# Patient Record
Sex: MALE | Race: WHITE | Employment: OTHER | URBAN - METROPOLITAN AREA
[De-identification: names, ages, dates, MRNs, and addresses within clinical notes are randomized per-mention and may not be internally consistent; named-entity substitution may affect disease eponyms.]

---

## 2017-02-10 ENCOUNTER — OFFICE VISIT (OUTPATIENT)
Dept: ORTHOPEDIC SURGERY | Age: 62
End: 2017-02-10

## 2017-02-10 VITALS
WEIGHT: 185 LBS | HEIGHT: 67 IN | HEART RATE: 95 BPM | DIASTOLIC BLOOD PRESSURE: 75 MMHG | BODY MASS INDEX: 29.03 KG/M2 | SYSTOLIC BLOOD PRESSURE: 136 MMHG

## 2017-02-10 DIAGNOSIS — M25.462 EFFUSION OF KNEE JOINT, LEFT: ICD-10-CM

## 2017-02-10 DIAGNOSIS — M21.162 ACQUIRED VARUS DEFORMITY KNEE, LEFT: Primary | ICD-10-CM

## 2017-02-10 DIAGNOSIS — M17.12 PRIMARY OSTEOARTHRITIS OF LEFT KNEE: ICD-10-CM

## 2017-02-10 PROCEDURE — 99213 OFFICE O/P EST LOW 20 MIN: CPT | Performed by: ORTHOPAEDIC SURGERY

## 2017-02-10 RX ORDER — DICLOFENAC SODIUM 75 MG/1
75 TABLET, DELAYED RELEASE ORAL 2 TIMES DAILY
Qty: 60 TABLET | Refills: 3 | Status: SHIPPED | OUTPATIENT
Start: 2017-02-10 | End: 2017-03-03 | Stop reason: HOSPADM

## 2017-02-16 ENCOUNTER — TELEPHONE (OUTPATIENT)
Dept: ORTHOPEDIC SURGERY | Age: 62
End: 2017-02-16

## 2017-02-16 ENCOUNTER — HOSPITAL ENCOUNTER (OUTPATIENT)
Dept: PHYSICAL THERAPY | Age: 62
Discharge: HOME | End: 2017-02-28
Admitting: ORTHOPAEDIC SURGERY

## 2017-02-20 ENCOUNTER — HOSPITAL ENCOUNTER (OUTPATIENT)
Dept: PHYSICAL THERAPY | Age: 62
Discharge: HOME | End: 2017-02-20
Admitting: ORTHOPAEDIC SURGERY

## 2017-02-27 ENCOUNTER — OFFICE VISIT (OUTPATIENT)
Dept: ORTHOPEDIC SURGERY | Age: 62
End: 2017-02-27

## 2017-02-27 VITALS
BODY MASS INDEX: 29.03 KG/M2 | SYSTOLIC BLOOD PRESSURE: 131 MMHG | DIASTOLIC BLOOD PRESSURE: 76 MMHG | WEIGHT: 185 LBS | HEIGHT: 67 IN | HEART RATE: 82 BPM

## 2017-02-27 DIAGNOSIS — M17.12 PRIMARY OSTEOARTHRITIS OF LEFT KNEE: ICD-10-CM

## 2017-02-27 DIAGNOSIS — M22.2X2 PATELLOFEMORAL STRESS SYNDROME OF LEFT KNEE: ICD-10-CM

## 2017-02-27 DIAGNOSIS — M21.162 ACQUIRED VARUS DEFORMITY KNEE, LEFT: ICD-10-CM

## 2017-02-27 DIAGNOSIS — M25.462 EFFUSION OF KNEE JOINT, LEFT: ICD-10-CM

## 2017-02-27 PROCEDURE — 99214 OFFICE O/P EST MOD 30 MIN: CPT | Performed by: ORTHOPAEDIC SURGERY

## 2017-03-01 ENCOUNTER — HOSPITAL ENCOUNTER (OUTPATIENT)
Dept: PREADMISSION TESTING | Age: 62
Discharge: HOME | End: 2017-03-01
Attending: ORTHOPAEDIC SURGERY | Admitting: ORTHOPAEDIC SURGERY

## 2017-03-01 VITALS — BODY MASS INDEX: 29.03 KG/M2 | WEIGHT: 185 LBS | HEIGHT: 67 IN

## 2017-03-03 ENCOUNTER — HOSPITAL ENCOUNTER (OUTPATIENT)
Dept: SURGERY | Age: 62
Discharge: HOME | End: 2017-03-03
Admitting: ORTHOPAEDIC SURGERY

## 2017-03-03 ENCOUNTER — TELEPHONE (OUTPATIENT)
Dept: ORTHOPEDIC SURGERY | Age: 62
End: 2017-03-03

## 2017-03-03 VITALS
TEMPERATURE: 97 F | SYSTOLIC BLOOD PRESSURE: 119 MMHG | RESPIRATION RATE: 16 BRPM | DIASTOLIC BLOOD PRESSURE: 74 MMHG | HEIGHT: 67 IN | BODY MASS INDEX: 29.03 KG/M2 | HEART RATE: 82 BPM | OXYGEN SATURATION: 96 % | WEIGHT: 185 LBS

## 2017-03-03 DIAGNOSIS — M17.12 PRIMARY OSTEOARTHRITIS OF LEFT KNEE: ICD-10-CM

## 2017-03-03 DIAGNOSIS — M21.162 ACQUIRED VARUS DEFORMITY KNEE, LEFT: ICD-10-CM

## 2017-03-03 DIAGNOSIS — M25.462 EFFUSION OF KNEE JOINT, LEFT: ICD-10-CM

## 2017-03-03 LAB
GLUCOSE BLD-MCNC: 96 MG/DL (ref 70–99)
PERFORMED ON: NORMAL

## 2017-03-03 PROCEDURE — 93010 ELECTROCARDIOGRAM REPORT: CPT | Performed by: INTERNAL MEDICINE

## 2017-03-03 RX ORDER — LABETALOL HYDROCHLORIDE 5 MG/ML
5 INJECTION, SOLUTION INTRAVENOUS EVERY 10 MIN PRN
Status: DISCONTINUED | OUTPATIENT
Start: 2017-03-03 | End: 2017-03-04 | Stop reason: HOSPADM

## 2017-03-03 RX ORDER — DIPHENHYDRAMINE HYDROCHLORIDE 50 MG/ML
12.5 INJECTION INTRAMUSCULAR; INTRAVENOUS
Status: ACTIVE | OUTPATIENT
Start: 2017-03-03 | End: 2017-03-03

## 2017-03-03 RX ORDER — MIDAZOLAM HYDROCHLORIDE 1 MG/ML
2 INJECTION INTRAMUSCULAR; INTRAVENOUS
Status: COMPLETED | OUTPATIENT
Start: 2017-03-03 | End: 2017-03-03

## 2017-03-03 RX ORDER — MEPERIDINE HYDROCHLORIDE 25 MG/ML
12.5 INJECTION INTRAMUSCULAR; INTRAVENOUS; SUBCUTANEOUS EVERY 5 MIN PRN
Status: DISCONTINUED | OUTPATIENT
Start: 2017-03-03 | End: 2017-03-04 | Stop reason: HOSPADM

## 2017-03-03 RX ORDER — SODIUM CHLORIDE, SODIUM LACTATE, POTASSIUM CHLORIDE, CALCIUM CHLORIDE 600; 310; 30; 20 MG/100ML; MG/100ML; MG/100ML; MG/100ML
INJECTION, SOLUTION INTRAVENOUS CONTINUOUS
Status: DISCONTINUED | OUTPATIENT
Start: 2017-03-03 | End: 2017-03-04 | Stop reason: HOSPADM

## 2017-03-03 RX ORDER — LORAZEPAM 2 MG/ML
0.5 INJECTION INTRAMUSCULAR EVERY 6 HOURS PRN
Status: DISCONTINUED | OUTPATIENT
Start: 2017-03-03 | End: 2017-03-04 | Stop reason: HOSPADM

## 2017-03-03 RX ORDER — HYDROCODONE BITARTRATE AND ACETAMINOPHEN 7.5; 325 MG/1; MG/1
2 TABLET ORAL EVERY 4 HOURS PRN
Status: DISCONTINUED | OUTPATIENT
Start: 2017-03-03 | End: 2017-03-04 | Stop reason: HOSPADM

## 2017-03-03 RX ORDER — HYDROCODONE BITARTRATE AND ACETAMINOPHEN 7.5; 325 MG/1; MG/1
1 TABLET ORAL EVERY 4 HOURS PRN
Status: DISCONTINUED | OUTPATIENT
Start: 2017-03-03 | End: 2017-03-04 | Stop reason: HOSPADM

## 2017-03-03 RX ORDER — HYDROCODONE BITARTRATE AND ACETAMINOPHEN 7.5; 325 MG/1; MG/1
1 TABLET ORAL EVERY 4 HOURS PRN
Status: DISCONTINUED | OUTPATIENT
Start: 2017-03-03 | End: 2017-03-03 | Stop reason: CLARIF

## 2017-03-03 RX ORDER — HYDRALAZINE HYDROCHLORIDE 20 MG/ML
5 INJECTION INTRAMUSCULAR; INTRAVENOUS EVERY 10 MIN PRN
Status: DISCONTINUED | OUTPATIENT
Start: 2017-03-03 | End: 2017-03-04 | Stop reason: HOSPADM

## 2017-03-03 RX ORDER — PROMETHAZINE HYDROCHLORIDE 25 MG/ML
6.25 INJECTION, SOLUTION INTRAMUSCULAR; INTRAVENOUS
Status: ACTIVE | OUTPATIENT
Start: 2017-03-03 | End: 2017-03-03

## 2017-03-03 RX ORDER — HYDROCODONE BITARTRATE AND ACETAMINOPHEN 7.5; 325 MG/1; MG/1
1 TABLET ORAL EVERY 4 HOURS PRN
Qty: 60 TABLET | Refills: 0 | Status: SHIPPED | OUTPATIENT
Start: 2017-03-03 | End: 2017-03-27

## 2017-03-03 RX ORDER — LORAZEPAM 0.5 MG/1
0.5 TABLET ORAL EVERY 6 HOURS PRN
Status: DISCONTINUED | OUTPATIENT
Start: 2017-03-03 | End: 2017-03-04 | Stop reason: HOSPADM

## 2017-03-03 RX ORDER — ONDANSETRON 2 MG/ML
4 INJECTION INTRAMUSCULAR; INTRAVENOUS
Status: ACTIVE | OUTPATIENT
Start: 2017-03-03 | End: 2017-03-03

## 2017-03-03 RX ORDER — FENTANYL CITRATE 50 UG/ML
25 INJECTION, SOLUTION INTRAMUSCULAR; INTRAVENOUS EVERY 5 MIN PRN
Status: DISCONTINUED | OUTPATIENT
Start: 2017-03-03 | End: 2017-03-04 | Stop reason: HOSPADM

## 2017-03-03 RX ADMIN — SODIUM CHLORIDE, SODIUM LACTATE, POTASSIUM CHLORIDE, CALCIUM CHLORIDE: 600; 310; 30; 20 INJECTION, SOLUTION INTRAVENOUS at 10:52

## 2017-03-03 RX ADMIN — MIDAZOLAM HYDROCHLORIDE 2 MG: 1 INJECTION INTRAMUSCULAR; INTRAVENOUS at 11:04

## 2017-03-03 RX ADMIN — FENTANYL CITRATE 25 MCG: 50 INJECTION, SOLUTION INTRAMUSCULAR; INTRAVENOUS at 14:10

## 2017-03-03 RX ADMIN — LORAZEPAM 0.5 MG: 2 INJECTION INTRAMUSCULAR at 13:57

## 2017-03-03 ASSESSMENT — PAIN SCALES - GENERAL
PAINLEVEL_OUTOF10: 4
PAINLEVEL_OUTOF10: 4
PAINLEVEL_OUTOF10: 2
PAINLEVEL_OUTOF10: 4

## 2017-03-03 ASSESSMENT — PAIN - FUNCTIONAL ASSESSMENT: PAIN_FUNCTIONAL_ASSESSMENT: 0-10

## 2017-03-03 ASSESSMENT — PAIN DESCRIPTION - PROGRESSION
CLINICAL_PROGRESSION: GRADUALLY WORSENING
CLINICAL_PROGRESSION_2: GRADUALLY IMPROVING

## 2017-03-03 ASSESSMENT — PAIN DESCRIPTION - DESCRIPTORS: DESCRIPTORS_2: PRESSURE

## 2017-03-03 ASSESSMENT — PAIN DESCRIPTION - INTENSITY: RATING_2: 4

## 2017-03-03 ASSESSMENT — PAIN DESCRIPTION - PAIN TYPE
TYPE: SURGICAL PAIN
TYPE_2: ACUTE PAIN

## 2017-03-03 ASSESSMENT — PAIN DESCRIPTION - LOCATION
LOCATION: KNEE
LOCATION_2: CHEST

## 2017-03-03 ASSESSMENT — PAIN DESCRIPTION - ORIENTATION: ORIENTATION: LEFT

## 2017-03-06 ENCOUNTER — OFFICE VISIT (OUTPATIENT)
Dept: ORTHOPEDIC SURGERY | Age: 62
End: 2017-03-06

## 2017-03-06 VITALS
HEART RATE: 97 BPM | WEIGHT: 185 LBS | HEIGHT: 67 IN | BODY MASS INDEX: 29.03 KG/M2 | SYSTOLIC BLOOD PRESSURE: 127 MMHG | DIASTOLIC BLOOD PRESSURE: 77 MMHG

## 2017-03-06 DIAGNOSIS — M17.12 PRIMARY OSTEOARTHRITIS OF LEFT KNEE: Primary | ICD-10-CM

## 2017-03-06 LAB
EKG ATRIAL RATE: 67 BPM
EKG DIAGNOSIS: NORMAL
EKG P AXIS: 44 DEGREES
EKG P-R INTERVAL: 150 MS
EKG Q-T INTERVAL: 388 MS
EKG QRS DURATION: 84 MS
EKG QTC CALCULATION (BAZETT): 409 MS
EKG R AXIS: 5 DEGREES
EKG T AXIS: 26 DEGREES
EKG VENTRICULAR RATE: 67 BPM

## 2017-03-06 PROCEDURE — 99024 POSTOP FOLLOW-UP VISIT: CPT | Performed by: ORTHOPAEDIC SURGERY

## 2017-03-08 ENCOUNTER — HOSPITAL ENCOUNTER (OUTPATIENT)
Dept: PHYSICAL THERAPY | Age: 62
Discharge: HOME | End: 2017-03-08
Admitting: ORTHOPAEDIC SURGERY

## 2017-03-10 ENCOUNTER — HOSPITAL ENCOUNTER (OUTPATIENT)
Dept: PHYSICAL THERAPY | Age: 62
Discharge: HOME | End: 2017-03-10
Admitting: ORTHOPAEDIC SURGERY

## 2017-03-14 ENCOUNTER — HOSPITAL ENCOUNTER (OUTPATIENT)
Dept: PHYSICAL THERAPY | Age: 62
Discharge: HOME | End: 2017-03-14
Admitting: ORTHOPAEDIC SURGERY

## 2017-03-17 ENCOUNTER — HOSPITAL ENCOUNTER (OUTPATIENT)
Dept: PHYSICAL THERAPY | Age: 62
Discharge: HOME | End: 2017-03-17
Admitting: ORTHOPAEDIC SURGERY

## 2017-03-27 ENCOUNTER — OFFICE VISIT (OUTPATIENT)
Dept: ORTHOPEDIC SURGERY | Age: 62
End: 2017-03-27

## 2017-03-27 VITALS
DIASTOLIC BLOOD PRESSURE: 75 MMHG | HEART RATE: 59 BPM | WEIGHT: 185 LBS | SYSTOLIC BLOOD PRESSURE: 134 MMHG | HEIGHT: 67 IN | BODY MASS INDEX: 29.03 KG/M2

## 2017-03-27 DIAGNOSIS — M25.462 EFFUSION OF KNEE JOINT, LEFT: ICD-10-CM

## 2017-03-27 DIAGNOSIS — M22.2X2 PATELLOFEMORAL STRESS SYNDROME OF LEFT KNEE: ICD-10-CM

## 2017-03-27 DIAGNOSIS — M17.12 PRIMARY OSTEOARTHRITIS OF LEFT KNEE: Primary | ICD-10-CM

## 2017-03-27 PROCEDURE — 73562 X-RAY EXAM OF KNEE 3: CPT | Performed by: ORTHOPAEDIC SURGERY

## 2017-03-27 PROCEDURE — 99024 POSTOP FOLLOW-UP VISIT: CPT | Performed by: ORTHOPAEDIC SURGERY

## 2017-03-27 RX ORDER — IBUPROFEN 800 MG/1
800 TABLET ORAL 3 TIMES DAILY PRN
Qty: 90 TABLET | Refills: 3 | Status: SHIPPED | OUTPATIENT
Start: 2017-03-27 | End: 2017-04-24

## 2017-04-24 ENCOUNTER — OFFICE VISIT (OUTPATIENT)
Dept: ORTHOPEDIC SURGERY | Age: 62
End: 2017-04-24

## 2017-04-24 VITALS
DIASTOLIC BLOOD PRESSURE: 78 MMHG | WEIGHT: 185 LBS | HEIGHT: 67 IN | SYSTOLIC BLOOD PRESSURE: 121 MMHG | BODY MASS INDEX: 29.03 KG/M2 | HEART RATE: 81 BPM

## 2017-04-24 DIAGNOSIS — M25.462 EFFUSION OF KNEE JOINT, LEFT: ICD-10-CM

## 2017-04-24 DIAGNOSIS — M17.12 PRIMARY OSTEOARTHRITIS OF LEFT KNEE: ICD-10-CM

## 2017-04-24 DIAGNOSIS — M22.2X2 PATELLOFEMORAL STRESS SYNDROME OF LEFT KNEE: Primary | ICD-10-CM

## 2017-04-24 PROCEDURE — 99024 POSTOP FOLLOW-UP VISIT: CPT | Performed by: ORTHOPAEDIC SURGERY

## 2017-04-24 RX ORDER — DICLOFENAC SODIUM 75 MG/1
75 TABLET, DELAYED RELEASE ORAL 2 TIMES DAILY
Qty: 60 TABLET | Refills: 3 | Status: SHIPPED | OUTPATIENT
Start: 2017-04-24 | End: 2018-01-26 | Stop reason: ALTCHOICE

## 2017-05-22 ENCOUNTER — OFFICE VISIT (OUTPATIENT)
Dept: ORTHOPEDIC SURGERY | Age: 62
End: 2017-05-22

## 2017-05-22 VITALS
BODY MASS INDEX: 29.03 KG/M2 | SYSTOLIC BLOOD PRESSURE: 126 MMHG | HEART RATE: 89 BPM | HEIGHT: 67 IN | DIASTOLIC BLOOD PRESSURE: 85 MMHG | WEIGHT: 185 LBS

## 2017-05-22 DIAGNOSIS — M21.162 ACQUIRED VARUS DEFORMITY KNEE, LEFT: ICD-10-CM

## 2017-05-22 DIAGNOSIS — M17.12 PRIMARY OSTEOARTHRITIS OF LEFT KNEE: Primary | ICD-10-CM

## 2017-05-22 DIAGNOSIS — M25.462 EFFUSION OF KNEE JOINT, LEFT: ICD-10-CM

## 2017-05-22 PROCEDURE — 99024 POSTOP FOLLOW-UP VISIT: CPT | Performed by: ORTHOPAEDIC SURGERY

## 2017-05-22 PROCEDURE — 73564 X-RAY EXAM KNEE 4 OR MORE: CPT | Performed by: ORTHOPAEDIC SURGERY

## 2017-05-26 ENCOUNTER — TELEPHONE (OUTPATIENT)
Dept: ORTHOPEDIC SURGERY | Age: 62
End: 2017-05-26

## 2017-05-31 RX ORDER — GABAPENTIN 300 MG/1
300 CAPSULE ORAL
Status: CANCELLED | OUTPATIENT
Start: 2017-05-31 | End: 2017-05-31

## 2017-05-31 RX ORDER — TRANEXAMIC ACID 650 1/1
1950 TABLET ORAL ONCE
Status: CANCELLED | OUTPATIENT
Start: 2017-05-31 | End: 2017-05-31

## 2017-06-01 ENCOUNTER — HOSPITAL ENCOUNTER (OUTPATIENT)
Dept: PREADMISSION TESTING | Age: 62
Discharge: HOME | End: 2017-06-01
Attending: ORTHOPAEDIC SURGERY | Admitting: ORTHOPAEDIC SURGERY

## 2017-06-01 VITALS
WEIGHT: 186 LBS | TEMPERATURE: 97.5 F | RESPIRATION RATE: 16 BRPM | DIASTOLIC BLOOD PRESSURE: 61 MMHG | HEART RATE: 59 BPM | BODY MASS INDEX: 29.19 KG/M2 | OXYGEN SATURATION: 96 % | SYSTOLIC BLOOD PRESSURE: 108 MMHG | HEIGHT: 67 IN

## 2017-06-01 LAB
ABO/RH: NORMAL
ANION GAP SERPL CALCULATED.3IONS-SCNC: 11 MMOL/L (ref 3–16)
ANTIBODY SCREEN: NORMAL
APTT: 29.4 SEC (ref 21–31.8)
BASOPHILS ABSOLUTE: 0.1 K/UL (ref 0–0.2)
BASOPHILS RELATIVE PERCENT: 0.8 %
BILIRUBIN URINE: NEGATIVE
BLOOD, URINE: ABNORMAL
BUN BLDV-MCNC: 14 MG/DL (ref 7–20)
CALCIUM SERPL-MCNC: 9 MG/DL (ref 8.3–10.6)
CHLORIDE BLD-SCNC: 105 MMOL/L (ref 99–110)
CLARITY: CLEAR
CO2: 27 MMOL/L (ref 21–32)
COLOR: YELLOW
CREAT SERPL-MCNC: 0.8 MG/DL (ref 0.8–1.3)
EOSINOPHILS ABSOLUTE: 0.2 K/UL (ref 0–0.6)
EOSINOPHILS RELATIVE PERCENT: 3.1 %
GFR AFRICAN AMERICAN: >60
GFR NON-AFRICAN AMERICAN: >60
GLUCOSE BLD-MCNC: 104 MG/DL (ref 70–99)
GLUCOSE URINE: NEGATIVE MG/DL
HCT VFR BLD CALC: 45.8 % (ref 40.5–52.5)
HEMOGLOBIN: 15.5 G/DL (ref 13.5–17.5)
INR BLD: 0.98 (ref 0.85–1.15)
KETONES, URINE: NEGATIVE MG/DL
LEUKOCYTE ESTERASE, URINE: NEGATIVE
LYMPHOCYTES ABSOLUTE: 2.9 K/UL (ref 1–5.1)
LYMPHOCYTES RELATIVE PERCENT: 37.5 %
MCH RBC QN AUTO: 32.3 PG (ref 26–34)
MCHC RBC AUTO-ENTMCNC: 33.8 G/DL (ref 31–36)
MCV RBC AUTO: 95.4 FL (ref 80–100)
MICROSCOPIC EXAMINATION: YES
MONOCYTES ABSOLUTE: 0.7 K/UL (ref 0–1.3)
MONOCYTES RELATIVE PERCENT: 9 %
NEUTROPHILS ABSOLUTE: 3.9 K/UL (ref 1.7–7.7)
NEUTROPHILS RELATIVE PERCENT: 49.6 %
NITRITE, URINE: NEGATIVE
PDW BLD-RTO: 13.1 % (ref 12.4–15.4)
PH UA: 6
PLATELET # BLD: 221 K/UL (ref 135–450)
PMV BLD AUTO: 8.9 FL (ref 5–10.5)
POTASSIUM SERPL-SCNC: 4.1 MMOL/L (ref 3.5–5.1)
PROTEIN UA: NEGATIVE MG/DL
PROTHROMBIN TIME: 11.1 SEC (ref 9.6–13)
RBC # BLD: 4.8 M/UL (ref 4.2–5.9)
RBC UA: NORMAL /HPF (ref 0–2)
SODIUM BLD-SCNC: 143 MMOL/L (ref 136–145)
SPECIFIC GRAVITY UA: 1.02
URINE TYPE: ABNORMAL
UROBILINOGEN, URINE: 0.2 E.U./DL
WBC # BLD: 7.8 K/UL (ref 4–11)
WBC UA: NORMAL /HPF (ref 0–5)

## 2017-06-02 LAB
MRSA SCREEN RT-PCR: NORMAL
URINE CULTURE, ROUTINE: NORMAL

## 2017-06-12 ENCOUNTER — OFFICE VISIT (OUTPATIENT)
Dept: ORTHOPEDIC SURGERY | Age: 62
End: 2017-06-12

## 2017-06-12 VITALS
HEIGHT: 67 IN | DIASTOLIC BLOOD PRESSURE: 70 MMHG | BODY MASS INDEX: 29.19 KG/M2 | WEIGHT: 186 LBS | SYSTOLIC BLOOD PRESSURE: 120 MMHG | HEART RATE: 103 BPM

## 2017-06-12 DIAGNOSIS — Z96.652 STATUS POST TOTAL LEFT KNEE REPLACEMENT: Primary | ICD-10-CM

## 2017-06-12 PROCEDURE — 99024 POSTOP FOLLOW-UP VISIT: CPT | Performed by: ORTHOPAEDIC SURGERY

## 2017-06-12 RX ORDER — OXYCODONE HYDROCHLORIDE AND ACETAMINOPHEN 5; 325 MG/1; MG/1
1-2 TABLET ORAL 4 TIMES DAILY PRN
Qty: 60 TABLET | Refills: 0 | Status: SHIPPED | OUTPATIENT
Start: 2017-06-12 | End: 2017-07-10

## 2017-06-16 ENCOUNTER — TELEPHONE (OUTPATIENT)
Dept: ORTHOPEDIC SURGERY | Age: 62
End: 2017-06-16

## 2017-06-16 RX ORDER — HYDROCODONE BITARTRATE AND ACETAMINOPHEN 7.5; 325 MG/1; MG/1
1 TABLET ORAL 4 TIMES DAILY PRN
Qty: 60 TABLET | Refills: 0 | Status: SHIPPED | OUTPATIENT
Start: 2017-06-16 | End: 2017-07-10

## 2017-06-19 ENCOUNTER — TELEPHONE (OUTPATIENT)
Dept: ORTHOPEDIC SURGERY | Age: 62
End: 2017-06-19

## 2017-07-03 ENCOUNTER — TELEPHONE (OUTPATIENT)
Dept: ORTHOPEDIC SURGERY | Age: 62
End: 2017-07-03

## 2017-07-03 RX ORDER — HYDROCODONE BITARTRATE AND ACETAMINOPHEN 7.5; 325 MG/1; MG/1
1 TABLET ORAL 2 TIMES DAILY PRN
Qty: 60 TABLET | Refills: 0 | Status: SHIPPED | OUTPATIENT
Start: 2017-07-03 | End: 2017-07-10

## 2017-07-10 ENCOUNTER — OFFICE VISIT (OUTPATIENT)
Dept: ORTHOPEDIC SURGERY | Age: 62
End: 2017-07-10

## 2017-07-10 VITALS
WEIGHT: 186 LBS | SYSTOLIC BLOOD PRESSURE: 121 MMHG | DIASTOLIC BLOOD PRESSURE: 77 MMHG | HEIGHT: 67 IN | BODY MASS INDEX: 29.19 KG/M2 | HEART RATE: 81 BPM

## 2017-07-10 DIAGNOSIS — Z96.652 STATUS POST TOTAL LEFT KNEE REPLACEMENT: Primary | ICD-10-CM

## 2017-07-10 PROCEDURE — 99024 POSTOP FOLLOW-UP VISIT: CPT | Performed by: ORTHOPAEDIC SURGERY

## 2017-07-10 RX ORDER — HYDROCODONE BITARTRATE AND ACETAMINOPHEN 7.5; 325 MG/1; MG/1
1 TABLET ORAL 3 TIMES DAILY PRN
Qty: 60 TABLET | Refills: 0 | Status: SHIPPED | OUTPATIENT
Start: 2017-07-10 | End: 2017-10-25

## 2017-07-10 RX ORDER — DICLOFENAC SODIUM 75 MG/1
75 TABLET, DELAYED RELEASE ORAL 2 TIMES DAILY
Qty: 60 TABLET | Refills: 3 | Status: SHIPPED | OUTPATIENT
Start: 2017-07-10 | End: 2017-10-25

## 2017-07-17 ENCOUNTER — TELEPHONE (OUTPATIENT)
Dept: ORTHOPEDIC SURGERY | Age: 62
End: 2017-07-17

## 2017-07-17 RX ORDER — AMOXICILLIN AND CLAVULANATE POTASSIUM 875; 125 MG/1; MG/1
1 TABLET, FILM COATED ORAL 2 TIMES DAILY
Qty: 20 TABLET | Refills: 0 | Status: SHIPPED | OUTPATIENT
Start: 2017-07-17 | End: 2017-07-27

## 2017-07-18 ENCOUNTER — HOSPITAL ENCOUNTER (OUTPATIENT)
Dept: PHYSICAL THERAPY | Age: 62
Discharge: HOME | End: 2017-07-31
Admitting: ORTHOPAEDIC SURGERY

## 2017-07-24 ENCOUNTER — HOSPITAL ENCOUNTER (OUTPATIENT)
Dept: PHYSICAL THERAPY | Age: 62
Discharge: HOME | End: 2017-07-24
Admitting: ORTHOPAEDIC SURGERY

## 2017-07-26 ENCOUNTER — TELEPHONE (OUTPATIENT)
Dept: ORTHOPEDIC SURGERY | Age: 62
End: 2017-07-26

## 2017-07-26 ENCOUNTER — HOSPITAL ENCOUNTER (OUTPATIENT)
Dept: PHYSICAL THERAPY | Age: 62
Discharge: HOME | End: 2017-07-26
Admitting: ORTHOPAEDIC SURGERY

## 2017-07-26 RX ORDER — METHOCARBAMOL 750 MG/1
750 TABLET, FILM COATED ORAL 3 TIMES DAILY PRN
Qty: 90 TABLET | Refills: 0 | Status: SHIPPED | OUTPATIENT
Start: 2017-07-26 | End: 2017-08-25

## 2017-08-09 ENCOUNTER — HOSPITAL ENCOUNTER (OUTPATIENT)
Dept: PHYSICAL THERAPY | Age: 62
Discharge: HOME | End: 2017-08-09
Admitting: ORTHOPAEDIC SURGERY

## 2017-08-11 ENCOUNTER — OFFICE VISIT (OUTPATIENT)
Dept: ORTHOPEDIC SURGERY | Age: 62
End: 2017-08-11

## 2017-08-11 ENCOUNTER — HOSPITAL ENCOUNTER (OUTPATIENT)
Dept: PHYSICAL THERAPY | Age: 62
Discharge: HOME | End: 2017-08-11
Admitting: ORTHOPAEDIC SURGERY

## 2017-08-11 VITALS
DIASTOLIC BLOOD PRESSURE: 85 MMHG | BODY MASS INDEX: 29.19 KG/M2 | HEIGHT: 67 IN | WEIGHT: 186 LBS | HEART RATE: 66 BPM | SYSTOLIC BLOOD PRESSURE: 142 MMHG

## 2017-08-11 DIAGNOSIS — M17.12 PRIMARY OSTEOARTHRITIS OF LEFT KNEE: Chronic | ICD-10-CM

## 2017-08-11 DIAGNOSIS — M25.462 EFFUSION OF KNEE JOINT, LEFT: Primary | ICD-10-CM

## 2017-08-11 DIAGNOSIS — M21.162 ACQUIRED VARUS DEFORMITY KNEE, LEFT: ICD-10-CM

## 2017-08-11 DIAGNOSIS — Z96.652 STATUS POST TOTAL LEFT KNEE REPLACEMENT: ICD-10-CM

## 2017-08-11 DIAGNOSIS — G90.523 REFLEX SYMPATHETIC DYSTROPHY OF BOTH LOWER EXTREMITIES: ICD-10-CM

## 2017-08-11 PROCEDURE — 99024 POSTOP FOLLOW-UP VISIT: CPT | Performed by: ORTHOPAEDIC SURGERY

## 2017-08-11 RX ORDER — GABAPENTIN 300 MG/1
300 CAPSULE ORAL 3 TIMES DAILY
Qty: 90 CAPSULE | Refills: 3 | Status: SHIPPED | OUTPATIENT
Start: 2017-08-11 | End: 2018-03-19 | Stop reason: CLARIF

## 2017-08-14 ENCOUNTER — HOSPITAL ENCOUNTER (OUTPATIENT)
Dept: PHYSICAL THERAPY | Age: 62
Discharge: HOME | End: 2017-08-14
Admitting: ORTHOPAEDIC SURGERY

## 2017-08-17 ENCOUNTER — HOSPITAL ENCOUNTER (OUTPATIENT)
Dept: PHYSICAL THERAPY | Age: 62
Discharge: HOME | End: 2017-08-17
Admitting: ORTHOPAEDIC SURGERY

## 2017-08-22 ENCOUNTER — HOSPITAL ENCOUNTER (OUTPATIENT)
Dept: PHYSICAL THERAPY | Age: 62
Discharge: HOME | End: 2017-08-22
Admitting: ORTHOPAEDIC SURGERY

## 2017-08-24 ENCOUNTER — TELEPHONE (OUTPATIENT)
Dept: ORTHOPEDIC SURGERY | Age: 62
End: 2017-08-24

## 2017-08-24 ENCOUNTER — HOSPITAL ENCOUNTER (OUTPATIENT)
Dept: PHYSICAL THERAPY | Age: 62
Discharge: HOME | End: 2017-08-24
Admitting: ORTHOPAEDIC SURGERY

## 2017-08-24 DIAGNOSIS — Z96.652 STATUS POST TOTAL LEFT KNEE REPLACEMENT: Primary | ICD-10-CM

## 2017-08-25 ENCOUNTER — HOSPITAL ENCOUNTER (OUTPATIENT)
Dept: VASCULAR LAB | Age: 62
Discharge: HOME | End: 2017-08-25
Attending: ORTHOPAEDIC SURGERY | Admitting: ORTHOPAEDIC SURGERY

## 2017-08-25 DIAGNOSIS — R60.0 EDEMA OF RIGHT LOWER EXTREMITY: Primary | ICD-10-CM

## 2017-08-25 DIAGNOSIS — R60.0 EDEMA OF LEFT LOWER EXTREMITY: ICD-10-CM

## 2017-08-29 ENCOUNTER — HOSPITAL ENCOUNTER (OUTPATIENT)
Dept: PHYSICAL THERAPY | Age: 62
Discharge: HOME | End: 2017-08-29
Admitting: ORTHOPAEDIC SURGERY

## 2017-08-30 ENCOUNTER — OFFICE VISIT (OUTPATIENT)
Dept: ORTHOPEDIC SURGERY | Age: 62
End: 2017-08-30

## 2017-08-30 VITALS
BODY MASS INDEX: 29.2 KG/M2 | DIASTOLIC BLOOD PRESSURE: 73 MMHG | HEIGHT: 67 IN | SYSTOLIC BLOOD PRESSURE: 128 MMHG | WEIGHT: 186.07 LBS | HEART RATE: 101 BPM

## 2017-08-30 DIAGNOSIS — M25.562 ACUTE PAIN OF LEFT KNEE: ICD-10-CM

## 2017-08-30 DIAGNOSIS — Z96.652 STATUS POST TOTAL LEFT KNEE REPLACEMENT: Primary | ICD-10-CM

## 2017-08-30 DIAGNOSIS — M25.462 EFFUSION OF KNEE JOINT, LEFT: ICD-10-CM

## 2017-08-30 PROCEDURE — 99024 POSTOP FOLLOW-UP VISIT: CPT | Performed by: ORTHOPAEDIC SURGERY

## 2017-08-30 PROCEDURE — 20610 DRAIN/INJ JOINT/BURSA W/O US: CPT | Performed by: ORTHOPAEDIC SURGERY

## 2017-09-05 ENCOUNTER — HOSPITAL ENCOUNTER (OUTPATIENT)
Dept: PHYSICAL THERAPY | Age: 62
Discharge: HOME | End: 2017-09-05
Admitting: ORTHOPAEDIC SURGERY

## 2017-09-07 ENCOUNTER — HOSPITAL ENCOUNTER (OUTPATIENT)
Dept: PHYSICAL THERAPY | Age: 62
Discharge: HOME | End: 2017-09-07
Admitting: ORTHOPAEDIC SURGERY

## 2017-09-12 ENCOUNTER — HOSPITAL ENCOUNTER (OUTPATIENT)
Dept: PHYSICAL THERAPY | Age: 62
Discharge: HOME | End: 2017-09-12
Admitting: ORTHOPAEDIC SURGERY

## 2017-09-12 ENCOUNTER — TELEPHONE (OUTPATIENT)
Dept: ORTHOPEDIC SURGERY | Age: 62
End: 2017-09-12

## 2017-09-12 RX ORDER — METHOCARBAMOL 750 MG/1
750 TABLET, FILM COATED ORAL 3 TIMES DAILY
Qty: 45 TABLET | Refills: 0 | Status: SHIPPED | OUTPATIENT
Start: 2017-09-12 | End: 2017-09-27

## 2017-09-19 ENCOUNTER — HOSPITAL ENCOUNTER (OUTPATIENT)
Dept: PHYSICAL THERAPY | Age: 62
Discharge: HOME | End: 2017-09-19
Admitting: ORTHOPAEDIC SURGERY

## 2017-09-26 ENCOUNTER — HOSPITAL ENCOUNTER (OUTPATIENT)
Dept: PHYSICAL THERAPY | Age: 62
Discharge: HOME | End: 2017-09-26
Admitting: ORTHOPAEDIC SURGERY

## 2017-09-28 ENCOUNTER — HOSPITAL ENCOUNTER (OUTPATIENT)
Dept: PHYSICAL THERAPY | Age: 62
Discharge: HOME | End: 2017-09-28
Admitting: ORTHOPAEDIC SURGERY

## 2017-10-02 ENCOUNTER — OFFICE VISIT (OUTPATIENT)
Dept: ORTHOPEDIC SURGERY | Age: 62
End: 2017-10-02

## 2017-10-02 VITALS
DIASTOLIC BLOOD PRESSURE: 78 MMHG | SYSTOLIC BLOOD PRESSURE: 125 MMHG | HEART RATE: 72 BPM | BODY MASS INDEX: 29.89 KG/M2 | WEIGHT: 186 LBS | HEIGHT: 66 IN

## 2017-10-02 DIAGNOSIS — M54.42 LOW BACK PAIN WITH RADIATION, LEFT: Primary | ICD-10-CM

## 2017-10-02 DIAGNOSIS — M16.12 ARTHRITIS OF LEFT HIP: ICD-10-CM

## 2017-10-02 DIAGNOSIS — Z96.652 STATUS POST TOTAL LEFT KNEE REPLACEMENT: ICD-10-CM

## 2017-10-02 PROCEDURE — 72100 X-RAY EXAM L-S SPINE 2/3 VWS: CPT | Performed by: ORTHOPAEDIC SURGERY

## 2017-10-02 PROCEDURE — 99214 OFFICE O/P EST MOD 30 MIN: CPT | Performed by: ORTHOPAEDIC SURGERY

## 2017-10-02 PROCEDURE — 73521 X-RAY EXAM HIPS BI 2 VIEWS: CPT | Performed by: ORTHOPAEDIC SURGERY

## 2017-10-02 NOTE — PROGRESS NOTES
History of Present Illness:    Zora Hdz is a 57 y.o. male  . He comes in today for follow-up evaluation concerning problems with his knee. He notes the pain is still a 5 out of 10. He's here because of discomfort in the knee. On close questioning, he says the biggest problem is really his calf and quad. He said that if the calf pain. Her quad pain were gone. The knee really would be an issue  Chief Complaint , a summary of previous treatments, injury, and current reason for the visit:    He's been doing aggressive physical therapy on this knee, but continues with pain at within 4 months from surgery. Today's office visit consisted of review of all systems findings consultation along with discussion of lumbosacral discomfort and low back problems    Examination:    On examination today, the left knee with good    There is no warmth. There is no instability. There is a mild 1+ effusion but range of motion is excellent. He has very mild pain and discomfort along the anteromedial joint line but no gross pathology. Back: Evaluation a load lower extremity shows good patella Achilles tendon reflexes. He has some discomfort and achy pain into the calf and upper mid quad. He complains of mild paresthetic-type discomfort along the inner aspect of his foot and some discomfort along the inner aspect of his calf. Hip:  Hip examination shows some mild discomfort. Internal and external rotation, good flexion, extension. No gross abnormality.             Office Procedures:  Orders Placed This Encounter   Procedures    XR LUMBAR SPINE (2-3 VIEWS)     Order Specific Question:   Reason for exam:     Answer:   low back pain with raditiaon    XR HIP BILATERAL W AP PELVIS (2 VIEWS)     [id number]     Order Specific Question:   Reason for exam:     Answer:   hip/radiating leg pain right    MRI Lumbar Spine W WO Contrast     Standing Status:   Future     Standing Expiration Date:   10/2/2018

## 2017-10-02 NOTE — MR AVS SNAPSHOT
percent of your current weight) by decreasing your calorie intake and becoming more physically active will help lower your risk of developing or worsening diseases associated with obesity. Learn more at: [email or web address]             Medications and Orders      Your Current Medications Are              gabapentin (NEURONTIN) 300 MG capsule Take 1 capsule by mouth 3 times daily    CAPSAICIN EX Apply topically    HYDROcodone-acetaminophen (NORCO) 7.5-325 MG per tablet Take 1 tablet by mouth 3 times daily as needed for Pain . diclofenac (VOLTAREN) 75 MG EC tablet Take 1 tablet by mouth 2 times daily    diclofenac (VOLTAREN) 75 MG EC tablet Take 1 tablet by mouth 2 times daily    Cholecalciferol (VITAMIN D3) 09356 UNITS CAPS     diclofenac sodium (VOLTAREN) 1 % GEL Apply 2 g topically 2 times daily as needed for Pain    aspirin 325 MG tablet Take 1 tablet by mouth 2 times daily      Allergies              Other Anaphylaxis    Percocet [Oxycodone-acetaminophen] Shortness Of Breath    Felt like a heart attack    Morphine Other (See Comments)    Hallucinations  Lights me up      We Ordered/Performed the following           XR HIP BILATERAL W AP PELVIS (2 VIEWS)     Comments:    87680    XR LUMBAR SPINE (2-3 VIEWS)          Result Summary for XR LUMBAR SPINE (2-3 VIEWS)      Result Information     Status          Final result (Exam End: 10/2/2017  9:13 AM)           10/2/2017  9:13 AM      Narrative & Impression           Radiology exam is complete. No Radiologist dictation. Please follow up with ordering provider. Result Summary for XR HIP BILATERAL W AP PELVIS (2 VIEWS)      Result Information     Status          Final result (Exam End: 10/2/2017  9:16 AM)           10/2/2017  9:16 AM      Narrative & Impression           Radiology exam is complete. No Radiologist dictation. Please follow up with ordering provider.                         Additional Information Basic Information     Date Of Birth Sex Race Ethnicity Preferred Language    1955 Male White Non-/Non  English      Problem List as of 10/2/2017                 Status post total left knee replacement    Reflex sympathetic dystrophy of both lower extremities    Primary osteoarthritis of left knee (Chronic)    Acquired varus deformity knee    Effusion of knee joint, left    Acute pain of left knee    Patellofemoral stress syndrome of left knee    Gastritis    Chest pain    Hyperlipidemia      Preventive Care        Date Due    Hepatitis C screening is recommended for all adults regardless of risk factors born between [facility] at least once (lifetime) who have never been tested. 1955    HIV screening is recommended for all people regardless of risk factors  aged 15-65 years at least once (lifetime) who have never been HIV tested. 7/5/1968    Tetanus Combination Vaccine (1 - Tdap) 7/5/1972    Pneumococcal Vaccine - Pneumovax for adults aged 19-64 years with: chronic heart disease, chronic lung disease, diabetes mellitus, alcoholism, chronic liver disease, or cigarette smoking. (1 of 1 - PPSV23) 7/5/1972    Diabetes Screening 7/5/1993    Colonoscopy 7/5/2003    Zoster Vaccine 7/5/2013    Yearly Flu Vaccine (1) 9/1/2017    Cholesterol Screening 10/17/2017            MyChart Signup           Our records indicate that you have declined MyChart signup.

## 2017-10-03 ENCOUNTER — HOSPITAL ENCOUNTER (OUTPATIENT)
Dept: PHYSICAL THERAPY | Age: 62
Discharge: HOME | End: 2017-10-03
Admitting: ORTHOPAEDIC SURGERY

## 2017-10-03 DIAGNOSIS — M17.12 PRIMARY OSTEOARTHRITIS OF LEFT KNEE: Primary | Chronic | ICD-10-CM

## 2017-10-03 DIAGNOSIS — Z96.652 STATUS POST TOTAL LEFT KNEE REPLACEMENT: ICD-10-CM

## 2017-10-03 DIAGNOSIS — M25.562 ACUTE PAIN OF LEFT KNEE: ICD-10-CM

## 2017-10-03 NOTE — FLOWSHEET NOTE
really painful yesterday. OBJECTIVE:      Flexibility L R Comment   Hamstring SLR 55 deg  Lacking 40 deg in 90/90 position + tightness     Gastroc + tightness WNL     ITB Not assessed Not assessed     Quad + tightness WNL                  ROM PROM AROM Overpressure Comment     L R L R L R     Flexion  129   123 145         Extension -1 resting   0 +3            Strength L R Comment   Quad Fair + (quad set)  4-/5 Good (quat set)     Hamstring  4/5  4+/5     Gastroc  4+/5  4+/5     Hip  flexion  4/5  4+/5     Hip abd  4-/5  4+/5        Special Test Results/Comment   Homans Neg     Reflexes/Sensation:                         []Dermatomes/Myotomes intact                         []Reflexes equal and normal bilaterally                        []Other:     Joint mobility: WNL for TKA                         [x]Normal                                   []Hypo                        []Hyper     Palpation: Extreme sensitivity to light touch over the lateral aspect of the knee. + trigger points and TTP along medial aspect of calf muscle belly and distal HS tendons.      Functional Mobility/Transfers: Independent     Posture: No deviations     Bandages/Dressings/Incisions:   7/18/17 Incision healing well. Pt saw MD yesterday for an infected stitch at proximal 1/3 of incision.     Gait: Ambulating without AD.  Lacking TKE      RESTRICTIONS/PRECAUTIONS: TKA protocol    Exercises/Interventions:     Exercise/Equipment Resistance/Repetitions Other comments   Stretching     Hamstring 5x30\" Supine with strap   Hip Flexion     ITB     Grion     Quad     Inclined Calf 3x30\"    Towel Pull  Prop +10#   Tiger tail  Q/HS/GS        SLR     Supine 3x10 3#    Prone     Abduction 3x10 3#    Adduction     SLR+ 10x10\"         Isometrics     Quad sets          Patellar Glides     Medial     Superior     Inferior          ROM     Passive 3x30\" ERMI    Active     Weight Shift     Hang Weights     Sheet Pulls     Ankle Pumps          CKC     Calf verbal/tactile cueing for activities related to improving balance, coordination, kinesthetic sense, posture, motor skill, proprioception  to assist with LE, proximal hip, and core control in self care, mobility, lifting, ambulation and eccentric single leg control. NMR and Therapeutic Activities:    [] (99013 or 57039) Provided verbal/tactile cueing for activities related to improving balance, coordination, kinesthetic sense, posture, motor skill, proprioception and motor activation to allow for proper function of core, proximal hip and LE with self care and ADLs  [] (77793) Gait Re-education- Provided training and instruction to the patient for proper LE, core and proximal hip recruitment and positioning and eccentric body weight control with ambulation re-education including up and down stairs     Home Exercise Program:    [x] (91100) Reviewed/Progressed HEP activities related to strengthening, flexibility, endurance, ROM of core, proximal hip and LE for functional self-care, mobility, lifting and ambulation/stair navigation   [] (16113)Reviewed/Progressed HEP activities related to improving balance, coordination, kinesthetic sense, posture, motor skill, proprioception of core, proximal hip and LE for self care, mobility, lifting, and ambulation/stair navigation      Manual Treatments: 5'   IASTM calf and distal HS, sweeping and strumming motions    [x] (55784) Provided manual therapy to mobilize LE, proximal hip and/or LS spine soft tissue/joints for the purpose of modulating pain, promoting relaxation,  increasing ROM, reducing/eliminating soft tissue swelling/inflammation/restriction, improving soft tissue extensibility and allowing for proper ROM for normal function with self care, mobility, lifting and ambulation.      Modalities:  None    Charges:  Timed Code Treatment Minutes: 55   Total Treatment Minutes: 70     [] EVAL  [x] VX(93175) x  2   [] IONTO  [] NMR (01940) x      [] VASO  [] Manual (35248) x along the medial aspect of the knee, just posterior over the medial HS, pt states he has not had this pain before. Stated leg press was very tiring this session. Pt requires PT follow up to address ROM, strength and functional mobility deficits. Treatment/Activity Tolerance:  [x] Patient tolerated treatment well [] Patient limited by fatique  [] Patient limited by pain  [] Patient limited by other medical complications  [] Other:     Prognosis: [x] Good [] Fair  [] Poor    Patient Requires Follow-up: [x] Yes  [] No    PLAN: See eval  [x] Continue per plan of care [] Alter current plan (see comments)  [] Plan of care initiated [] Hold pending MD visit [] Discharge    Electronically signed by: Lupe Granado PT, DPT  Physical Therapist  [id number]  [email or web address]

## 2017-10-09 ENCOUNTER — TELEPHONE (OUTPATIENT)
Dept: ORTHOPEDIC SURGERY | Age: 62
End: 2017-10-09

## 2017-10-16 ENCOUNTER — HOSPITAL ENCOUNTER (OUTPATIENT)
Dept: PHYSICAL THERAPY | Age: 62
Discharge: HOME | End: 2017-10-16
Admitting: ORTHOPAEDIC SURGERY

## 2017-10-16 NOTE — FLOWSHEET NOTE
[facility]  Orthopaedics and Sports Rehabilitation, [facility]    Physical Therapy Daily Treatment Note  Date:  10/16/2017    Patient Name:  Derek Ceballos    :  1955  MRN: [id number]  Restrictions/Precautions:    Medical/Treatment Diagnosis Information:  · Diagnosis: O19.300 (ICD-10-CM) - Status post total left knee replacement DOS 17  · Treatment Diagnosis: L knee pain, HS/GS tightness, muscle weakness, impaired ADLs/IADLs  Insurance/Certification information:  PT Insurance Information: PT BENEFITS 2017 FACILITY/HUMANA/EFFECTIVE 16/ ACTIVE/ DED 0/ OOP 6000/ $55 COPAY/ PAYS 100%/ 60 VPCY/ ORTHONET AUTH REQ/ 2-15-17 PAG/ORTHONET  Physician Information:  Referring Practitioner: Trev Tai MD  Plan of care signed (Y/N):     Date of Patient follow up with Physician: 17    G-Code (if applicable):      Date G-Code Applied:  17  PT G-Codes  Functional Assessment Tool Used: LEFS  Score: =22.5% (77.5% deficit)  17=23.75% (76.25% deficit)  10/16/17 25/80=31.25% (68.75% deficit)  Functional Limitation: Mobility: Walking and moving around  Mobility: Walking and Moving Around Current Status (): At least 60 percent but less than 80 percent impaired, limited or restricted  Mobility: Walking and Moving Around Goal Status (): At least 1 percent but less than 20 percent impaired, limited or restricted    Progress Note: [x]  Yes  []  No  Next due by: Visit #10       Latex Allergy:  [x]NO      []YES  Preferred Language for Healthcare:   [x]English       []other:    Visit # Insurance Allowable   19  through 10/21/17     Pain level:  3-4/10     SUBJECTIVE:  Pt states that his knee has been bothering him more frequently, his pain has been sharp in nature and is across the knee cap. Pt states that his knee pain significantly limits his completion of ADLs and IADLs, he is able to negotiate stairs with reciprocal pattern but with pain.  He is able to walk for about 15-30 for proper ROM for normal function with self care, mobility, lifting and ambulation. Dry needling manual therapy: consisted on the placement of Spring Tens 60mm and Seirin 75mm needles in the following muscles:  L4 L and R Multifidi and Semitendinosus/Bicep Femoris. A 75mm at L L4 and 60mm needle at all other locations was inserted, piston, rotated, and coned to produce intramuscular mobilization. These techniques were used to restore functional range of motion, reduce muscle spasm and induce healing in the corresponding musculature. (82391)  Clean Technique was utilized today while applying Dry needling treatment. The treatment sites where cleaned with 70% solution of  isopropyl alcohol . The PT washed their hands and utilized treatment gloves along with hand  prior to inserting the needles. All needles where removed and discarded in the appropriate sharps container. PT reviewed precautions, contraindications, and indications with pt in addition to application of dry needling within established plan of care and expected outcomes; pt verbalized understanding with all questions answered. Pt had signed consent form for dry needling and PT obtained written consent with updated plan of care from MD before beginning. Modalities:  None    Charges:  Timed Code Treatment Minutes: 60   Total Treatment Minutes: 110     [] EVAL  [x] XZ(48174) x  1   [] IONTO  [] NMR (44500) x      [] VASO  [x] Manual (25471) x  2    [] Other:  [x] TA x  1    [] Mech Traction (16483)  [] ES(attended) (64389)      [] ES (un) (87514):     GOALS:   Patient stated goal: Regain strength and ROM. Improve stamina. Return to work without restriction.     Therapist goals for Patient:   Short Term Goals: To be achieved in: 2 weeks 8/1/17  1. Independent in HEP and progression per patient tolerance, in order to prevent re-injury.    2. Patient will have a decrease in pain to facilitate improvement in movement, function, and ADLs as indicated by Functional Deficits. 3. Patient will be able to achieve 0 deg ext with quad set.     Long Term Goals: To be achieved in: 6-8 weeks 8/29/17-9/12/17  1. Disability index score of 20% or less for the LEFS to assist with reaching prior level of function. 2. Patient will demonstrate an increase in LE strength to 4/5 or greater to allow for proper functional mobility as indicated by patients Functional Deficits. 3. Patient will return to ADLs, IADLs and functional activities without increased symptoms or restriction. 4. Patient will be able to tolerated walking/standing for >1 hour to allow for efficient return to work.                         Progression Towards Functional goals:  [] Patient is progressing as expected towards functional goals listed. [x] Progression is slowed due to complexities listed. [] Progression has been slowed due to co-morbidities. [] Plan just implemented, too soon to assess goals progression  [x] Other:  Limited by pain    ASSESSMENT:  Pt continues to present with extreme HS tightness, which improved with DN as can be seen in DN assessment below. Pt's MMT scores relatively unchanged, continues to have hip ABD weakness and imbalanced Q/HS ratio. Pain continues to be the pt's primary limiting factor with completion of ADLs/IADLs and progression of PT session. Pt reported that he tolerated session better today following DN. He was able to negotiate stairs with reciprocal pattern and good knee ROM, but noted pain, greatest with descent. Pt's pain complaints are sporadic and inconsistent, his pain is not brought on by any particular movement or position, but rather prolonged activity. Pt requires PT follow up to address ROM, strength and functional mobility deficits. Pt saw benefits from DN today and would benefit from continued DN treatments to gauge pt response to improvement in pain secondary to improvement in HS tightness and lumbar rotations.  At this time pt is unable to complete necessary work tasks given his current states of pain. Pt tolerated dry needling well with good response; only minimal c/o dizziness and light headedness afterward that was controlled with rest and water. Pt demonstrated a greater than 30 degree improvement in HS length at 90/90 in LLE from -95 to -64 post needling. Overall, pt presented with multiple latent trigger points t/o hamstring that were appreciated with LTR during needling. As well increased turgor was observed during skin rolling of paraspinals as well as increased needle grabbing at L4 multifidi L > R. Of note, pt stated feeling increased fatigue during treatment after needling session. PT encouraged pt to drink extra water and rest as needed this date and call PT with any questions or concerns. Pt verbalized understanding with all questions answered. Treatment/Activity Tolerance:  [] Patient tolerated treatment well [] Patient limited by fatique  [x] Patient limited by pain  [] Patient limited by other medical complications  [] Other:     Prognosis: [x] Good [] Fair  [] Poor    Patient Requires Follow-up: [x] Yes  [] No    PLAN: See eval  [x] Continue per plan of care [] Alter current plan (see comments)  [] Plan of care initiated [] Hold pending MD visit [] Discharge    Electronically signed by: Zahra Riley PT, DPT  Physical Therapist  [id number]  [email or web address]

## 2017-10-19 ENCOUNTER — OFFICE VISIT (OUTPATIENT)
Dept: ORTHOPEDIC SURGERY | Age: 62
End: 2017-10-19

## 2017-10-19 ENCOUNTER — TELEPHONE (OUTPATIENT)
Dept: ORTHOPEDIC SURGERY | Age: 62
End: 2017-10-19

## 2017-10-19 VITALS
SYSTOLIC BLOOD PRESSURE: 126 MMHG | HEIGHT: 66 IN | WEIGHT: 186 LBS | HEART RATE: 81 BPM | DIASTOLIC BLOOD PRESSURE: 82 MMHG | BODY MASS INDEX: 29.89 KG/M2

## 2017-10-19 DIAGNOSIS — M25.462 EFFUSION OF LEFT KNEE: ICD-10-CM

## 2017-10-19 DIAGNOSIS — Z96.652 STATUS POST TOTAL LEFT KNEE REPLACEMENT: ICD-10-CM

## 2017-10-19 DIAGNOSIS — M79.2 NEURITIS: ICD-10-CM

## 2017-10-19 DIAGNOSIS — Z96.652 STATUS POST TOTAL LEFT KNEE REPLACEMENT: Primary | ICD-10-CM

## 2017-10-19 DIAGNOSIS — M25.562 ACUTE PAIN OF LEFT KNEE: ICD-10-CM

## 2017-10-19 PROCEDURE — 99213 OFFICE O/P EST LOW 20 MIN: CPT | Performed by: PHYSICIAN ASSISTANT

## 2017-10-19 PROCEDURE — 20610 DRAIN/INJ JOINT/BURSA W/O US: CPT | Performed by: PHYSICIAN ASSISTANT

## 2017-10-20 NOTE — PROGRESS NOTES
Chief Complaint    Follow-up (left knee)      History of Present Illness:  Bradley Ag is a 57 y.o. male who presents for follow up of his left knee. The patient underwent a left knee arthroplasty on June 6, 2017 by Dr. Cricket Roach. The patient presents today complaining of constant pins and needle sensation associated with a deep ache that is consistent and present even at rest.  He rates his pain level at rest secondary anywhere from 5-10/10 VAS. he reports that he has been experiencing this since after his surgery. About a month ago he was having symptoms that would go down  His left leg. He has undergone significant physical therapy and reports that they had tried needles certain areas of his posterior thigh and calf and that has minimized and resolved the symptoms that were in his leg. Currently the main area of pain encompasses the entire left knee. He also reports that his knee is constantly swollen and is warm to touch. He denies fevers, chills and numbness. Dr. Laith Aden was able to get x-rays of her lumbar spine which showed evidence of L2-L3 compression. He also had an MRI of the lumbar spine on 10/8/17 which showed multilevel disc displacement with disc protrusions and multilevel facet hypertrophy. The only thing that has been helpful for his thus far was physical therapy and Neurontin. He is currently taking Neurontin 300mg, one tab in the morning, one tab in the afternoon and two tabs in the evening. He has been referred to the [address] [place] [address] clinic but has not been evaluated yet. Past Medical History:   Diagnosis Date    Arthritis     knee    Knee joint dislocation     left        Past Surgical History:   Procedure Laterality Date    CHOLECYSTECTOMY      KNEE ARTHROSCOPY Left 03/03/2017     LEFT KNEE ARTHROSCOPY, SYNOVECTOMY      OTHER SURGICAL HISTORY Left 06/06/2017    LEFT TOTAL KNEE ARTHROPLASTY        No family history on file.     Social History     Social History    of neuritis and RSD  Additionally I recommend the patient to gradually titrate up on his current gabapentin dosing. Instructions were provided to the patient on how he can increase his current dosing. The patient verbalized understanding. Today we went ahead and aspirated his left knee. The aspirate was evaluated for WBCs and cells in clinic which was found to be normal on the Chemstrip. This further validates the likelihood of it being infection. We will go ahead and send his aspirin for some cultures. All of this was fully communicated with Dr. Keshawn Charles as well. All of the patient's questions were fully answered today. After informed consent was provided, the patient lay supine on the exam table. The superolateral aspect of the left  knee was prepped with Chlora-prep. The skin and subcutaneous tissues were anesthetized with ethyl chloride spray and 1% lidocaine injected with a 20-gauge needle. The needle was withdrawn. Through the same tract, an 18-gauge needle attached to a 50-mL syringe was inserted into the left knee. Then, [ 45  ] mL of (yellowish fluid, ) was aspirated. The needle was withdrawn and the puncture site sealed with a Band-Aid. The patient tolerated the procedure well. Follow up in: We recommended he follow with Dr. Keshawn Charles next 2-4 weeks. 10:46 AM      Hilaria Maier PA-C  Orthopaedic Sports Medicine Physician Assistant    This dictation was performed with a verbal recognition program [facility]) and it was checked for errors. It is possible that there are still dictated errors within this office note. If so, please bring any errors to my attention for an addendum. All efforts were made to ensure that this office note is accurate.

## 2017-10-25 ENCOUNTER — OFFICE VISIT (OUTPATIENT)
Dept: ORTHOPEDIC SURGERY | Age: 62
End: 2017-10-25

## 2017-10-25 VITALS
SYSTOLIC BLOOD PRESSURE: 129 MMHG | BODY MASS INDEX: 29.9 KG/M2 | HEART RATE: 123 BPM | HEIGHT: 66 IN | DIASTOLIC BLOOD PRESSURE: 82 MMHG | WEIGHT: 186.07 LBS

## 2017-10-25 DIAGNOSIS — M25.462 EFFUSION OF KNEE JOINT, LEFT: Primary | ICD-10-CM

## 2017-10-25 DIAGNOSIS — Z96.652 STATUS POST TOTAL LEFT KNEE REPLACEMENT: ICD-10-CM

## 2017-10-25 PROCEDURE — 99213 OFFICE O/P EST LOW 20 MIN: CPT | Performed by: ORTHOPAEDIC SURGERY

## 2017-10-25 RX ORDER — CELECOXIB 200 MG/1
200 CAPSULE ORAL 2 TIMES DAILY
Qty: 60 CAPSULE | Refills: 3 | Status: SHIPPED | OUTPATIENT
Start: 2017-10-25 | End: 2018-01-26 | Stop reason: ALTCHOICE

## 2017-10-25 NOTE — PROGRESS NOTES
History of Present Illness:    Rebecca Foley is a 57 y.o. male   . He comes in today for evaluation of his knee. His pain is a 6    He continues to have swelling's discomfort and pain despite a well aligned left total knee. Of interest, he was seen by the physician assistant approximately 1 month ago. Following aspiration of his knee. He said his knee felt great. Four-week    The swelling has recurred and he has sharp pain in the left knee      Chief Complaint , a summary of previous treatments, injury, and current reason for the visit:    His complaint is pain of a 6 out of 10, which at times is so sharp and almost makes some give way. He has this pain occurs at night when he bends the knee and it is a sharp medial pain associated with the swelling    Examination:    Examination today shows a 2+ effusion. Pain is all medial parapatellar    When he bends his knee. He'll have a sharp medial pain. On extension as he allows needed, then it will be sharp pain. It literally makes him drop loses breath, and he'll actually have buckling, giving way. There is no warmth. There is no instability        Office Procedures:  No orders of the defined types were placed in this encounter. X-ray interpretation:  Prior x-rays from last visit. 3-4 weeks ago show a well aligned total knee. His cultures to date at 7 days are negative for growth. His blood tests are normal of the fluid    Treatment Plan: At this point. He has recurrent swelling which aspiration did help. I don't want to inject his knee at this time, but he said when his knee was aspirated and injected prior to this last visit he was doing well. I wanted to see if it doesn't improve with high-dose Celebrex as he seen no improvement with diclofenac along with Tumeric    Final impresson and disposition: Left total knee with postop inflammation, swelling. Discomfort improved with aspiration.   Recommendation Tumeric with

## 2017-10-26 ENCOUNTER — HOSPITAL ENCOUNTER (OUTPATIENT)
Dept: PHYSICAL THERAPY | Age: 62
Discharge: HOME | End: 2017-10-26
Admitting: ORTHOPAEDIC SURGERY

## 2017-10-26 DIAGNOSIS — M54.41 LOW BACK PAIN WITH BILATERAL SCIATICA, UNSPECIFIED BACK PAIN LATERALITY, UNSPECIFIED CHRONICITY: Primary | ICD-10-CM

## 2017-10-26 DIAGNOSIS — M54.42 LOW BACK PAIN WITH BILATERAL SCIATICA, UNSPECIFIED BACK PAIN LATERALITY, UNSPECIFIED CHRONICITY: Primary | ICD-10-CM

## 2017-10-26 NOTE — FLOWSHEET NOTE
[facility] [employer]  Orthopaedics and Sports Rehabilitation, [facility]    Physical Therapy Daily Treatment Note  Date:  10/26/2017    Patient Name:  Maame Carrera    :  1955  MRN: [id number]  Restrictions/Precautions:    Medical/Treatment Diagnosis Information:  Diagnosis: M54.42, M54.41 (ICD-10-CM) - Low back pain with bilateral sciatica, unspecified back pain laterality, unspecified chronicity  Treatment Diagnosis: lumbar hypomobility, HS/GS pain  Insurance/Certification information:  PT Insurance Information: PT BENEFITS 2017 FACILITY/HUMANA/EFFECTIVE 16/ ACTIVE/ DED 0/ OOP 6000/ $55 COPAY/ PAYS 100%/ 61 [facility]/ [address] REQ  Physician Information:  Referring Practitioner: Luciano Zarate MD  Plan of care signed (Y/N):     Date of Patient follow up with Physician: 17    G-Code (if applicable):      Date G-Code Applied:  10/26/17  PT G-Codes  Functional Assessment Tool Used: ZAIRA  Score: 44/100=44% deficit  Functional Limitation: Mobility: Walking and moving around  Mobility: Walking and Moving Around Current Status (): At least 40 percent but less than 60 percent impaired, limited or restricted  Mobility: Walking and Moving Around Goal Status ():  At least 20 percent but less than 40 percent impaired, limited or restricted    Progress Note: [x]  Yes  []  No  Next due by: Visit #10       Latex Allergy:  [x]NO      []YES  Preferred Language for Healthcare:   [x]English       []other:    Visit # Insurance Allowable   1 ORTHONET     Pain level:  6-7/10     SUBJECTIVE:  See eval    OBJECTIVE:      Standing Exam Normal Abnormal N/A Comments   Toe walk   X         Heel Walk X         Side bending X         Pelvic Height X         Fwd Bend- (aberrant juttering or innominate mvmt)   X       Extension   X       Trendelenburg   X   + bilaterally   Stork   X   Hypo mobile bilaterally L>R   SLS/SLS w rotation X     Reported L knee pain   STFB Test   X                   Seated Exam Normal Abnormal N/A Comments   Pelvic Height   X   Mild increase in iliac crest height on R   Seated Rotation X         Seated flexion   X   + pain   B hip IR X     Symmetrical ROM, reported L knee pain                           Supine Exam Normal Abnormal N/A Comments   Hip flexion X         Abduction X         ER X         IR X     ROM WFL, reported L knee and lateral thigh pain   WAYNE/Lc X     Unremarkable for hip pathology, position did cause L knee pain   Hip scour X         SLR       55 deg flex B      Crossed SLR X         Supine to sit X         SI distraction/compression X         Hip thrust X                                 Prone Exam Normal Abnormal N/A Comments   Prone knee bend X         Prone hip IR   X   Reported R hip pain, slightly less ROM on L   B Achilles reflex/Pheasant     X     PA/Spring   X   + pain at L2-L3, lumbar hypomobility   Prone Instability test     X     Sacral Spring/thrust X                                    ROM LEFT RIGHT Comments   Hip Flexion 100 100     Hip Abd 25 30     Hip ER 30 30     Hip IR 25 30     Hip Extension 8 10     Knee Ext 0 0     Knee Flex 129 145     Hamstring Flex -50 deg at 90/90  SLR 55 deg SLR 59 deg     Trunk flexion     No movement through lumbar spine, significant pain coming back to neutral standing   Trunk extension 0 0 No movement through lumbar spine, + compensation with knee flexion   Trunk rotation     Full rotation in each direction   Trunk SB Mod restriction Mod restriction Finger tips 1-2 inches above knee joint line                Strength LEFT RIGHT Comments   Multifidus Fair - Fair -     Transverse Ab poor poor     Iliopsoas 4/5 4+/5     Glute max 4-/5 4+/5     Glute med 4-/5 4+/5     Quad 4-/5 4+/5     HS 4/5 4+/5        TA Muscle Contraction Scale     Criteria                                                                                            Score  Quality of Contraction Not Present               [] 0                                                                                                           Rapid, Superificial                 [x] 1                                                                                                           Just Perceptible                    [] 2                                                                                                                                                    Gentle, Slow              [] 3     Substitution                                                                                                           Resting                      [x] 0                                                                                                           Moderate to Strong               [] 1                                                                                                            Subtle Perceptible                 [] 2                                                                                                           None              [] 3     Symmetry of Contraction                                                                                                           Unilateral                   [] 0                                                                                                           Bilateral/Asymmetrical                      [x] 1                                                                                                           Symmetrical              [] 2     Breathing                                                                                                           Inability/Difficulty Breathing during contraction                                                                     [x] 0                                                                                                           Able to hold contraction while Breathing                                                                                  [] 1     Holding                                                                                                           Able to Hold Contraction <10 s                                                                                                [x] 0                                                                                                           Able to Hold Contraction >10 s                                                                                                [] 1                                                                                                                                    2/10  Adapted from [address], Copyright 2009        Myotomes Normal Abnormal Comments   Hip flexion (L1-L2) X       Knee extension (L2-L4) X       Dorsiflexion (L4-L5) X       Great Toe Ext (L5) X       Ankle Eversion (S1-S2) X       Ankle PF(S1-S2) X             Dermatomes Normal Abnormal Comments   inguinal area (L1)  X       anterior mid-thigh (L2) X       distal ant thigh/med knee (L3)   X Decreased on L   medial lower leg and foot (L4) X       lateral lower leg and foot (L5) X       posterior calf (S1) X       medial calcaneus (S2) X             Neural dynamic tension testing Normal Abnormal Comments   Slump Test  - Degree of knee flexion:          SLR          0-30 X       30-70   X + for HS tightness   Femoral nerve (L2-4) X          Reflexes Normal Abnormal Comments   S1-2 Seated achilles     Diminished bilaterally   S1-2 Prone knee bend     Not assessed   L3-4 Patellar tendon X on R   Deferred on L secondary to TKA and current pain   C5-6 Biceps     Not assessed   C6 Brachioradialis     Not assessed   C7-8 Triceps     Not assessed   Clonus X       Babinski         Cardenas's            Joint mobility:                         []Normal                                   [x]Hypo []Hyper     Palpation: +TTP at L2-3     Functional Mobility/Transfers: Independent, but with difficulty secondary to pain     Posture: Stiff low back. Rounded shoulders     Gait: + limp secondary to knee pain. Decreased pelvic rotation. RESTRICTIONS/PRECAUTIONS:     Exercises/Interventions:     Exercise/Equipment Resistance/Repetitions Other comments   Stretching     Hamstring 3x30\" Supine   Hip Flexion     ITB     Grion     Quad     Inclined Calf     Towel Pull          SLR     Supine 3x10 3#    Prone     Abduction 3x10 3#    Adducton     SLR+          Isometrics     Quad sets     Transverse Abdominis  10x10\"         Patellar Glides     Medial     Superior     Inferior          ROM     Passive     Active     Weight Shift     Hang Weights     Sheet Pulls     Ankle Pumps          CKC     Calf raises     Wall sits     Step ups     1 leg stand     Squatting     CC TKE     Balance          PRE     Extension  RANGE:   Flexion  RANGE:        Cable Column          Leg Press  RANGE:        Bike     Treadmill            Plan for next session: progress as tolerated, focus on core progression    Patient Education:   10/26/17 Pt educated on diagnosis, prognosis and PT plan of care. Educated on  [address]. Pt questions were addressed and answered. Therapeutic Exercise and NMR EXR:  [x] (16880) Provided verbal/tactile cueing for activities related to strengthening, flexibility, endurance, ROM for improvements in LE, proximal hip, and core control with self care, mobility, lifting, ambulation.  [] (38184) Provided verbal/tactile cueing for activities related to improving balance, coordination, kinesthetic sense, posture, motor skill, proprioception  to assist with LE, proximal hip, and core control in self care, mobility, lifting, ambulation and eccentric single leg control.      NMR and Therapeutic Activities:    [] (13721 or ) Provided verbal/tactile cueing for activities related to improving balance, coordination, kinesthetic sense, posture, motor skill, proprioception and motor activation to allow for proper function of core, proximal hip and LE with self care and ADLs  [] (89106) Gait Re-education- Provided training and instruction to the patient for proper LE, core and proximal hip recruitment and positioning and eccentric body weight control with ambulation re-education including up and down stairs     Home Exercise Program:    [x] (71743) Reviewed/Progressed HEP activities related to strengthening, flexibility, endurance, ROM of core, proximal hip and LE for functional self-care, mobility, lifting and ambulation/stair navigation   [] (17675)Reviewed/Progressed HEP activities related to improving balance, coordination, kinesthetic sense, posture, motor skill, proprioception of core, proximal hip and LE for self care, mobility, lifting, and ambulation/stair navigation      Manual Treatments:    [x] (59385) Provided manual therapy to mobilize LE, proximal hip and/or LS spine soft tissue/joints for the purpose of modulating pain, promoting relaxation,  increasing ROM, reducing/eliminating soft tissue swelling/inflammation/restriction, improving soft tissue extensibility and allowing for proper ROM for normal function with self care, mobility, lifting and ambulation. Dry needling manual therapy: consisted on the placement of Spring Tens 60mm and Seirin 75mm needles in the following muscles:  L3-L4 L and R Multifidi and Semitendinosus/Bicep Femoris.  A 75mm at  april L3-4 and 60mm needle at all other locations was inserted, piston, rotated, and coned to produce intramuscular mobilization.  In situ time at multifidi of 8' L3-4. These techniques were used to restore functional range of motion, reduce muscle spasm and induce healing in the corresponding musculature.  (16615)  Clean Technique was utilized today while applying Dry needling treatment.  The treatment sites where cleaned with 70% solution of  isopropyl alcohol .  The PT washed their hands and utilized treatment gloves along with hand  prior to inserting the needles.  All needles where removed and discarded in the appropriate sharps container.        PT reviewed precautions, contraindications, and indications with pt in addition to application of dry needling within established plan of care and expected outcomes; pt verbalized understanding with all questions answered. Pt had signed consent form for dry needling and PT obtained written consent with updated plan of care from MD before beginning. Modalities:  None    Charges:  Timed Code Treatment Minutes: 60   Total Treatment Minutes: 115     [x] EVAL  [x] MC(47611) x  2   [] IONTO  [] NMR (10992) x      [] VASO  [x] Manual (66861) x  2    [] Other:  [] TA x       [] Mech Traction (70569)  [] ES(attended) (49442)      [] ES (un) (01623):     GOALS:   Patient stated goal: Return to bonny daily functions     Therapist goals for Patient:   Short Term Goals: To be achieved in: 2 weeks  1. Independent in HEP and progression per patient tolerance, in order to prevent re-injury. 2. Patient will have a decrease in pain to facilitate improvement in movement, function, and ADLs as indicated by Functional Deficits.     Long Term Goals: To be achieved in: 6 weeks  1. Disability index score of 20% or less for the ZAIRA to assist with reaching prior level of function. 2. Patient will demonstrate increased AROM to WNL, good LS mobility, good hip ROM to allow for proper joint functioning as indicated by patients Functional Deficits. 3. Patient will demonstrate an increase in L LE strength to 4+/5 to allow for proper functional mobility as indicated by patients Functional Deficits. 4. Patient will return to ADLs, IADLs and functional activities without increased symptoms or restriction.    5. Patient will tolerate standing/ambulation for >2 hours with 0-2/10 reports of pain to allow for full return to

## 2017-10-26 NOTE — FLOWSHEET NOTE
The [address] [place] [address]      Physical Therapy Certification    Dear Referring Practitioner: Tyson Rahman MD,    We had the pleasure of evaluating the following patient for physical therapy services at [address] [place] [address]. A summary of our findings can be found in the initial assessment below. This includes our plan of care. If you have any questions or concerns regarding these findings, please do not hesitate to contact me at the office phone number checked above. Thank you for the referral.       Physician Signature:_______________________________Date:__________________  By signing above (or electronic signature), therapists plan is approved by physician      Patient: Rebecca Foley   : 1955   MRN: [id number]  Referring Physician: Referring Practitioner: Tyson Rahman MD      Evaluation Date: 10/26/2017      Medical Diagnosis Information:  Diagnosis: M54.42, M54.41 (ICD-10-CM) - Low back pain with bilateral sciatica, unspecified back pain laterality, unspecified chronicity   Treatment Diagnosis: lumbar hypomobility, HS/GS pain                                         Insurance information: PT Insurance Information: PT BENEFITS 2017 FACILITY/HUMANA/EFFECTIVE 16/ ACTIVE/ DED 0/ OOP 6000/ $55 COPAY/ PAYS 100%/ 60 VPCY/ ORTHONET AUTH REQ     Precautions/ Contra-indications:   Latex Allergy:  [x]NO      []YES  Preferred Language for Healthcare:   [x]English       []other:     SUBJECTIVE: Patient stated complaint: Pt has a long standing history of LBP. Pain first began about 20 years, no specific injury to his low back, just wear and tear over time from sports and his job. Pt has been treated by a chiropractor and at the [facility] in the past. States that at the [facility] he was treated with \"a series of needles. \" He had a L TKA in  and was completing PT for that when he began to be limited in progressions secondary to persistent calf and HS pain. Based on pt's status pt's surgeon and PT felt that symptoms were coming from his back. At follow up with MD x-rays were taken that showed degenerative changes of his back, he referred him to get an MRI for his back, results of that showed further degenerative changes. Pt was also referred to the [facility], he is seeing them on 10/30. MD also wanted him to begin PT for his back to focus on lumbar stability and pain reduction. Pt has not been having specific low back pain, but he does have persistent HS and calf pain that is exacerbated by knee flexion and walking, \"it was horrible knee pain. \" Pt did have one session of DN during PT for his knee that he states really helped with her calf and HS pain and it has been feeling significantly better. Pt has difficulty with: walking, prolonged driving- needs to stop and get out of the car every 45-60 min to walk around, stairs. Pt states that his limitations are secondary to pain, not limited d/t feelings of instability or giving way. Goals: Pt would like to get back to work. Get back to normal daily tasks and routine. Hobbies: golf    Relevant Medical History: see intake form  Functional Disability Index/G-Codes:  PT G-Codes  Functional Assessment Tool Used: ZAIRA  Score: 44/100=44% deficit  Functional Limitation: Mobility: Walking and moving around  Mobility: Walking and Moving Around Current Status (): At least 40 percent but less than 60 percent impaired, limited or restricted  Mobility: Walking and Moving Around Goal Status (): At least 20 percent but less than 40 percent impaired, limited or restricted    Pain Scale: 6-7/10  Easing factors: rest  Provocative factors: flexion, prolonged walked    Type: []Constant   [x]Intermittent  []Radiating []Localized []other:     Numbness/Tingling: No    Occupation/School: Facilities worker, currently off work. Duties require standind and walking all day, up and down ladders.     Living limp secondary to knee pain. Decreased pelvic rotation. [x] Patient history, allergies, meds reviewed. Medical chart reviewed. See intake form. Review Of Systems (ROS):  [x]Performed Review of systems (Integumentary, CardioPulmonary, Neurological) by intake and observation. Intake form has been scanned into medical record. Patient has been instructed to contact their primary care physician regarding ROS issues if not already being addressed at this time. Co-morbidities/Complexities (which will affect course of rehabilitation):   [x]None           Arthritic conditions   []Rheumatoid arthritis (M05.9)  []Osteoarthritis (M19.91)   Cardiovascular conditions   []Hypertension (I10)  []Hyperlipidemia (E78.5)  []Angina pectoris (I20)  []Atherosclerosis (I70)   Musculoskeletal conditions   []Disc pathology   []Congenital spine pathologies   []Prior surgical intervention  []Osteoporosis (M81.8)  []Osteopenia (M85.8)   Endocrine conditions   []Hypothyroid (E03.9)  []Hyperthyroid Gastrointestinal conditions   []Constipation (O97.23)   Metabolic conditions   []Morbid obesity (E66.01)  []Diabetes type 1(E10.65) or 2 (E11.65)   []Neuropathy (G60.9)     Pulmonary conditions   []Asthma (J45)  []Coughing   []COPD (J44.9)   Psychological Disorders  []Anxiety (F41.9)  []Depression (F32.9)   []Other:   [x]Other:           Barriers to/and or personal factors that will affect rehab potential:              []Age  []Sex              []Motivation/Lack of Motivation                        []Co-Morbidities              []Cognitive Function, education/learning barriers              []Environmental, home barriers              []profession/work barriers  []past PT/medical experience  [x]other:  Justification: Pt continues to have knee pain following TKA. Psychosocial impact of chronic pain. Falls Risk Assessment (30 days):   [x] Falls Risk assessed and no intervention required.   [] Falls Risk assessed and Patient and/or measurable assessment of functional outcome. [] EVAL (LOW) 09234 (typically 20 minutes face-to-face)  [x] EVAL (MOD) 32344 (typically 30 minutes face-to-face)  [] EVAL (HIGH) 01010 (typically 45 minutes face-to-face)  [] RE-EVAL     PLAN:     Frequency/Duration:  1-2 days per week for 6 Weeks:  Interventions:  [x]  Therapeutic exercise including: strength training, ROM, for LE, Glutes and core   [x]  NMR activation and proprioception for glutes , LE and Core   [x]  Manual therapy as indicated for Hip complex, LE and spine to include: Dry Needling/IASTM, STM, PROM, Gr I-IV mobilizations, manipulation. [x]  Modalities as needed that may include: thermal agents, E-stim, Biofeedback, US, iontophoresis as indicated  [x]  Patient education on joint protection, postural re-education, activity modification, progression of HEP. HEP instruction:   Initiated 10/26/17. Printed hand out given. Pt demonstrated proper form of each exercise and expressed verbal understanding of frequency and duration. GOALS:  Patient stated goal: Return to bonny daily functions    Therapist goals for Patient:   Short Term Goals: To be achieved in: 2 weeks  1. Independent in HEP and progression per patient tolerance, in order to prevent re-injury. 2. Patient will have a decrease in pain to facilitate improvement in movement, function, and ADLs as indicated by Functional Deficits. Long Term Goals: To be achieved in: 6 weeks  1. Disability index score of 20% or less for the ZAIRA to assist with reaching prior level of function. 2. Patient will demonstrate increased AROM to WNL, good LS mobility, good hip ROM to allow for proper joint functioning as indicated by patients Functional Deficits. 3. Patient will demonstrate an increase in L LE strength to 4+/5 to allow for proper functional mobility as indicated by patients Functional Deficits.    4. Patient will return to ADLs, IADLs and functional activities without increased symptoms or restriction. 5. Patient will tolerate standing/ambulation for >2 hours with 0-2/10 reports of pain to allow for full return to work. Electronically signed by:  Graciela Leong PT, DPT  Physical Therapist  [email or web address]  [email or web address]

## 2017-11-01 ENCOUNTER — HOSPITAL ENCOUNTER (OUTPATIENT)
Dept: PHYSICAL THERAPY | Age: 62
Discharge: HOME | End: 2017-11-30
Attending: ORTHOPAEDIC SURGERY | Admitting: ORTHOPAEDIC SURGERY

## 2017-11-02 ENCOUNTER — HOSPITAL ENCOUNTER (OUTPATIENT)
Dept: PHYSICAL THERAPY | Age: 62
Discharge: HOME | End: 2017-11-02
Admitting: ORTHOPAEDIC SURGERY

## 2017-11-02 NOTE — FLOWSHEET NOTE
insurance approval, pt states he should know something by Monday. Pt states that the organic supplement Dr. Linda Haji told him to get for pain did not help at all. Pt is getting frustrated with his continued pain and lack of improvement, \"It's getting old. \"    OBJECTIVE:      Standing Exam Normal Abnormal N/A Comments   Toe walk   X         Heel Walk X         Side bending X         Pelvic Height X         Fwd Bend- (aberrant juttering or innominate mvmt)   X       Extension   X       Trendelenburg   X   + bilaterally   Stork   X   Hypo mobile bilaterally L>R   SLS/SLS w rotation X     Reported L knee pain   STFB Test   X                   Seated Exam Normal Abnormal N/A Comments   Pelvic Height   X   Mild increase in iliac crest height on R   Seated Rotation X         Seated flexion   X   + pain   B hip IR X     Symmetrical ROM, reported L knee pain                           Supine Exam Normal Abnormal N/A Comments   Hip flexion X         Abduction X         ER X         IR X     ROM WFL, reported L knee and lateral thigh pain   WAYNE/Lc X     Unremarkable for hip pathology, position did cause L knee pain   Hip scour X         SLR       55 deg flex B      Crossed SLR X         Supine to sit X         SI distraction/compression X         Hip thrust X                                 Prone Exam Normal Abnormal N/A Comments   Prone knee bend X         Prone hip IR   X   Reported R hip pain, slightly less ROM on L   B Achilles reflex/Pheasant     X     PA/Spring   X   + pain at L2-L3, lumbar hypomobility   Prone Instability test     X     Sacral Spring/thrust X                                    ROM LEFT RIGHT Comments   Hip Flexion 100 100     Hip Abd 25 30     Hip ER 30 30     Hip IR 25 30     Hip Extension 8 10     Knee Ext 0 0     Knee Flex 129 145     Hamstring Flex -50 deg at 90/90  SLR 55 deg SLR 59 deg     Trunk flexion     No movement through lumbar spine, significant pain coming back to neutral standing       Neural dynamic tension testing Normal Abnormal Comments   Slump Test  - Degree of knee flexion:          SLR          0-30 X       30-70   X + for HS tightness   Femoral nerve (L2-4) X          Reflexes Normal Abnormal Comments   S1-2 Seated achilles     Diminished bilaterally   S1-2 Prone knee bend     Not assessed   L3-4 Patellar tendon X on R   Deferred on L secondary to TKA and current pain   C5-6 Biceps     Not assessed   C6 Brachioradialis     Not assessed   C7-8 Triceps     Not assessed   Clonus X       Babinski         Cardenas's            Joint mobility:                         []Normal                                   [x]Hypo                        []Hyper     Palpation: +TTP at L2-3     Functional Mobility/Transfers: Independent, but with difficulty secondary to pain     Posture: Stiff low back. Rounded shoulders     Gait: + limp secondary to knee pain. Decreased pelvic rotation.      TA Muscle Contraction Scale    Criteria Score  Quality of Contraction   Not Present [x] 0   Rapid, Superificial [] 1   Just Perceptible [] 2     Gentle, Slow [] 3    Substitution   Resting  [x] 0   Moderate to Strong [] 1    Subtle Perceptible [] 2   None [] 3    Symmetry of Contraction   Unilateral  [x] 0   Bilateral/Asymmetrical  [] 1   Symmetrical  [] 2    Breathing     Inability/Difficulty Breathing during contraction [x] 0   Able to hold contraction while Breathing [] 1    Holding   Able to Hold Contraction <10 s [x] 0   Able to Hold Contraction >10 s [] 1      0/10  Adapted from Aura Davey al, Copyright 2009                          RESTRICTIONS/PRECAUTIONS:     Exercises/Interventions:     Exercise/Equipment Resistance/Repetitions Other comments   Stretching     Hamstring 3x30\" Supine   Hip Flexion     ITB     Grion     Quad     Inclined Calf     Towel Pull          SLR     Supine 3x10 3# bilateral    Prone     Abduction 3x10 3# bilateral    Adducton     SLR+          Isometrics     Quad sets     Transverse ASSESSMENT:  Pt unable to properly activate TA muscle during session, 0/10 on TA muscle contraction scale, pt required max verbal and tactile cues to educate on muscle activation. TA contraction added to HEP. Pt significantly limited in ability to perform strength exercises secondary to pain in the L knee. Pt presents with extreme exacerbation of L knee pain with movement as well as prolonged positioning. Pt requires PT follow up to address ROM, strength and functional mobility deficits. Pt tolerated dry needling well with good response and without c/o dizziness nor light headedness afterward. Pt presents with latent trigger points at adductor judd, but had much improved muscle mobility t/o posterior thigh and lumbar region. Continues to display significant needle grabbing  at both Multifidi resulting in addition of stimulation to area that lead to improved mobility at area with decreased c/o sensitivity. PT encouraged pt to drink extra water and rest as needed this date and call PT with any questions or concerns. Pt verbalized understanding with all questions answered. Treatment/Activity Tolerance:  [x] Patient tolerated treatment well [] Patient limited by fatique  [] Patient limited by pain  [] Patient limited by other medical complications  [] Other:     Prognosis: [x] Good [] Fair  [] Poor    Patient Requires Follow-up: [x] Yes  [] No    PLAN: See eval  [x] Continue per plan of care [] Alter current plan (see comments)  [] Plan of care initiated [] Hold pending MD visit [] Discharge    Electronically signed by: Zahra Riley PT, DPT  Physical Therapist  [id number]  [email or web address]      Camron Doran. Ginger Jasso DPT, [id number]  Physical Therapist  [email or web address]

## 2017-11-20 ENCOUNTER — OFFICE VISIT (OUTPATIENT)
Dept: ORTHOPEDIC SURGERY | Age: 62
End: 2017-11-20

## 2017-11-20 VITALS
HEART RATE: 87 BPM | HEIGHT: 66 IN | DIASTOLIC BLOOD PRESSURE: 82 MMHG | BODY MASS INDEX: 29.89 KG/M2 | WEIGHT: 186 LBS | SYSTOLIC BLOOD PRESSURE: 129 MMHG

## 2017-11-20 DIAGNOSIS — M22.2X2 PATELLOFEMORAL STRESS SYNDROME OF LEFT KNEE: ICD-10-CM

## 2017-11-20 DIAGNOSIS — G90.523 REFLEX SYMPATHETIC DYSTROPHY OF BOTH LOWER EXTREMITIES: Primary | ICD-10-CM

## 2017-11-20 DIAGNOSIS — M25.562 ACUTE PAIN OF LEFT KNEE: ICD-10-CM

## 2017-11-20 DIAGNOSIS — Z96.652 STATUS POST TOTAL LEFT KNEE REPLACEMENT: ICD-10-CM

## 2017-11-20 PROCEDURE — 20610 DRAIN/INJ JOINT/BURSA W/O US: CPT | Performed by: ORTHOPAEDIC SURGERY

## 2017-11-20 PROCEDURE — 99213 OFFICE O/P EST LOW 20 MIN: CPT | Performed by: ORTHOPAEDIC SURGERY

## 2017-11-20 NOTE — PROGRESS NOTES
History of Present Illness:    Wanda Morales is a 57 y.o. male   is back here today for follow-up evaluation of his left knee. Surgery was in June any 5 months postop. Please had a rather rough time of that with continued pain and discomfort. He notes his pain as a 6 out of 10      Chief Complaint , a summary of previous treatments, injury, and current reason for the visit:    He continues with pain in the knee. He says it feels full, but points the medial aspect of the knee. His source of extreme pain. He had 1 epidural injection for an MRI which showed evidence of spinal stenosis and disc bulging and this epidural injection did not help. He notes dry needling does help the left knee, but he also notes specific discomfort in the knee    Examination:    Examination today shows a mild 1+ effusion. He has point discomfort along the medial epicondylar region of his left knee. On touching visits of like an electrical sensations. He says causes sharp pain. It hurts directly in this area along with diffuse anterior knee pain. There is mild warmth. Prior culture showed no evidence of infection. He showed limited improvement with gabapentin but notes it helps him sleep at night. Because a point discomfort directly along the medial area. I've recommended an isolated injection for trigger point pain          Office Procedures:  No orders of the defined types were placed in this encounter. Treatment Plan:  Recommendation left trigger point left medial epicondylar region femoral epicondyle left knee             PROCEDURE NOTE: Left KNEE ASPIRATION & INJECTION  11/20/2017 at 11:51 AM   Procedure: Aspiration & Injection  Verbal consent was obtained. Risks and benefits were explained. Questions were encouraged and answered.       Timeout Verification Completed including:    Correct patient: Wanda Morales was identified    Correct procedure    Correct site & side    Correct

## 2017-11-20 NOTE — PROGRESS NOTES
4 cc's Depo  Lot# [id number]    NDC#0330-8569-54    Exp date: 04/2020    Injection site: LEFT KNEE        2 cc's Lido  Lot # [id number] [facility]#[id number]    Exp Date: 1/2019    Injection site: LEFT KNEE

## 2017-12-01 ENCOUNTER — HOSPITAL ENCOUNTER (OUTPATIENT)
Dept: PHYSICAL THERAPY | Age: 62
Discharge: HOME | End: 2017-12-31
Attending: ORTHOPAEDIC SURGERY | Admitting: ORTHOPAEDIC SURGERY

## 2017-12-11 ENCOUNTER — OFFICE VISIT (OUTPATIENT)
Dept: ORTHOPEDIC SURGERY | Age: 62
End: 2017-12-11

## 2017-12-11 VITALS
WEIGHT: 186 LBS | DIASTOLIC BLOOD PRESSURE: 80 MMHG | HEART RATE: 84 BPM | HEIGHT: 66 IN | BODY MASS INDEX: 29.89 KG/M2 | SYSTOLIC BLOOD PRESSURE: 126 MMHG

## 2017-12-11 DIAGNOSIS — M25.462 EFFUSION OF KNEE JOINT, LEFT: ICD-10-CM

## 2017-12-11 DIAGNOSIS — G90.523 REFLEX SYMPATHETIC DYSTROPHY OF BOTH LOWER EXTREMITIES: ICD-10-CM

## 2017-12-11 DIAGNOSIS — M22.2X2 PATELLOFEMORAL STRESS SYNDROME OF LEFT KNEE: ICD-10-CM

## 2017-12-11 DIAGNOSIS — M17.12 PRIMARY OSTEOARTHRITIS OF LEFT KNEE: Primary | Chronic | ICD-10-CM

## 2017-12-11 PROCEDURE — 99214 OFFICE O/P EST MOD 30 MIN: CPT | Performed by: ORTHOPAEDIC SURGERY

## 2017-12-11 NOTE — LETTER
[facility] [address] [facility] [place] [address]  Phone: [phone]  Fax: [phone]    Roverto Senior MD        December 11, 2017     Patient: Bradley Ag   YOB: 1955   Date of Visit: 12/11/2017       To Whom It May Concern: It is my medical opinion that Talia Ramos should remain out of work until approximately 03/15/2017. Formal forms and physician dictation to follow. If you have any questions or concerns, please don't hesitate to call.     Sincerely,            Roverto Senior MD

## 2017-12-11 NOTE — PROGRESS NOTES
History of Present Illness:    Maame Carrera is a 57 y.o. male   . He comes in today for evaluation of problems associated with persistent pain and discomfort in his left knee which underwent a joint arthroplasty. He notes his pain as a 6 out of 10. This represents a follow-up visit consultation review of his current status. It also requires a review of his current work potential and his ability to return to his job      Chief Complaint , a summary of previous treatments, injury, and current reason for the visit:    He's here because of continued pain in his affected left knee. He notes specific pain and discomfort along the medial aspect of the knee and mid lateral joint. He is clear evidence of hypersensitivity. He notes he cannot sit more than 5 minutes without having to stand    He cannot stand without having to walk around. His pain is constant and unrelieved with either sedentary or increase activities. He's been seen by a back specialist.  His had epidural steroids without improvement. Localized femoral nerve block has helped. Gabapentin does improve his discomfort, but it makes him confused and Percocet and other narcotics. It helped his pain, but also affect his mental stability. He is here for evaluation. Understanding his continued neurologic dysfunction and a left total knee    Examination:    His examination today confirms severe medial pain along the paramedial saphenous region along the lateral joint. He has areas of discomfort in the posterior aspect of the joint line with pain. He radiating saphenous nerve up to the femoral area. Lateral joint discomfort and pain IT band with areas of discomfort along the femoral epicondylar region. There is no joint discomfort. There is no warmth. There is no instability. Calves are soft        Office Procedures:  No orders of the defined types were placed in this encounter.         X-ray interpretation:  His prior x-ray reevaluation confirms an anatomically aligned total knee on his affected left side    Treatment Plan: At this point, it had a lengthy discussion and evaluation. He is in pain management. He needs to get his gabapentin and narcotic pain medicine from his doctor's    His spinal 7 not helped in the steroid injections have not improved. It    The localized block has helped. I've recommended he talk to his pain management doctor about a local nerve block. Possible future radiofrequency ablation for genu neuropathy    Final impresson and disposition: He is going to be continuing and pain management. The following represents answers to questions, which came from the behavioral health services. Specifically referring to the . Specifically, Esequiel Ball    With regard to the multiple page questionnaire the following has been appraised. I've had the opportunity of reviewing this patient's job description. I reviewed his position summary along with ischial competency and the requirements and responsibilities of the job. With regard to the questionnaire submitted the following answers are given. With regard to question, one    I did receive a copy of the job description. With regard to question two    The employee. He does have a physical condition and also a mental condition which is affected by the medications that limited one or more of his major life activities. This clearly means. It is impossible for him to perform his job description or to perform many of the activities which are considered capable within the normal population due to his constant discomfort and his inability to find any relief without medications, which affects both his mental status and his ability to have cognitive interface with other people. Re: Question 3    The limitation for this problem is expected to continue for a period of no less than 3 months.     He is currently treatment. He is undergoing neurologic evaluation. Hopefully, this will improve his discomfort. Regarding question. 10.    The anticipated reevaluation time would be March 15    All of these questions of been answered regarding the patient's current medical status. His condition and full detailed review his medical situation                        Sadaf Dumont. Jade Beasley M.D. This dictation was performed with a verbal recognition program and it was checked for errors. It is possible that there are still dictated errors within this office note. Any errors should be brought immediately to my attention for correction.   All efforts were made to ensure that this office note is accurate

## 2018-01-09 ENCOUNTER — TELEPHONE (OUTPATIENT)
Dept: ORTHOPEDIC SURGERY | Age: 63
End: 2018-01-09

## 2018-01-18 ENCOUNTER — OFFICE VISIT (OUTPATIENT)
Dept: ORTHOPEDIC SURGERY | Age: 63
End: 2018-01-18

## 2018-01-18 VITALS
BODY MASS INDEX: 29.9 KG/M2 | SYSTOLIC BLOOD PRESSURE: 134 MMHG | WEIGHT: 186.07 LBS | HEART RATE: 91 BPM | HEIGHT: 66 IN | DIASTOLIC BLOOD PRESSURE: 84 MMHG

## 2018-01-18 DIAGNOSIS — M25.562 LEFT KNEE PAIN, UNSPECIFIED CHRONICITY: Primary | ICD-10-CM

## 2018-01-18 DIAGNOSIS — M25.562 LEFT KNEE PAIN, UNSPECIFIED CHRONICITY: ICD-10-CM

## 2018-01-18 LAB
C-REACTIVE PROTEIN: 1.3 MG/L (ref 0–5.1)
SEDIMENTATION RATE, ERYTHROCYTE: 1 MM/HR (ref 0–20)

## 2018-01-18 PROCEDURE — 99214 OFFICE O/P EST MOD 30 MIN: CPT | Performed by: ORTHOPAEDIC SURGERY

## 2018-01-18 PROCEDURE — 73562 X-RAY EXAM OF KNEE 3: CPT | Performed by: ORTHOPAEDIC SURGERY

## 2018-01-18 PROCEDURE — 20610 DRAIN/INJ JOINT/BURSA W/O US: CPT | Performed by: ORTHOPAEDIC SURGERY

## 2018-01-23 ENCOUNTER — TELEPHONE (OUTPATIENT)
Dept: ORTHOPEDIC SURGERY | Age: 63
End: 2018-01-23

## 2018-01-26 ENCOUNTER — OFFICE VISIT (OUTPATIENT)
Dept: INTERNAL MEDICINE CLINIC | Age: 63
End: 2018-01-26

## 2018-01-26 VITALS
BODY MASS INDEX: 30.7 KG/M2 | RESPIRATION RATE: 14 BRPM | HEIGHT: 66 IN | WEIGHT: 191 LBS | HEART RATE: 79 BPM | DIASTOLIC BLOOD PRESSURE: 79 MMHG | SYSTOLIC BLOOD PRESSURE: 130 MMHG

## 2018-01-26 DIAGNOSIS — Z72.89 OTHER PROBLEMS RELATED TO LIFESTYLE: ICD-10-CM

## 2018-01-26 DIAGNOSIS — Z23 NEED FOR PROPHYLACTIC VACCINATION AND INOCULATION AGAINST VARICELLA: ICD-10-CM

## 2018-01-26 DIAGNOSIS — M15.9 PRIMARY OSTEOARTHRITIS INVOLVING MULTIPLE JOINTS: ICD-10-CM

## 2018-01-26 DIAGNOSIS — Z72.0 NICOTINE ABUSE: ICD-10-CM

## 2018-01-26 DIAGNOSIS — E78.00 PURE HYPERCHOLESTEROLEMIA: Primary | ICD-10-CM

## 2018-01-26 DIAGNOSIS — Z23 NEED FOR PROPHYLACTIC VACCINATION WITH TETANUS-DIPHTHERIA (TD): ICD-10-CM

## 2018-01-26 DIAGNOSIS — Z12.11 SCREENING FOR COLON CANCER: ICD-10-CM

## 2018-01-26 PROCEDURE — 99203 OFFICE O/P NEW LOW 30 MIN: CPT | Performed by: INTERNAL MEDICINE

## 2018-01-26 RX ORDER — TETANUS AND DIPHTHERIA TOXOIDS ADSORBED 2; 2 [LF]/.5ML; [LF]/.5ML
0.5 INJECTION INTRAMUSCULAR ONCE
Qty: 0.5 ML | Refills: 0 | Status: SHIPPED | OUTPATIENT
Start: 2018-01-26 | End: 2018-01-26

## 2018-01-26 RX ORDER — HYDROCODONE BITARTRATE AND ACETAMINOPHEN 5; 325 MG/1; MG/1
1 TABLET ORAL EVERY 6 HOURS PRN
COMMUNITY
End: 2018-04-19 | Stop reason: SDUPTHER

## 2018-02-05 ENCOUNTER — TELEPHONE (OUTPATIENT)
Dept: INTERNAL MEDICINE CLINIC | Age: 63
End: 2018-02-05

## 2018-02-05 NOTE — TELEPHONE ENCOUNTER
Dear Harman Sarah, please note that your lab results from the most recent blood draw shows kidney and liver functions were normal.  The bad cholesterol is high (LDL). Please focus on low-fat (i.e. greasy foods) and low-sugar foods, especially reducing processed sugars. Natural sugars through fruit, vegetables and natural whole-grains (i.e. Rice) are recommended over processed foods (i.e. Bread/pasta are not natural but processed rather, regardless if it's whole grain). If you have any questions or concerns, please don't hesitate to call. Patient advised and had no further questions.

## 2018-02-08 ENCOUNTER — OFFICE VISIT (OUTPATIENT)
Dept: ORTHOPEDIC SURGERY | Age: 63
End: 2018-02-08

## 2018-02-08 VITALS
DIASTOLIC BLOOD PRESSURE: 78 MMHG | HEART RATE: 70 BPM | SYSTOLIC BLOOD PRESSURE: 125 MMHG | HEIGHT: 66 IN | WEIGHT: 191 LBS | BODY MASS INDEX: 30.7 KG/M2

## 2018-02-08 DIAGNOSIS — Z96.652 HISTORY OF TOTAL KNEE ARTHROPLASTY, LEFT: ICD-10-CM

## 2018-02-08 DIAGNOSIS — M25.562 LEFT KNEE PAIN, UNSPECIFIED CHRONICITY: Primary | ICD-10-CM

## 2018-02-08 PROCEDURE — 99214 OFFICE O/P EST MOD 30 MIN: CPT | Performed by: ORTHOPAEDIC SURGERY

## 2018-02-08 NOTE — PROGRESS NOTES
Narrative    None       Current Outpatient Prescriptions   Medication Sig Dispense Refill    HYDROcodone-acetaminophen (NORCO) 5-325 MG per tablet Take 1 tablet by mouth every 6 hours as needed for Pain.  gabapentin (NEURONTIN) 300 MG capsule Take 1 capsule by mouth 3 times daily 90 capsule 3     No current facility-administered medications for this visit. Allergies   Allergen Reactions    Other Anaphylaxis    Percocet [Oxycodone-Acetaminophen] Shortness Of Breath     Felt like a heart attack      Morphine Other (See Comments)     Hallucinations  Lights me up       Review of Systems:  A 14 point review of systems was completed by the patient and is available in the media section of the scanned medical record and was reviewed on 2/8/2018. The review is negative with the exception of those things mentioned in the HPI and Past Medical History     Vital Signs:   /78   Pulse 70   Ht 5' 6\" (1.676 m)   Wt 191 lb (86.6 kg)   BMI 30.83 kg/m²     General Exam:   Mental Status: The patient is oriented to time, place and person. The patient's mood and affect are appropriate. Neurological: The patient has good coordination. There is no weakness or sensory deficit. Knee Examination: Left    Skin:  There are no skin lesions, cellulitis, or extreme edema in the lower extremities. Sensation is grossly intact to light touch bilaterally lower extremity. The patient has warm and well-perfused Bilateral lower extremities with brisk capillary refill. Inspection:  Well-healed old anterior knee incision is fully healed, mild effusion present,  no gross deformities, no signs of infection. Palpation: He has exquisite tenderness around the patella to light touch especially over the saphenous nerve. Range of Motion:  0-120    Strength:   Motor is grossly intact    Special Tests:  No ligament instability     Gait: antalgic    Alignment: Overall    Radiology:     X-rays obtained and reviewed in office: None    The previous CT scan of the left lower extremity, 1/30/2018         FINDINGS: There is normally increased tracer activity throughout the liver, spleen and bone    marrow.  There are no areas of abnormal uptake.  Spot views of the bilateral knees demonstrate    no abnormal focal tracer accumulation.       COMMENT: Please note that this record was generated using voice recognition software.  If there    are any questions about the content of this document, please contact the radiologist at    [phone] as some errors in transcription may have occurred. IMPRESSION:   No abnormal tracer activity to suggest focal infection. Office Procedures:  No orders of the defined types were placed in this encounter. 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 Infection workup including cultures of the knee aspirate, sed rate, CRP and WBC imaging were all negative for infection so far. Hardware loosening cannot be ruled out 100% as a CT scan has not been performed as of yet. He did feel that this is less likely however given the stability on his exam and radiographs. Encounter Diagnoses   Name Primary?  Left knee pain, unspecified chronicity Yes    History of total knee arthroplasty, left        Treatment Plan: At this time we feel that majority of his symptoms are related to neuritis such as CRPS. We are recommending that he follow-up with a pain management physician for treatment for CRPS which may include sympathetic nerve blocks. All his questions were fully answered today. He was agreeable to this. We will go ahead and facilitate this for referral for him. We would like to see him back once he has met with them.     Follow up in: 6-8 weeks prn      9:44 AM      Sarah Jernigan, [id number] [facility] Sports Medicine Physician Assistant    During this examination, Mykel Giang PA-C, functioned as a scribe for Dr. Steve Vang. This dictation was performed with a verbal recognition program (DRAGON) and it was checked for errors. It is possible that there are still dictated errors within this office note. If so, please bring any errors to my attention for an addendum. All efforts were made to ensure that this office note is accurate. This dictation was performed with a verbal recognition program (DRAGON) and it was checked for errors. It is possible that there are still dictated errors within this office note. If so, please bring any errors to my attention for an addendum. All efforts were made to ensure that this office note is accurate. Supervising Physician Attestation:  I, Dr. Steve Vang, personally performed the services described in this documentation as scribed above, and it is both accurate and complete and I agree with all pertinent clinical information. I personally interviewed the patient and performed a physical examination and medical decision making. I discussed the patient's condition and treatment options and have  reviewed and agree with the past medical, family and social history unless otherwise noted. All of the patient's questions were answered.       Board Certified Orthopaedic Surgeon  [address] [facility] and [address] [place] [address]  [facility] and [address] and [employer]  Professor of [address]

## 2018-03-07 ENCOUNTER — TELEPHONE (OUTPATIENT)
Dept: ORTHOPEDIC SURGERY | Age: 63
End: 2018-03-07

## 2018-03-07 NOTE — TELEPHONE ENCOUNTER
Pt called did not leave reason he just asked if ale could return call, no emergency. Please call [phone]. Thanks!

## 2018-03-07 NOTE — TELEPHONE ENCOUNTER
Returned call and gave him the number for [facility] pain [facility] to call and set up his appointment.  The referral is in

## 2018-03-19 ENCOUNTER — TELEPHONE (OUTPATIENT)
Dept: INTERNAL MEDICINE CLINIC | Age: 63
End: 2018-03-19

## 2018-03-19 PROBLEM — R00.2 HEART PALPITATIONS: Status: ACTIVE | Noted: 2018-03-19

## 2018-03-19 NOTE — TELEPHONE ENCOUNTER
Patient called stating he had the following symptoms    Heart palpitations x3-4 days  Fatigue  HA (off and on, only lasting 30-45 seconds at a time)  Shakes  Little/no appetite    Denies chest pain    Please advise

## 2018-03-20 ENCOUNTER — TELEPHONE (OUTPATIENT)
Dept: ORTHOPEDIC SURGERY | Age: 63
End: 2018-03-20

## 2018-03-20 PROBLEM — R06.09 DYSPNEA ON EXERTION: Status: ACTIVE | Noted: 2018-03-20

## 2018-04-06 ENCOUNTER — OFFICE VISIT (OUTPATIENT)
Dept: INTERNAL MEDICINE CLINIC | Age: 63
End: 2018-04-06

## 2018-04-06 VITALS
DIASTOLIC BLOOD PRESSURE: 83 MMHG | BODY MASS INDEX: 31.63 KG/M2 | SYSTOLIC BLOOD PRESSURE: 132 MMHG | WEIGHT: 196.8 LBS | HEART RATE: 96 BPM | OXYGEN SATURATION: 96 %

## 2018-04-06 DIAGNOSIS — R06.09 DYSPNEA ON EXERTION: ICD-10-CM

## 2018-04-06 DIAGNOSIS — M25.562 CHRONIC PAIN OF LEFT KNEE: Primary | ICD-10-CM

## 2018-04-06 DIAGNOSIS — F17.210 CIGARETTE NICOTINE DEPENDENCE WITHOUT COMPLICATION: ICD-10-CM

## 2018-04-06 DIAGNOSIS — Z96.652 STATUS POST TOTAL LEFT KNEE REPLACEMENT: ICD-10-CM

## 2018-04-06 DIAGNOSIS — G89.29 CHRONIC PAIN OF LEFT KNEE: Primary | ICD-10-CM

## 2018-04-06 PROCEDURE — 99213 OFFICE O/P EST LOW 20 MIN: CPT | Performed by: INTERNAL MEDICINE

## 2018-04-09 ENCOUNTER — OFFICE VISIT (OUTPATIENT)
Dept: ORTHOPEDIC SURGERY | Age: 63
End: 2018-04-09

## 2018-04-09 VITALS — HEIGHT: 66 IN | BODY MASS INDEX: 31.64 KG/M2 | WEIGHT: 196.87 LBS

## 2018-04-09 DIAGNOSIS — M25.562 LEFT KNEE PAIN, UNSPECIFIED CHRONICITY: Primary | ICD-10-CM

## 2018-04-09 PROCEDURE — 99214 OFFICE O/P EST MOD 30 MIN: CPT | Performed by: ORTHOPAEDIC SURGERY

## 2018-04-16 ENCOUNTER — TELEPHONE (OUTPATIENT)
Dept: INTERNAL MEDICINE CLINIC | Age: 63
End: 2018-04-16

## 2018-04-16 DIAGNOSIS — G89.29 CHRONIC PAIN OF LEFT KNEE: Primary | ICD-10-CM

## 2018-04-16 DIAGNOSIS — M25.562 CHRONIC PAIN OF LEFT KNEE: Primary | ICD-10-CM

## 2018-04-18 ENCOUNTER — OFFICE VISIT (OUTPATIENT)
Dept: ORTHOPEDIC SURGERY | Age: 63
End: 2018-04-18

## 2018-04-18 VITALS
SYSTOLIC BLOOD PRESSURE: 121 MMHG | HEIGHT: 66 IN | WEIGHT: 196.87 LBS | BODY MASS INDEX: 31.64 KG/M2 | DIASTOLIC BLOOD PRESSURE: 77 MMHG | HEART RATE: 77 BPM

## 2018-04-18 DIAGNOSIS — Z96.652 STATUS POST TOTAL LEFT KNEE REPLACEMENT: ICD-10-CM

## 2018-04-18 DIAGNOSIS — M25.562 PAIN IN JOINT OF LEFT KNEE: Primary | ICD-10-CM

## 2018-04-18 PROCEDURE — 99213 OFFICE O/P EST LOW 20 MIN: CPT | Performed by: ORTHOPAEDIC SURGERY

## 2018-04-18 RX ORDER — HYDROCODONE BITARTRATE AND ACETAMINOPHEN 5; 325 MG/1; MG/1
1 TABLET ORAL EVERY 6 HOURS PRN
OUTPATIENT
Start: 2018-04-18

## 2018-04-18 RX ORDER — GABAPENTIN 300 MG/1
300 CAPSULE ORAL 3 TIMES DAILY PRN
Qty: 90 CAPSULE | Refills: 2 | Status: SHIPPED | OUTPATIENT
Start: 2018-04-18 | End: 2018-07-23 | Stop reason: ALTCHOICE

## 2018-04-18 RX ORDER — METHYLPREDNISOLONE 4 MG/1
TABLET ORAL
Qty: 1 KIT | Refills: 0 | Status: SHIPPED | OUTPATIENT
Start: 2018-04-18 | End: 2018-04-24

## 2018-04-19 RX ORDER — HYDROCODONE BITARTRATE AND ACETAMINOPHEN 5; 325 MG/1; MG/1
1 TABLET ORAL EVERY 8 HOURS PRN
Qty: 90 TABLET | Refills: 0 | Status: SHIPPED | OUTPATIENT
Start: 2018-04-19 | End: 2018-07-12 | Stop reason: ALTCHOICE

## 2018-05-15 ENCOUNTER — PAT TELEPHONE (OUTPATIENT)
Dept: PREADMISSION TESTING | Age: 63
End: 2018-05-15

## 2018-05-17 ENCOUNTER — HOSPITAL ENCOUNTER (OUTPATIENT)
Dept: PAIN MANAGEMENT | Age: 63
Discharge: HOME | End: 2018-05-17
Attending: PAIN MEDICINE | Admitting: PAIN MEDICINE

## 2018-05-17 VITALS
HEART RATE: 65 BPM | HEIGHT: 67 IN | RESPIRATION RATE: 16 BRPM | WEIGHT: 185 LBS | BODY MASS INDEX: 29.03 KG/M2 | OXYGEN SATURATION: 98 % | DIASTOLIC BLOOD PRESSURE: 69 MMHG | TEMPERATURE: 97.8 F | SYSTOLIC BLOOD PRESSURE: 129 MMHG

## 2018-05-17 PROCEDURE — 64450 NJX AA&/STRD OTHER PN/BRANCH: CPT | Performed by: PAIN MEDICINE

## 2018-05-17 PROCEDURE — 77003 FLUOROGUIDE FOR SPINE INJECT: CPT | Performed by: PAIN MEDICINE

## 2018-05-17 ASSESSMENT — PAIN - FUNCTIONAL ASSESSMENT: PAIN_FUNCTIONAL_ASSESSMENT: 0-10

## 2018-05-17 ASSESSMENT — ACTIVITIES OF DAILY LIVING (ADL): EFFECT OF PAIN ON DAILY ACTIVITIES: ANY ACTIVITY

## 2018-06-04 ENCOUNTER — TELEPHONE (OUTPATIENT)
Dept: ORTHOPEDIC SURGERY | Age: 63
End: 2018-06-04

## 2018-06-07 ENCOUNTER — HOSPITAL ENCOUNTER (OUTPATIENT)
Dept: SURGERY | Age: 63
Discharge: HOME | End: 2018-06-07
Attending: PAIN MEDICINE | Admitting: PAIN MEDICINE

## 2018-06-07 VITALS
SYSTOLIC BLOOD PRESSURE: 122 MMHG | OXYGEN SATURATION: 98 % | TEMPERATURE: 98.2 F | HEIGHT: 67 IN | DIASTOLIC BLOOD PRESSURE: 80 MMHG | HEART RATE: 80 BPM | WEIGHT: 190 LBS | RESPIRATION RATE: 14 BRPM | BODY MASS INDEX: 29.82 KG/M2

## 2018-06-07 PROCEDURE — 64520 N BLOCK LUMBAR/THORACIC: CPT | Performed by: PAIN MEDICINE

## 2018-06-07 RX ORDER — SODIUM CHLORIDE, SODIUM LACTATE, POTASSIUM CHLORIDE, CALCIUM CHLORIDE 600; 310; 30; 20 MG/100ML; MG/100ML; MG/100ML; MG/100ML
INJECTION, SOLUTION INTRAVENOUS
Status: COMPLETED
Start: 2018-06-07 | End: 2018-06-07

## 2018-06-07 RX ORDER — FENTANYL CITRATE 50 UG/ML
INJECTION, SOLUTION INTRAMUSCULAR; INTRAVENOUS
Status: DISCONTINUED
Start: 2018-06-07 | End: 2018-06-08 | Stop reason: HOSPADM

## 2018-06-07 RX ORDER — SODIUM CHLORIDE, SODIUM LACTATE, POTASSIUM CHLORIDE, CALCIUM CHLORIDE 600; 310; 30; 20 MG/100ML; MG/100ML; MG/100ML; MG/100ML
INJECTION, SOLUTION INTRAVENOUS CONTINUOUS
Status: DISCONTINUED | OUTPATIENT
Start: 2018-06-07 | End: 2018-06-08 | Stop reason: HOSPADM

## 2018-06-07 RX ORDER — LIDOCAINE HYDROCHLORIDE 10 MG/ML
INJECTION, SOLUTION EPIDURAL; INFILTRATION; INTRACAUDAL; PERINEURAL
Status: COMPLETED
Start: 2018-06-07 | End: 2018-06-07

## 2018-06-07 RX ORDER — MIDAZOLAM HYDROCHLORIDE 1 MG/ML
INJECTION INTRAMUSCULAR; INTRAVENOUS
Status: DISCONTINUED
Start: 2018-06-07 | End: 2018-06-08 | Stop reason: HOSPADM

## 2018-06-07 RX ADMIN — SODIUM CHLORIDE, SODIUM LACTATE, POTASSIUM CHLORIDE, CALCIUM CHLORIDE: 600; 310; 30; 20 INJECTION, SOLUTION INTRAVENOUS at 14:38

## 2018-06-07 RX ADMIN — LIDOCAINE HYDROCHLORIDE 0.1 ML: 10 INJECTION, SOLUTION EPIDURAL; INFILTRATION; INTRACAUDAL; PERINEURAL at 14:26

## 2018-06-07 ASSESSMENT — ACTIVITIES OF DAILY LIVING (ADL): EFFECT OF PAIN ON DAILY ACTIVITIES: ANY ACTIVITY

## 2018-06-07 ASSESSMENT — PAIN - FUNCTIONAL ASSESSMENT
PAIN_FUNCTIONAL_ASSESSMENT: 0-10
PAIN_FUNCTIONAL_ASSESSMENT: 0-10

## 2018-07-23 ENCOUNTER — HOSPITAL ENCOUNTER (EMERGENCY)
Age: 63
Discharge: HOME | End: 2018-07-23
Attending: EMERGENCY MEDICINE
Payer: COMMERCIAL

## 2018-07-23 ENCOUNTER — APPOINTMENT (OUTPATIENT)
Dept: CT IMAGING | Age: 63
End: 2018-07-23
Payer: COMMERCIAL

## 2018-07-23 VITALS
HEART RATE: 63 BPM | DIASTOLIC BLOOD PRESSURE: 79 MMHG | OXYGEN SATURATION: 97 % | SYSTOLIC BLOOD PRESSURE: 133 MMHG | HEIGHT: 67 IN | BODY MASS INDEX: 30.61 KG/M2 | TEMPERATURE: 97.8 F | WEIGHT: 195 LBS | RESPIRATION RATE: 15 BRPM

## 2018-07-23 DIAGNOSIS — N39.0 URINARY TRACT INFECTION WITHOUT HEMATURIA, SITE UNSPECIFIED: Primary | ICD-10-CM

## 2018-07-23 DIAGNOSIS — R33.9 URINARY RETENTION: ICD-10-CM

## 2018-07-23 LAB
A/G RATIO: 1.8 (ref 1.1–2.2)
ALBUMIN SERPL-MCNC: 4.3 G/DL (ref 3.4–5)
ALP BLD-CCNC: 46 U/L (ref 40–129)
ALT SERPL-CCNC: 29 U/L (ref 10–40)
AMORPHOUS: ABNORMAL /HPF
ANION GAP SERPL CALCULATED.3IONS-SCNC: 12 MMOL/L (ref 3–16)
AST SERPL-CCNC: 16 U/L (ref 15–37)
BACTERIA: ABNORMAL /HPF
BASOPHILS ABSOLUTE: 0.1 K/UL (ref 0–0.2)
BASOPHILS RELATIVE PERCENT: 1 %
BILIRUB SERPL-MCNC: 0.5 MG/DL (ref 0–1)
BILIRUBIN URINE: NEGATIVE
BLOOD, URINE: NEGATIVE
BUN BLDV-MCNC: 12 MG/DL (ref 7–20)
CALCIUM SERPL-MCNC: 9 MG/DL (ref 8.3–10.6)
CHLORIDE BLD-SCNC: 104 MMOL/L (ref 99–110)
CLARITY: ABNORMAL
CO2: 25 MMOL/L (ref 21–32)
COLOR: YELLOW
CREAT SERPL-MCNC: 0.7 MG/DL (ref 0.8–1.3)
EKG ATRIAL RATE: 77 BPM
EKG DIAGNOSIS: NORMAL
EKG P AXIS: 55 DEGREES
EKG P-R INTERVAL: 136 MS
EKG Q-T INTERVAL: 366 MS
EKG QRS DURATION: 88 MS
EKG QTC CALCULATION (BAZETT): 414 MS
EKG R AXIS: 35 DEGREES
EKG T AXIS: 4 DEGREES
EKG VENTRICULAR RATE: 77 BPM
EOSINOPHILS ABSOLUTE: 0.3 K/UL (ref 0–0.6)
EOSINOPHILS RELATIVE PERCENT: 4.2 %
GFR AFRICAN AMERICAN: >60
GFR NON-AFRICAN AMERICAN: >60
GLOBULIN: 2.4 G/DL
GLUCOSE BLD-MCNC: 123 MG/DL (ref 70–99)
GLUCOSE URINE: NEGATIVE MG/DL
HCT VFR BLD CALC: 47.4 % (ref 40.5–52.5)
HEMOGLOBIN: 16.3 G/DL (ref 13.5–17.5)
KETONES, URINE: NEGATIVE MG/DL
LACTIC ACID: 2.2 MMOL/L (ref 0.4–2)
LEUKOCYTE ESTERASE, URINE: ABNORMAL
LIPASE: 14 U/L (ref 13–60)
LYMPHOCYTES ABSOLUTE: 2.8 K/UL (ref 1–5.1)
LYMPHOCYTES RELATIVE PERCENT: 36.1 %
MCH RBC QN AUTO: 32.5 PG (ref 26–34)
MCHC RBC AUTO-ENTMCNC: 34.4 G/DL (ref 31–36)
MCV RBC AUTO: 94.7 FL (ref 80–100)
MICROSCOPIC EXAMINATION: YES
MONOCYTES ABSOLUTE: 0.7 K/UL (ref 0–1.3)
MONOCYTES RELATIVE PERCENT: 9.3 %
NEUTROPHILS ABSOLUTE: 3.8 K/UL (ref 1.7–7.7)
NEUTROPHILS RELATIVE PERCENT: 49.4 %
NITRITE, URINE: NEGATIVE
PDW BLD-RTO: 13.3 % (ref 12.4–15.4)
PH UA: 6
PLATELET # BLD: 238 K/UL (ref 135–450)
PMV BLD AUTO: 8.7 FL (ref 5–10.5)
POTASSIUM SERPL-SCNC: 4 MMOL/L (ref 3.5–5.1)
PRO-BNP: 38 PG/ML (ref 0–124)
PROTEIN UA: NEGATIVE MG/DL
RBC # BLD: 5.01 M/UL (ref 4.2–5.9)
RBC UA: ABNORMAL /HPF (ref 0–2)
SODIUM BLD-SCNC: 141 MMOL/L (ref 136–145)
SPECIFIC GRAVITY UA: 1.02
TOTAL PROTEIN: 6.7 G/DL (ref 6.4–8.2)
TROPONIN: <0.01 NG/ML
URINE TYPE: ABNORMAL
UROBILINOGEN, URINE: 0.2 E.U./DL
WBC # BLD: 7.7 K/UL (ref 4–11)
WBC UA: ABNORMAL /HPF (ref 0–5)

## 2018-07-23 PROCEDURE — 81001 URINALYSIS AUTO W/SCOPE: CPT

## 2018-07-23 PROCEDURE — 87086 URINE CULTURE/COLONY COUNT: CPT

## 2018-07-23 PROCEDURE — 84484 ASSAY OF TROPONIN QUANT: CPT

## 2018-07-23 PROCEDURE — 85025 COMPLETE CBC W/AUTO DIFF WBC: CPT

## 2018-07-23 PROCEDURE — 2580000003 HC RX 258: Performed by: EMERGENCY MEDICINE

## 2018-07-23 PROCEDURE — 83605 ASSAY OF LACTIC ACID: CPT

## 2018-07-23 PROCEDURE — 93010 ELECTROCARDIOGRAM REPORT: CPT | Performed by: INTERNAL MEDICINE

## 2018-07-23 PROCEDURE — 96365 THER/PROPH/DIAG IV INF INIT: CPT

## 2018-07-23 PROCEDURE — 36415 COLL VENOUS BLD VENIPUNCTURE: CPT

## 2018-07-23 PROCEDURE — 74177 CT ABD & PELVIS W/CONTRAST: CPT

## 2018-07-23 PROCEDURE — 6360000002 HC RX W HCPCS: Performed by: EMERGENCY MEDICINE

## 2018-07-23 PROCEDURE — 96361 HYDRATE IV INFUSION ADD-ON: CPT

## 2018-07-23 PROCEDURE — 6360000004 HC RX CONTRAST MEDICATION: Performed by: EMERGENCY MEDICINE

## 2018-07-23 PROCEDURE — 93005 ELECTROCARDIOGRAM TRACING: CPT | Performed by: EMERGENCY MEDICINE

## 2018-07-23 PROCEDURE — 6370000000 HC RX 637 (ALT 250 FOR IP): Performed by: EMERGENCY MEDICINE

## 2018-07-23 PROCEDURE — 51702 INSERT TEMP BLADDER CATH: CPT

## 2018-07-23 PROCEDURE — 83880 ASSAY OF NATRIURETIC PEPTIDE: CPT

## 2018-07-23 PROCEDURE — 83690 ASSAY OF LIPASE: CPT

## 2018-07-23 PROCEDURE — 96375 TX/PRO/DX INJ NEW DRUG ADDON: CPT

## 2018-07-23 PROCEDURE — 80053 COMPREHEN METABOLIC PANEL: CPT

## 2018-07-23 PROCEDURE — 99284 EMERGENCY DEPT VISIT MOD MDM: CPT

## 2018-07-23 RX ORDER — 0.9 % SODIUM CHLORIDE 0.9 %
1000 INTRAVENOUS SOLUTION INTRAVENOUS ONCE
Status: COMPLETED | OUTPATIENT
Start: 2018-07-23 | End: 2018-07-23

## 2018-07-23 RX ORDER — TAMSULOSIN HYDROCHLORIDE 0.4 MG/1
0.4 CAPSULE ORAL DAILY
COMMUNITY
Start: 2018-07-17 | End: 2019-04-16

## 2018-07-23 RX ORDER — OXYCODONE HYDROCHLORIDE 5 MG/1
5 TABLET ORAL ONCE
Status: COMPLETED | OUTPATIENT
Start: 2018-07-23 | End: 2018-07-23

## 2018-07-23 RX ORDER — CIPROFLOXACIN 500 MG/1
500 TABLET, FILM COATED ORAL 2 TIMES DAILY
Qty: 14 TABLET | Refills: 0 | Status: SHIPPED | OUTPATIENT
Start: 2018-07-23 | End: 2018-07-30

## 2018-07-23 RX ORDER — ONDANSETRON 2 MG/ML
4 INJECTION INTRAMUSCULAR; INTRAVENOUS ONCE
Status: COMPLETED | OUTPATIENT
Start: 2018-07-23 | End: 2018-07-23

## 2018-07-23 RX ADMIN — IOPAMIDOL 100 ML: 755 INJECTION, SOLUTION INTRAVENOUS at 09:58

## 2018-07-23 RX ADMIN — OXYCODONE HYDROCHLORIDE 5 MG: 5 TABLET ORAL at 11:51

## 2018-07-23 RX ADMIN — Medication 0.5 MG: at 08:24

## 2018-07-23 RX ADMIN — SODIUM CHLORIDE 1000 ML: 9 INJECTION, SOLUTION INTRAVENOUS at 09:07

## 2018-07-23 RX ADMIN — CEFTRIAXONE SODIUM 1 G: 1 INJECTION, POWDER, FOR SOLUTION INTRAMUSCULAR; INTRAVENOUS at 11:31

## 2018-07-23 RX ADMIN — ONDANSETRON 4 MG: 2 INJECTION INTRAMUSCULAR; INTRAVENOUS at 08:24

## 2018-07-23 RX ADMIN — Medication 0.5 MG: at 08:43

## 2018-07-23 ASSESSMENT — PAIN DESCRIPTION - PAIN TYPE
TYPE: ACUTE PAIN
TYPE: ACUTE PAIN

## 2018-07-23 ASSESSMENT — PAIN SCALES - GENERAL
PAINLEVEL_OUTOF10: 3
PAINLEVEL_OUTOF10: 3
PAINLEVEL_OUTOF10: 4
PAINLEVEL_OUTOF10: 6
PAINLEVEL_OUTOF10: 1

## 2018-07-23 ASSESSMENT — PAIN DESCRIPTION - LOCATION: LOCATION: BACK;FLANK

## 2018-07-23 ASSESSMENT — PAIN DESCRIPTION - ORIENTATION: ORIENTATION: LOWER;RIGHT;LEFT

## 2018-07-23 NOTE — ED PROVIDER NOTES
[facility] Emergency Department    CHIEF COMPLAINT  Back Pain (Lower back pain. Patient was seen here a week ago Thursday. Told his bladder was distended, has been urinating, but not much. Had appt today with urology.  ); Flank Pain (bilateral flank pain); and Chest Pain (Just started a a little bit ago, sharp left sided chest pains)      HISTORY OF PRESENT ILLNESS  Louis Morrow is a 70 y.o. male  who presents to the ED with pain in his flank area and difficulty urinating. Patient denies any fever or vomiting. She has an appointment liter today with the urology group. He came in because of severe pain and difficulty urinating. Earlier today he did have a brief episode of chest pain which he says he thinks is related to his anxiety. He has had episodes of chest pain which he describes as a tightness when he gets anxious. He is denying any chest pain or tightness at this time. Patient denies any weakness or numbness in his legs. He denies any history of IV drug abuse or fever. No other complaints, modifying factors or associated symptoms. I have reviewed the following from the nursing documentation.     Past Medical History:   Diagnosis Date    Arthritis     knee    Bladder distention     Patient sees Dr. Rosy Cortez Hyperlipidemia     Knee joint dislocation     left    Urinary incontinence      Past Surgical History:   Procedure Laterality Date    CHOLECYSTECTOMY      KNEE ARTHROSCOPY Left 03/03/2017     LEFT KNEE ARTHROSCOPY, SYNOVECTOMY      OTHER SURGICAL HISTORY Left 06/06/2017    LEFT TOTAL KNEE ARTHROPLASTY     TOTAL KNEE ARTHROPLASTY Left 2017     Family History   Problem Relation Age of Onset    Heart Disease Mother         defibrillator    Thyroid Disease Father         PPM    Arthritis Sister     Alcohol Abuse Brother         arthritis    Other Sister         Typhoid fever complications    Other Sister         obesity     Social History     Social History    0.0 - 0.2 K/uL   Comprehensive metabolic panel   Result Value Ref Range    Sodium 141 136 - 145 mmol/L    Potassium 4.0 3.5 - 5.1 mmol/L    Chloride 104 99 - 110 mmol/L    CO2 25 21 - 32 mmol/L    Anion Gap 12 3 - 16    Glucose 123 (H) 70 - 99 mg/dL    BUN 12 7 - 20 mg/dL    CREATININE 0.7 (L) 0.8 - 1.3 mg/dL    GFR Non-African American >60 >60    GFR African American >60 >60    Calcium 9.0 8.3 - 10.6 mg/dL    Total Protein 6.7 6.4 - 8.2 g/dL    Alb 4.3 3.4 - 5.0 g/dL    Albumin/Globulin Ratio 1.8 1.1 - 2.2    Total Bilirubin 0.5 0.0 - 1.0 mg/dL    Alkaline Phosphatase 46 40 - 129 U/L    ALT 29 10 - 40 U/L    AST 16 15 - 37 U/L    Globulin 2.4 g/dL   Troponin   Result Value Ref Range    Troponin <0.01 <0.01 ng/mL   Lactic acid, plasma   Result Value Ref Range    Lactic Acid 2.2 (H) 0.4 - 2.0 mmol/L   Lipase   Result Value Ref Range    Lipase 14.0 13.0 - 60.0 U/L   Brain Natriuretic Peptide   Result Value Ref Range    Pro-BNP 38 0 - 124 pg/mL   Microscopic Urinalysis   Result Value Ref Range    WBC, UA None seen 0 - 5 /HPF    RBC, UA None seen 0 - 2 /HPF    Bacteria, UA 1+ (A) /HPF    Amorphous, UA 1+ (A) /HPF   EKG 12 Lead   Result Value Ref Range    Ventricular Rate 77 BPM    Atrial Rate 77 BPM    P-R Interval 136 ms    QRS Duration 88 ms    Q-T Interval 366 ms    QTc Calculation (Bazett) 414 ms    P Axis 55 degrees    R Axis 35 degrees    T Axis 4 degrees    Diagnosis       Normal sinus rhythmNormal ECGWhen compared with ECG of 21-MAR-2018 19:57,No significant change was foundConfirmed by [facility] KIAN RICHEY, Roberto Strickland ([id number]) on 7/23/2018 5:30:33 PM       The Ekg interpreted by me shows  normal sinus rhythm with a rate of 77  Axis is   Normal  QTc is  normal  Intervals and Durations are unremarkable.       ST Segments: no acute change          RADIOLOGY    Ct Abdomen Pelvis W Iv Contrast Additional Contrast? None    Result Date: 7/12/2018  EXAMINATION: CT OF THE ABDOMEN AND PELVIS WITH CONTRAST 7/12/2018 7:12 pm TECHNIQUE: CT abdominal aorta is normal in caliber. Superior mesenteric artery is enhancing. No upper abdominal, retroperitoneal, iliac chain, or inguinal lymphadenopathy. Bones/Soft Tissues: No osteolytic or osteoblastic bone lesions are present. No acute bony abnormality is identified. Mild diverticulosis, without definite CT evidence of diverticulitis. Otherwise, no acute abnormalities identified to explain the patient's abdominal pain. ED COURSE/MDM  Patient seen and evaluated. Old records reviewed. Labs and imaging reviewed and results discussed with patient. 52-year-old male presenting with some difficulty urinating and we did place a Tuttle catheter and patient did have some relief. He may have evidence of urinary tract infection and was put on antibiotics. He is fortunate to have an appointment with urology later today. Patient was told to keep his appointment and Tuttle catheter was left in. Patient is able to her a. There is no evidence of cauda equina syndrome or concern for epidural abscess at this time although this was considered. His pain seems to be more related to his kidneys. He was however given referral to a spine specialist to have further evaluation including MRI of the lumbar spine if pain persists. There is no evidence of acute ischemia seen on EKG and troponin is negative. CLINICAL IMPRESSION  1. Urinary tract infection without hematuria, site unspecified    2. Urinary retention        Blood pressure 133/79, pulse 63, temperature 97.8 °F (36.6 °C), temperature source Oral, resp. rate 15, height 5' 7\" (1.702 m), weight 195 lb (88.5 kg), SpO2 97 %. Sherwin Adan was discharged to home in stable condition.          Deloris Young DO  07/25/18 2703

## 2018-07-23 NOTE — ED NOTES
Urinary catheter switched to leg bag per MD. Education provided. Patient quiet, calm and cooperative.       Ravi Rendon RN  07/23/18 [id number]

## 2018-07-24 LAB — URINE CULTURE, ROUTINE: NORMAL

## 2018-08-03 ENCOUNTER — OFFICE VISIT (OUTPATIENT)
Dept: INTERNAL MEDICINE CLINIC | Age: 63
End: 2018-08-03

## 2018-08-03 VITALS — HEART RATE: 85 BPM | OXYGEN SATURATION: 94 % | DIASTOLIC BLOOD PRESSURE: 68 MMHG | SYSTOLIC BLOOD PRESSURE: 130 MMHG

## 2018-08-03 DIAGNOSIS — Z01.818 PRE-OP EXAM: Primary | ICD-10-CM

## 2018-08-03 DIAGNOSIS — G89.29 CHRONIC PAIN OF LEFT KNEE: ICD-10-CM

## 2018-08-03 DIAGNOSIS — M25.562 CHRONIC PAIN OF LEFT KNEE: ICD-10-CM

## 2018-08-03 DIAGNOSIS — M54.50 LOW BACK PAIN WITH RADIATION: ICD-10-CM

## 2018-08-03 DIAGNOSIS — R33.9 URINARY RETENTION: ICD-10-CM

## 2018-08-03 PROCEDURE — 99212 OFFICE O/P EST SF 10 MIN: CPT | Performed by: INTERNAL MEDICINE

## 2018-08-03 PROCEDURE — 93000 ELECTROCARDIOGRAM COMPLETE: CPT | Performed by: INTERNAL MEDICINE

## 2018-08-03 RX ORDER — HYDROCODONE BITARTRATE AND ACETAMINOPHEN 5; 325 MG/1; MG/1
1 TABLET ORAL EVERY 6 HOURS PRN
COMMUNITY
End: 2018-10-19

## 2018-08-03 RX ORDER — GABAPENTIN 100 MG/1
100 CAPSULE ORAL 3 TIMES DAILY PRN
COMMUNITY
End: 2018-08-15

## 2018-08-03 NOTE — PATIENT INSTRUCTIONS
Patient Education        Learning About Opioids  Introduction    Opioids are medicines used to relieve moderate to severe pain. They may be used for a short time for pain, such as after surgery. Or in some cases a doctor might prescribe them for long-term pain. They don't cure a health problem. But they help you manage the pain. Opioids relieve pain by changing the way your body feels pain and the way you feel about pain. Sometimes opioids are used for people who can't take other pain medicines. They may be prescribed if you have heart, kidney, or liver problems. For instance, you may take an opioid instead of nonsteroidal anti-inflammatory drugs (NSAIDs). NSAIDs include ibuprofen (Advil, Motrin) and naproxen (Aleve). Opioids are strong medicines. They can help you manage pain when you use them the right way. But if you misuse them, they can cause serious harm and even death. If you decide to take opioids, here are some things to remember. · Keep your doctor informed. You can get addicted to opioids. The risk is higher if you have a history of substance use. Your doctor will monitor you closely for signs of misuse and addiction and to figure out when you no longer need to take opioids. · Make a treatment plan. The goal of your plan is to be able to function and do the things you need to do, even if you still have some pain. You might be able to manage your pain with other non-opioid options like physical therapy, relaxation, or over-the-counter pain medicines. · Be aware of the side effects. Opioids can cause serious side effects, such as constipation, dry mouth, and nausea. And over time, you may need a higher dose to get pain relief. This is called tolerance. Your body also gets used to opioids. This is called physical dependence. If you suddenly stop taking them, you may have withdrawal symptoms. Examples  Opioids or other medicines that contain them include:  · Codeine (Tylenol 3).   · Hydrocodone (Rachid Backer). · Oxycodone (OxyContin, Percocet). Safety tips  If you need to take opioids to manage your pain, remember these safety tips. · Follow directions carefully. It's easy to misuse opioids if you take a dose other than what's prescribed by your doctor. This can lead to overdose and even death. Even sharing them with someone they weren't meant for is misuse. · Be cautious. Opioids may affect your judgment and decision making. Do not drive or operate machinery until you can think clearly. Talk with your doctor about when it is safe to drive. · Reduce the risk of drug interactions. Opioids can be dangerous if you take them with alcohol or with certain drugs like sleeping pills and muscle relaxers. Make sure your doctor knows about all the other medicines you take, including over-the-counter medicines. Don't start any new medicines before you talk to your doctor or pharmacist.  · Keep others safe. Store opioids in a safe and secure place. Make sure that pets, children, friends, and family can't get to them. When you're done using opioids, make sure to properly dispose of them. You can either use a community drug take-back program or your drugstore's mail-back program. If one of these programs isn't available, you can flush opioid skin patches and unused opioid pills down the toilet. · Reduce the risk of overdose. Misuse of opioids can be very dangerous. Protect yourself by asking your doctor about a naloxone rescue kit. It can help you-and even save your life-if you take too much of an opioid. Side effects  Common side effects include:  · Constipation. · Feeling dizzy or lightheaded. You may feel like you might faint. · Feeling sleepy. · Nausea or vomiting. You may have other side effects or reactions. Check the information that comes with your medicine. When should you call for help? Call 911 anytime you think you may need emergency care.  For example, call if:  · You have symptoms of a severe allergic

## 2018-08-06 ENCOUNTER — TELEPHONE (OUTPATIENT)
Dept: ORTHOPEDIC SURGERY | Age: 63
End: 2018-08-06

## 2018-08-15 ENCOUNTER — HOSPITAL ENCOUNTER (OUTPATIENT)
Age: 63
Setting detail: OBSERVATION
Discharge: HOME | End: 2018-08-16
Attending: UROLOGY | Admitting: UROLOGY
Payer: MEDICARE

## 2018-08-15 DIAGNOSIS — R31.0 GROSS HEMATURIA: Primary | ICD-10-CM

## 2018-08-15 DIAGNOSIS — Z90.79 S/P TURP: ICD-10-CM

## 2018-08-15 PROCEDURE — 6360000002 HC RX W HCPCS: Performed by: PHYSICIAN ASSISTANT

## 2018-08-15 PROCEDURE — 2580000003 HC RX 258: Performed by: PHYSICIAN ASSISTANT

## 2018-08-15 PROCEDURE — 6370000000 HC RX 637 (ALT 250 FOR IP): Performed by: PHYSICIAN ASSISTANT

## 2018-08-15 PROCEDURE — G0378 HOSPITAL OBSERVATION PER HR: HCPCS

## 2018-08-15 PROCEDURE — 96375 TX/PRO/DX INJ NEW DRUG ADDON: CPT

## 2018-08-15 PROCEDURE — 99284 EMERGENCY DEPT VISIT MOD MDM: CPT

## 2018-08-15 PROCEDURE — 2500000003 HC RX 250 WO HCPCS: Performed by: PHYSICIAN ASSISTANT

## 2018-08-15 PROCEDURE — 96374 THER/PROPH/DIAG INJ IV PUSH: CPT

## 2018-08-15 RX ORDER — SODIUM CHLORIDE 0.9 % (FLUSH) 0.9 %
10 SYRINGE (ML) INJECTION EVERY 12 HOURS SCHEDULED
Status: DISCONTINUED | OUTPATIENT
Start: 2018-08-15 | End: 2018-08-16 | Stop reason: HOSPADM

## 2018-08-15 RX ORDER — DEXTROSE, SODIUM CHLORIDE, AND POTASSIUM CHLORIDE 5; .45; .15 G/100ML; G/100ML; G/100ML
INJECTION INTRAVENOUS CONTINUOUS
Status: DISCONTINUED | OUTPATIENT
Start: 2018-08-15 | End: 2018-08-16

## 2018-08-15 RX ORDER — PANTOPRAZOLE SODIUM 40 MG/1
40 TABLET, DELAYED RELEASE ORAL
Status: DISCONTINUED | OUTPATIENT
Start: 2018-08-16 | End: 2018-08-16 | Stop reason: HOSPADM

## 2018-08-15 RX ORDER — GABAPENTIN 100 MG/1
100 CAPSULE ORAL 3 TIMES DAILY PRN
Status: DISCONTINUED | OUTPATIENT
Start: 2018-08-15 | End: 2018-08-16 | Stop reason: HOSPADM

## 2018-08-15 RX ORDER — ACETAMINOPHEN 325 MG/1
650 TABLET ORAL EVERY 4 HOURS PRN
Status: DISCONTINUED | OUTPATIENT
Start: 2018-08-15 | End: 2018-08-16 | Stop reason: HOSPADM

## 2018-08-15 RX ORDER — NICOTINE 21 MG/24HR
1 PATCH, TRANSDERMAL 24 HOURS TRANSDERMAL DAILY
Status: DISCONTINUED | OUTPATIENT
Start: 2018-08-15 | End: 2018-08-16 | Stop reason: HOSPADM

## 2018-08-15 RX ORDER — TAMSULOSIN HYDROCHLORIDE 0.4 MG/1
0.4 CAPSULE ORAL DAILY
Status: DISCONTINUED | OUTPATIENT
Start: 2018-08-15 | End: 2018-08-16 | Stop reason: HOSPADM

## 2018-08-15 RX ORDER — SODIUM CHLORIDE 0.9 % (FLUSH) 0.9 %
10 SYRINGE (ML) INJECTION PRN
Status: DISCONTINUED | OUTPATIENT
Start: 2018-08-15 | End: 2018-08-16 | Stop reason: HOSPADM

## 2018-08-15 RX ORDER — CEPHALEXIN 250 MG/1
500 CAPSULE ORAL EVERY 12 HOURS SCHEDULED
Status: DISCONTINUED | OUTPATIENT
Start: 2018-08-15 | End: 2018-08-16 | Stop reason: HOSPADM

## 2018-08-15 RX ORDER — HYDROCODONE BITARTRATE AND ACETAMINOPHEN 5; 325 MG/1; MG/1
2 TABLET ORAL EVERY 4 HOURS PRN
Status: DISCONTINUED | OUTPATIENT
Start: 2018-08-15 | End: 2018-08-16 | Stop reason: HOSPADM

## 2018-08-15 RX ORDER — HYDROCODONE BITARTRATE AND ACETAMINOPHEN 5; 325 MG/1; MG/1
1 TABLET ORAL EVERY 4 HOURS PRN
Status: DISCONTINUED | OUTPATIENT
Start: 2018-08-15 | End: 2018-08-16 | Stop reason: HOSPADM

## 2018-08-15 RX ORDER — ONDANSETRON 2 MG/ML
4 INJECTION INTRAMUSCULAR; INTRAVENOUS EVERY 6 HOURS PRN
Status: DISCONTINUED | OUTPATIENT
Start: 2018-08-15 | End: 2018-08-16 | Stop reason: HOSPADM

## 2018-08-15 RX ADMIN — SODIUM CHLORIDE, PRESERVATIVE FREE 10 ML: 5 INJECTION INTRAVENOUS at 23:06

## 2018-08-15 RX ADMIN — SODIUM CHLORIDE, PRESERVATIVE FREE 10 ML: 5 INJECTION INTRAVENOUS at 23:12

## 2018-08-15 RX ADMIN — HYDROMORPHONE HYDROCHLORIDE 0.5 MG: 1 INJECTION, SOLUTION INTRAMUSCULAR; INTRAVENOUS; SUBCUTANEOUS at 23:06

## 2018-08-15 RX ADMIN — ONDANSETRON 4 MG: 2 INJECTION INTRAMUSCULAR; INTRAVENOUS at 23:12

## 2018-08-15 RX ADMIN — POTASSIUM CHLORIDE, DEXTROSE MONOHYDRATE AND SODIUM CHLORIDE: 150; 5; 450 INJECTION, SOLUTION INTRAVENOUS at 17:54

## 2018-08-15 RX ADMIN — TAMSULOSIN HYDROCHLORIDE 0.4 MG: 0.4 CAPSULE ORAL at 17:52

## 2018-08-15 RX ADMIN — CEPHALEXIN 500 MG: 250 CAPSULE ORAL at 17:51

## 2018-08-15 ASSESSMENT — ENCOUNTER SYMPTOMS
SHORTNESS OF BREATH: 0
COLOR CHANGE: 0
NAUSEA: 1
ABDOMINAL PAIN: 1
EYES NEGATIVE: 1
VOMITING: 0

## 2018-08-15 ASSESSMENT — PAIN DESCRIPTION - LOCATION
LOCATION: GROIN;ABDOMEN
LOCATION: KNEE
LOCATION: GROIN;ABDOMEN

## 2018-08-15 ASSESSMENT — PAIN DESCRIPTION - PAIN TYPE
TYPE: ACUTE PAIN
TYPE: SURGICAL PAIN
TYPE: SURGICAL PAIN

## 2018-08-15 ASSESSMENT — PAIN DESCRIPTION - ORIENTATION
ORIENTATION: LOWER
ORIENTATION: LOWER

## 2018-08-15 ASSESSMENT — PAIN SCALES - GENERAL
PAINLEVEL_OUTOF10: 8

## 2018-08-15 NOTE — ED PROVIDER NOTES
[facility]  ED  eMERGENCY dEPARTMENT eNCOUnter        Pt Name: Love Multani  MRN: [id number]  [place] 1955  Date of evaluation: 8/15/2018  Provider: Zohreh Shaffer PA-C  PCP: Leti Parisi DO  ED Attending: Christiana Hogan    History provided by the patient    CHIEF COMPLAINT:     Chief Complaint   Patient presents with    Hematuria     pt was sent from the urology group for admission for hematuria       HISTORY OF PRESENT ILLNESS:      Love Multani is a 70 y.o. male who arrives to the ED by EMS from the urology surgery center in [place] [address]. The patient underwent TURP procedure by Dr. Mal Cedeño. He had been experiencing urinary retention and had to be cathed multiple times over the course of the 2 weeks preceding his cystoscopy with transurethral resection of the prostate today. Postoperatively today, the patient had ongoing hematuria. He had ongoing, uncontrolled pain. He had a three-way Tuttle catheter placed with irrigation, but continued with gross hematuria and reported feeling generally weak and slightly nauseous. Ultimately, it was felt the patient would benefit from hospitalization. The patient could not be directly admitted secondary to a lack of inpatient beds and therefore he was sent to the ED. However, orders for admission were placed by the urology group. Nursing Notes were reviewed     REVIEW OF SYSTEMS:     Review of Systems   Constitutional: Negative for fever. HENT: Negative. Eyes: Negative. Respiratory: Negative for shortness of breath. Cardiovascular: Negative for chest pain. Gastrointestinal: Positive for abdominal pain and nausea. Negative for vomiting. Genitourinary: Positive for hematuria. Musculoskeletal: Negative for joint swelling and neck pain. Skin: Negative for color change. Neurological: Positive for weakness (generalized). Negative for headaches. All other systems reviewed and are negative.       Except as noted above in the ROS, all other 16 96 % 5' 7\" (1.702 m) 200 lb (90.7 kg)       Physical Exam    CONSTITUTIONAL: Awake and alert. Cooperative. Well-developed. Well-nourished. Non-toxic. No acute distress. HENT: Normocephalic. Atraumatic. External ears normal, without discharge. No nasal discharge. Oropharynx clear. Mucous membranes moist.  EYES: Conjunctiva non-injected. No scleral icterus. PERRL. EOM's grossly intact. NECK: Supple. Normal ROM. CARDIOVASCULAR: RRR. No Murmer. Intact distal pulses. PULMONARY/CHEST WALL: Effort normal. No tachypnea. Lungs clear to ausculation. ABDOMEN: Soft. Nondistended. No tenderness to palpate. No guarding. /ANORECTAL: Three-way Tuttle catheter in place. Tuttle bag with gross hematuria within  MUSKULOSKELETAL: Normal ROM. No acute deformities. No edema. No tenderness to palpate. SKIN: Warm and dry. NEUROLOGICAL: Alert and oriented x 3. GCS 15. CN II-XII grossly intact. Strength is 5/5 in all extremities and sensation is intact. PSYCHIATRIC: Normal affect        DIAGNOSTIC RESULTS:     LABS:    None    RADIOLOGY:    None      PROCEDURES:   N/A    CRITICAL CARE TIME:   N/A    CONSULTS:  None      EMERGENCY DEPARTMENT COURSE and DIFFERENTIAL DIAGNOSIS/MDM:   Vitals:    Vitals:    08/15/18 1510   BP: 119/82   Pulse: 109   Resp: 16   Temp: 98.6 °F (37 °C)   SpO2: 96%   Weight: 200 lb (90.7 kg)   Height: 5' 7\" (1.702 m)       Patient was given the following medications:  Medications   HYDROmorphone (DILAUDID) injection 0.5 mg (not administered)   nicotine (NICODERM CQ) 14 MG/24HR 1 patch (not administered)         I independently evaluated the patient. He was sent by the urology group for direct admission for gross hematuria following TURP procedure. He arrives to the ED by EMS and has been hemodynamically stable. In the ED, I when necessary orders placed for Dilaudid 0.5 mg. He requested a nicotine patch, which I ordered. A diet tray was ordered based on inpatient orders for regular diet.   He has

## 2018-08-16 VITALS
OXYGEN SATURATION: 93 % | TEMPERATURE: 98.2 F | HEIGHT: 67 IN | RESPIRATION RATE: 18 BRPM | BODY MASS INDEX: 31.39 KG/M2 | HEART RATE: 86 BPM | DIASTOLIC BLOOD PRESSURE: 74 MMHG | WEIGHT: 200 LBS | SYSTOLIC BLOOD PRESSURE: 115 MMHG

## 2018-08-16 LAB
ANION GAP SERPL CALCULATED.3IONS-SCNC: 8 MMOL/L (ref 3–16)
BUN BLDV-MCNC: 7 MG/DL (ref 7–20)
CALCIUM SERPL-MCNC: 7.9 MG/DL (ref 8.3–10.6)
CHLORIDE BLD-SCNC: 104 MMOL/L (ref 99–110)
CO2: 26 MMOL/L (ref 21–32)
CREAT SERPL-MCNC: 0.7 MG/DL (ref 0.8–1.3)
GFR AFRICAN AMERICAN: >60
GFR NON-AFRICAN AMERICAN: >60
GLUCOSE BLD-MCNC: 125 MG/DL (ref 70–99)
HCT VFR BLD CALC: 41.1 % (ref 40.5–52.5)
HEMOGLOBIN: 14.2 G/DL (ref 13.5–17.5)
MCH RBC QN AUTO: 32.8 PG (ref 26–34)
MCHC RBC AUTO-ENTMCNC: 34.4 G/DL (ref 31–36)
MCV RBC AUTO: 95.3 FL (ref 80–100)
PDW BLD-RTO: 12.7 % (ref 12.4–15.4)
PLATELET # BLD: 173 K/UL (ref 135–450)
PMV BLD AUTO: 8.8 FL (ref 5–10.5)
POTASSIUM REFLEX MAGNESIUM: 4 MMOL/L (ref 3.5–5.1)
RBC # BLD: 4.32 M/UL (ref 4.2–5.9)
SODIUM BLD-SCNC: 138 MMOL/L (ref 136–145)
WBC # BLD: 10.8 K/UL (ref 4–11)

## 2018-08-16 PROCEDURE — 6370000000 HC RX 637 (ALT 250 FOR IP): Performed by: PHYSICIAN ASSISTANT

## 2018-08-16 PROCEDURE — 36415 COLL VENOUS BLD VENIPUNCTURE: CPT

## 2018-08-16 PROCEDURE — 2500000003 HC RX 250 WO HCPCS: Performed by: PHYSICIAN ASSISTANT

## 2018-08-16 PROCEDURE — 80048 BASIC METABOLIC PNL TOTAL CA: CPT

## 2018-08-16 PROCEDURE — 6360000002 HC RX W HCPCS: Performed by: PHYSICIAN ASSISTANT

## 2018-08-16 PROCEDURE — 96376 TX/PRO/DX INJ SAME DRUG ADON: CPT

## 2018-08-16 PROCEDURE — G0378 HOSPITAL OBSERVATION PER HR: HCPCS

## 2018-08-16 PROCEDURE — 85027 COMPLETE CBC AUTOMATED: CPT

## 2018-08-16 RX ORDER — CEPHALEXIN 500 MG/1
500 CAPSULE ORAL EVERY 12 HOURS SCHEDULED
Qty: 5 CAPSULE | Refills: 0 | Status: SHIPPED | OUTPATIENT
Start: 2018-08-16 | End: 2018-08-19

## 2018-08-16 RX ORDER — HYDROCODONE BITARTRATE AND ACETAMINOPHEN 5; 325 MG/1; MG/1
1 TABLET ORAL EVERY 6 HOURS PRN
Qty: 20 TABLET | Refills: 0 | Status: SHIPPED | OUTPATIENT
Start: 2018-08-16 | End: 2018-08-21

## 2018-08-16 RX ADMIN — POTASSIUM CHLORIDE, DEXTROSE MONOHYDRATE AND SODIUM CHLORIDE: 150; 5; 450 INJECTION, SOLUTION INTRAVENOUS at 07:16

## 2018-08-16 RX ADMIN — HYDROCODONE BITARTRATE AND ACETAMINOPHEN 2 TABLET: 5; 325 TABLET ORAL at 12:43

## 2018-08-16 RX ADMIN — HYDROCODONE BITARTRATE AND ACETAMINOPHEN 2 TABLET: 5; 325 TABLET ORAL at 07:16

## 2018-08-16 RX ADMIN — CEPHALEXIN 500 MG: 250 CAPSULE ORAL at 09:00

## 2018-08-16 RX ADMIN — PANTOPRAZOLE SODIUM 40 MG: 40 TABLET, DELAYED RELEASE ORAL at 09:00

## 2018-08-16 RX ADMIN — HYDROMORPHONE HYDROCHLORIDE 0.5 MG: 1 INJECTION, SOLUTION INTRAMUSCULAR; INTRAVENOUS; SUBCUTANEOUS at 04:44

## 2018-08-16 ASSESSMENT — PAIN SCALES - GENERAL
PAINLEVEL_OUTOF10: 2
PAINLEVEL_OUTOF10: 7
PAINLEVEL_OUTOF10: 2
PAINLEVEL_OUTOF10: 7
PAINLEVEL_OUTOF10: 7

## 2018-08-16 NOTE — H&P
BMP:    Lab Results   Component Value Date     08/16/2018    K 4.0 08/16/2018     08/16/2018    CO2 26 08/16/2018    BUN 7 08/16/2018    LABALBU 4.3 07/23/2018    CREATININE 0.7 08/16/2018    CALCIUM 7.9 08/16/2018    GFRAA >60 08/16/2018    GFRAA 120 10/17/2012    LABGLOM >60 08/16/2018     PT/INR:    Lab Results   Component Value Date    PROTIME 11.5 03/19/2018    INR 1.02 03/19/2018     PTT:    Lab Results   Component Value Date    APTT 30.7 03/19/2018   [APTT    Urinalysis:     Imaging:      Impression/Plan:   POD 1-TURP at [facility], complicated by bleeding  AVSS  Labs okay  Urine clear, CBI stopped  Home today with moon-pt to DC moon Monday morning  Follow up next week with Dr. Tamir Moscoso, PAJUAN MANUEL

## 2018-08-20 ENCOUNTER — HOSPITAL ENCOUNTER (INPATIENT)
Age: 63
LOS: 1 days | Discharge: HOME | DRG: 948 | End: 2018-08-21
Attending: EMERGENCY MEDICINE | Admitting: INTERNAL MEDICINE
Payer: MEDICARE

## 2018-08-20 ENCOUNTER — APPOINTMENT (OUTPATIENT)
Dept: CT IMAGING | Age: 63
DRG: 948 | End: 2018-08-20
Payer: MEDICARE

## 2018-08-20 DIAGNOSIS — R11.0 NAUSEA WITHOUT VOMITING: ICD-10-CM

## 2018-08-20 DIAGNOSIS — R31.0 GROSS HEMATURIA: ICD-10-CM

## 2018-08-20 DIAGNOSIS — R31.0 HEMATURIA, GROSS: ICD-10-CM

## 2018-08-20 DIAGNOSIS — F41.1 ANXIETY STATE: ICD-10-CM

## 2018-08-20 DIAGNOSIS — R10.2 SUPRAPUBIC ABDOMINAL PAIN: Primary | ICD-10-CM

## 2018-08-20 DIAGNOSIS — Z90.79 STATUS POST TRANSURETHRAL RESECTION OF PROSTATE: ICD-10-CM

## 2018-08-20 PROBLEM — R10.9 ABDOMINAL PAIN: Status: ACTIVE | Noted: 2018-08-20

## 2018-08-20 LAB
A/G RATIO: 1.3 (ref 1.1–2.2)
ALBUMIN SERPL-MCNC: 3.9 G/DL (ref 3.4–5)
ALP BLD-CCNC: 55 U/L (ref 40–129)
ALT SERPL-CCNC: 50 U/L (ref 10–40)
AMORPHOUS: ABNORMAL /HPF
ANION GAP SERPL CALCULATED.3IONS-SCNC: 14 MMOL/L (ref 3–16)
AST SERPL-CCNC: 35 U/L (ref 15–37)
BASOPHILS ABSOLUTE: 0.1 K/UL (ref 0–0.2)
BASOPHILS RELATIVE PERCENT: 1.1 %
BILIRUB SERPL-MCNC: 0.3 MG/DL (ref 0–1)
BILIRUBIN URINE: NEGATIVE
BLOOD, URINE: ABNORMAL
BUN BLDV-MCNC: 10 MG/DL (ref 7–20)
CALCIUM SERPL-MCNC: 9.1 MG/DL (ref 8.3–10.6)
CHLORIDE BLD-SCNC: 105 MMOL/L (ref 99–110)
CLARITY: CLEAR
CO2: 21 MMOL/L (ref 21–32)
COLOR: YELLOW
CREAT SERPL-MCNC: 0.7 MG/DL (ref 0.8–1.3)
EOSINOPHILS ABSOLUTE: 1 K/UL (ref 0–0.6)
EOSINOPHILS RELATIVE PERCENT: 11.3 %
EPITHELIAL CELLS, UA: ABNORMAL /HPF
GFR AFRICAN AMERICAN: >60
GFR NON-AFRICAN AMERICAN: >60
GLOBULIN: 2.9 G/DL
GLUCOSE BLD-MCNC: 183 MG/DL (ref 70–99)
GLUCOSE URINE: NEGATIVE MG/DL
HCT VFR BLD CALC: 46 % (ref 40.5–52.5)
HEMOGLOBIN: 15.7 G/DL (ref 13.5–17.5)
KETONES, URINE: NEGATIVE MG/DL
LEUKOCYTE ESTERASE, URINE: ABNORMAL
LIPASE: 8 U/L (ref 13–60)
LYMPHOCYTES ABSOLUTE: 2.6 K/UL (ref 1–5.1)
LYMPHOCYTES RELATIVE PERCENT: 27.8 %
MCH RBC QN AUTO: 32.3 PG (ref 26–34)
MCHC RBC AUTO-ENTMCNC: 34.1 G/DL (ref 31–36)
MCV RBC AUTO: 94.8 FL (ref 80–100)
MICROSCOPIC EXAMINATION: YES
MONOCYTES ABSOLUTE: 0.7 K/UL (ref 0–1.3)
MONOCYTES RELATIVE PERCENT: 8 %
MUCUS: ABNORMAL /LPF
NEUTROPHILS ABSOLUTE: 4.8 K/UL (ref 1.7–7.7)
NEUTROPHILS RELATIVE PERCENT: 51.8 %
NITRITE, URINE: NEGATIVE
PDW BLD-RTO: 12.6 % (ref 12.4–15.4)
PH UA: 6
PLATELET # BLD: 243 K/UL (ref 135–450)
PMV BLD AUTO: 8.7 FL (ref 5–10.5)
POTASSIUM SERPL-SCNC: 4.2 MMOL/L (ref 3.5–5.1)
PROTEIN UA: 100 MG/DL
RBC # BLD: 4.85 M/UL (ref 4.2–5.9)
RBC UA: >100 /HPF (ref 0–2)
SODIUM BLD-SCNC: 140 MMOL/L (ref 136–145)
SPECIFIC GRAVITY UA: >=1.03
TOTAL PROTEIN: 6.8 G/DL (ref 6.4–8.2)
URINE TYPE: ABNORMAL
UROBILINOGEN, URINE: 0.2 E.U./DL
WBC # BLD: 9.3 K/UL (ref 4–11)
WBC UA: ABNORMAL /HPF (ref 0–5)

## 2018-08-20 PROCEDURE — 6360000004 HC RX CONTRAST MEDICATION: Performed by: NURSE PRACTITIONER

## 2018-08-20 PROCEDURE — 96375 TX/PRO/DX INJ NEW DRUG ADDON: CPT

## 2018-08-20 PROCEDURE — 99285 EMERGENCY DEPT VISIT HI MDM: CPT

## 2018-08-20 PROCEDURE — 51702 INSERT TEMP BLADDER CATH: CPT

## 2018-08-20 PROCEDURE — 85025 COMPLETE CBC W/AUTO DIFF WBC: CPT

## 2018-08-20 PROCEDURE — 6360000002 HC RX W HCPCS: Performed by: INTERNAL MEDICINE

## 2018-08-20 PROCEDURE — 81001 URINALYSIS AUTO W/SCOPE: CPT

## 2018-08-20 PROCEDURE — 94761 N-INVAS EAR/PLS OXIMETRY MLT: CPT

## 2018-08-20 PROCEDURE — 96374 THER/PROPH/DIAG INJ IV PUSH: CPT

## 2018-08-20 PROCEDURE — 6360000002 HC RX W HCPCS

## 2018-08-20 PROCEDURE — 96376 TX/PRO/DX INJ SAME DRUG ADON: CPT

## 2018-08-20 PROCEDURE — 6370000000 HC RX 637 (ALT 250 FOR IP): Performed by: NURSE PRACTITIONER

## 2018-08-20 PROCEDURE — 6360000002 HC RX W HCPCS: Performed by: EMERGENCY MEDICINE

## 2018-08-20 PROCEDURE — 74177 CT ABD & PELVIS W/CONTRAST: CPT

## 2018-08-20 PROCEDURE — 1200000000 HC SEMI PRIVATE

## 2018-08-20 PROCEDURE — 6360000002 HC RX W HCPCS: Performed by: NURSE PRACTITIONER

## 2018-08-20 PROCEDURE — 2580000003 HC RX 258: Performed by: INTERNAL MEDICINE

## 2018-08-20 PROCEDURE — 2700000000 HC OXYGEN THERAPY PER DAY

## 2018-08-20 PROCEDURE — 80053 COMPREHEN METABOLIC PANEL: CPT

## 2018-08-20 PROCEDURE — 83690 ASSAY OF LIPASE: CPT

## 2018-08-20 RX ORDER — LORAZEPAM 2 MG/ML
INJECTION INTRAMUSCULAR
Status: COMPLETED
Start: 2018-08-20 | End: 2018-08-20

## 2018-08-20 RX ORDER — ONDANSETRON 2 MG/ML
4 INJECTION INTRAMUSCULAR; INTRAVENOUS ONCE
Status: COMPLETED | OUTPATIENT
Start: 2018-08-20 | End: 2018-08-20

## 2018-08-20 RX ORDER — HYDROCODONE BITARTRATE AND ACETAMINOPHEN 5; 325 MG/1; MG/1
1 TABLET ORAL EVERY 6 HOURS PRN
Status: DISCONTINUED | OUTPATIENT
Start: 2018-08-20 | End: 2018-08-21 | Stop reason: HOSPADM

## 2018-08-20 RX ORDER — PANTOPRAZOLE SODIUM 40 MG/1
40 TABLET, DELAYED RELEASE ORAL
Status: DISCONTINUED | OUTPATIENT
Start: 2018-08-21 | End: 2018-08-21 | Stop reason: HOSPADM

## 2018-08-20 RX ORDER — SODIUM CHLORIDE 0.9 % (FLUSH) 0.9 %
10 SYRINGE (ML) INJECTION PRN
Status: DISCONTINUED | OUTPATIENT
Start: 2018-08-20 | End: 2018-08-21 | Stop reason: HOSPADM

## 2018-08-20 RX ORDER — ONDANSETRON 2 MG/ML
4 INJECTION INTRAMUSCULAR; INTRAVENOUS EVERY 6 HOURS PRN
Status: DISCONTINUED | OUTPATIENT
Start: 2018-08-20 | End: 2018-08-21 | Stop reason: HOSPADM

## 2018-08-20 RX ORDER — CIPROFLOXACIN 2 MG/ML
400 INJECTION, SOLUTION INTRAVENOUS EVERY 12 HOURS
Status: DISCONTINUED | OUTPATIENT
Start: 2018-08-20 | End: 2018-08-21 | Stop reason: HOSPADM

## 2018-08-20 RX ORDER — LORAZEPAM 2 MG/ML
0.5 INJECTION INTRAMUSCULAR ONCE
Status: COMPLETED | OUTPATIENT
Start: 2018-08-20 | End: 2018-08-20

## 2018-08-20 RX ORDER — TAMSULOSIN HYDROCHLORIDE 0.4 MG/1
0.4 CAPSULE ORAL DAILY
Status: DISCONTINUED | OUTPATIENT
Start: 2018-08-21 | End: 2018-08-21 | Stop reason: HOSPADM

## 2018-08-20 RX ORDER — SODIUM CHLORIDE 9 MG/ML
INJECTION, SOLUTION INTRAVENOUS
Status: DISPENSED
Start: 2018-08-20 | End: 2018-08-21

## 2018-08-20 RX ORDER — SODIUM CHLORIDE 0.9 % (FLUSH) 0.9 %
10 SYRINGE (ML) INJECTION EVERY 12 HOURS SCHEDULED
Status: DISCONTINUED | OUTPATIENT
Start: 2018-08-20 | End: 2018-08-21 | Stop reason: HOSPADM

## 2018-08-20 RX ADMIN — HYDROMORPHONE HYDROCHLORIDE 1 MG: 1 INJECTION, SOLUTION INTRAMUSCULAR; INTRAVENOUS; SUBCUTANEOUS at 18:54

## 2018-08-20 RX ADMIN — HYDROMORPHONE HYDROCHLORIDE 1 MG: 1 INJECTION, SOLUTION INTRAMUSCULAR; INTRAVENOUS; SUBCUTANEOUS at 15:24

## 2018-08-20 RX ADMIN — ONDANSETRON 4 MG: 2 SOLUTION INTRAMUSCULAR; INTRAVENOUS at 13:06

## 2018-08-20 RX ADMIN — IOPAMIDOL 100 ML: 755 INJECTION, SOLUTION INTRAVENOUS at 14:35

## 2018-08-20 RX ADMIN — SODIUM CHLORIDE, PRESERVATIVE FREE 10 ML: 5 INJECTION INTRAVENOUS at 23:06

## 2018-08-20 RX ADMIN — LORAZEPAM 0.5 MG: 2 INJECTION, SOLUTION INTRAMUSCULAR; INTRAVENOUS at 20:39

## 2018-08-20 RX ADMIN — LORAZEPAM 0.5 MG: 2 INJECTION INTRAMUSCULAR at 20:39

## 2018-08-20 RX ADMIN — CIPROFLOXACIN 400 MG: 2 INJECTION, SOLUTION INTRAVENOUS at 23:04

## 2018-08-20 RX ADMIN — LIDOCAINE HYDROCHLORIDE: 20 JELLY TOPICAL at 13:06

## 2018-08-20 RX ADMIN — HYDROMORPHONE HYDROCHLORIDE 1 MG: 1 INJECTION, SOLUTION INTRAMUSCULAR; INTRAVENOUS; SUBCUTANEOUS at 13:06

## 2018-08-20 ASSESSMENT — ENCOUNTER SYMPTOMS
WHEEZING: 0
SHORTNESS OF BREATH: 0
SORE THROAT: 0
NAUSEA: 0
VOMITING: 0
COLOR CHANGE: 0
ABDOMINAL PAIN: 1
DIARRHEA: 0
BACK PAIN: 0
COUGH: 0

## 2018-08-20 ASSESSMENT — PAIN DESCRIPTION - PAIN TYPE: TYPE: ACUTE PAIN

## 2018-08-20 ASSESSMENT — PAIN SCALES - GENERAL
PAINLEVEL_OUTOF10: 8
PAINLEVEL_OUTOF10: 4
PAINLEVEL_OUTOF10: 6
PAINLEVEL_OUTOF10: 0
PAINLEVEL_OUTOF10: 10
PAINLEVEL_OUTOF10: 9
PAINLEVEL_OUTOF10: 0
PAINLEVEL_OUTOF10: 10
PAINLEVEL_OUTOF10: 4

## 2018-08-20 ASSESSMENT — PAIN DESCRIPTION - LOCATION
LOCATION: BACK;PENIS
LOCATION: PENIS;ABDOMEN

## 2018-08-20 ASSESSMENT — PAIN DESCRIPTION - DESCRIPTORS: DESCRIPTORS: PRESSURE

## 2018-08-20 NOTE — ED PROVIDER NOTES
[facility] Emergency Department  8/20/18    I independently performed a history and physical on Essie Burkett. All imaging, and labs performed at this visit were reviewed. All diagnostic, treatment, and disposition decisions were made by myself in conjunction with the mid-level provider. Briefly, this is a 70 y.o. male here for abdominal pain status post prostate surgery. ED Triage Vitals [08/20/18 1232]   BP Temp Temp Source Pulse Resp SpO2 Height Weight   130/80 97.9 °F (36.6 °C) Oral 90 16 96 % 5' 7\" (1.702 m) 190 lb (86.2 kg)        Exam showed Mild abdominal tenderness. Patient admitted for further management. For further details of Jv Garcia's emergency department encounter, please see [facility]'s documentation. This chart was generated in part by using Dragon Dictation system and may contain errors related to that system including errors in grammar, punctuation, and spelling, as well as words and phrases that may be inappropriate. If there are any questions or concerns please feel free to contact the dictating provider for clarification.            Giovany Bauman DO  08/20/18 2459

## 2018-08-21 VITALS
OXYGEN SATURATION: 95 % | HEIGHT: 67 IN | SYSTOLIC BLOOD PRESSURE: 126 MMHG | HEART RATE: 70 BPM | BODY MASS INDEX: 29.82 KG/M2 | DIASTOLIC BLOOD PRESSURE: 76 MMHG | WEIGHT: 190 LBS | RESPIRATION RATE: 16 BRPM | TEMPERATURE: 97.3 F

## 2018-08-21 LAB
A/G RATIO: 1.4 (ref 1.1–2.2)
ALBUMIN SERPL-MCNC: 3.5 G/DL (ref 3.4–5)
ALP BLD-CCNC: 48 U/L (ref 40–129)
ALT SERPL-CCNC: 39 U/L (ref 10–40)
ANION GAP SERPL CALCULATED.3IONS-SCNC: 12 MMOL/L (ref 3–16)
AST SERPL-CCNC: 21 U/L (ref 15–37)
BASOPHILS ABSOLUTE: 0.1 K/UL (ref 0–0.2)
BASOPHILS RELATIVE PERCENT: 1.1 %
BILIRUB SERPL-MCNC: 0.3 MG/DL (ref 0–1)
BUN BLDV-MCNC: 9 MG/DL (ref 7–20)
CALCIUM SERPL-MCNC: 8.4 MG/DL (ref 8.3–10.6)
CHLORIDE BLD-SCNC: 105 MMOL/L (ref 99–110)
CO2: 23 MMOL/L (ref 21–32)
CREAT SERPL-MCNC: 0.7 MG/DL (ref 0.8–1.3)
EOSINOPHILS ABSOLUTE: 1.2 K/UL (ref 0–0.6)
EOSINOPHILS RELATIVE PERCENT: 10.5 %
GFR AFRICAN AMERICAN: >60
GFR NON-AFRICAN AMERICAN: >60
GLOBULIN: 2.5 G/DL
GLUCOSE BLD-MCNC: 106 MG/DL (ref 70–99)
HCT VFR BLD CALC: 42.2 % (ref 40.5–52.5)
HEMOGLOBIN: 14.6 G/DL (ref 13.5–17.5)
LYMPHOCYTES ABSOLUTE: 3.5 K/UL (ref 1–5.1)
LYMPHOCYTES RELATIVE PERCENT: 29.6 %
MCH RBC QN AUTO: 32.6 PG (ref 26–34)
MCHC RBC AUTO-ENTMCNC: 34.5 G/DL (ref 31–36)
MCV RBC AUTO: 94.5 FL (ref 80–100)
MONOCYTES ABSOLUTE: 1.1 K/UL (ref 0–1.3)
MONOCYTES RELATIVE PERCENT: 9.8 %
NEUTROPHILS ABSOLUTE: 5.7 K/UL (ref 1.7–7.7)
NEUTROPHILS RELATIVE PERCENT: 49 %
PDW BLD-RTO: 13.1 % (ref 12.4–15.4)
PLATELET # BLD: 233 K/UL (ref 135–450)
PMV BLD AUTO: 8.9 FL (ref 5–10.5)
POTASSIUM REFLEX MAGNESIUM: 3.9 MMOL/L (ref 3.5–5.1)
RBC # BLD: 4.47 M/UL (ref 4.2–5.9)
SODIUM BLD-SCNC: 140 MMOL/L (ref 136–145)
TOTAL PROTEIN: 6 G/DL (ref 6.4–8.2)
WBC # BLD: 11.7 K/UL (ref 4–11)

## 2018-08-21 PROCEDURE — 6370000000 HC RX 637 (ALT 250 FOR IP): Performed by: INTERNAL MEDICINE

## 2018-08-21 PROCEDURE — 36415 COLL VENOUS BLD VENIPUNCTURE: CPT

## 2018-08-21 PROCEDURE — 2580000003 HC RX 258: Performed by: INTERNAL MEDICINE

## 2018-08-21 PROCEDURE — 87086 URINE CULTURE/COLONY COUNT: CPT

## 2018-08-21 PROCEDURE — 6360000002 HC RX W HCPCS: Performed by: INTERNAL MEDICINE

## 2018-08-21 PROCEDURE — 80053 COMPREHEN METABOLIC PANEL: CPT

## 2018-08-21 PROCEDURE — 85025 COMPLETE CBC W/AUTO DIFF WBC: CPT

## 2018-08-21 RX ORDER — HYDROCODONE BITARTRATE AND ACETAMINOPHEN 5; 325 MG/1; MG/1
1 TABLET ORAL EVERY 6 HOURS PRN
Qty: 12 TABLET | Refills: 0 | Status: SHIPPED | OUTPATIENT
Start: 2018-08-21 | End: 2018-08-24

## 2018-08-21 RX ORDER — LORAZEPAM 1 MG/1
1 TABLET ORAL EVERY 8 HOURS PRN
Qty: 10 TABLET | Refills: 0 | Status: SHIPPED | OUTPATIENT
Start: 2018-08-21 | End: 2018-08-24

## 2018-08-21 RX ORDER — CEFUROXIME AXETIL 250 MG/1
500 TABLET ORAL 2 TIMES DAILY
Qty: 28 TABLET | Refills: 0 | Status: SHIPPED | OUTPATIENT
Start: 2018-08-21 | End: 2018-08-28

## 2018-08-21 RX ORDER — SODIUM CHLORIDE 9 MG/ML
INJECTION, SOLUTION INTRAVENOUS CONTINUOUS
Status: DISCONTINUED | OUTPATIENT
Start: 2018-08-21 | End: 2018-08-21 | Stop reason: HOSPADM

## 2018-08-21 RX ORDER — LORAZEPAM 0.5 MG/1
0.5 TABLET ORAL ONCE
Status: COMPLETED | OUTPATIENT
Start: 2018-08-21 | End: 2018-08-21

## 2018-08-21 RX ADMIN — LORAZEPAM 0.5 MG: 0.5 TABLET ORAL at 05:44

## 2018-08-21 RX ADMIN — TAMSULOSIN HYDROCHLORIDE 0.4 MG: 0.4 CAPSULE ORAL at 09:39

## 2018-08-21 RX ADMIN — CIPROFLOXACIN 400 MG: 2 INJECTION, SOLUTION INTRAVENOUS at 09:39

## 2018-08-21 RX ADMIN — HYDROCODONE BITARTRATE AND ACETAMINOPHEN 1 TABLET: 5; 325 TABLET ORAL at 01:46

## 2018-08-21 RX ADMIN — SODIUM CHLORIDE, PRESERVATIVE FREE 10 ML: 5 INJECTION INTRAVENOUS at 09:39

## 2018-08-21 RX ADMIN — SODIUM CHLORIDE: 9 INJECTION, SOLUTION INTRAVENOUS at 01:42

## 2018-08-21 RX ADMIN — PANTOPRAZOLE SODIUM 40 MG: 40 TABLET, DELAYED RELEASE ORAL at 05:44

## 2018-08-21 ASSESSMENT — PAIN DESCRIPTION - PAIN TYPE: TYPE: ACUTE PAIN

## 2018-08-21 ASSESSMENT — PAIN DESCRIPTION - LOCATION: LOCATION: OTHER (COMMENT)

## 2018-08-21 ASSESSMENT — PAIN SCALES - GENERAL: PAINLEVEL_OUTOF10: 8

## 2018-08-21 NOTE — CONSULTS
Urology Consult Note      Reason for Consult:  S/p TURP, urinary retention    History:   Pt is a 69 yo WM s/p TURP last week. He removed his moon at home and had significant pain. He attempted to self cath but got only blood. He came to the ED and a 3 way moon was placed. CT a/p showed some edema surrounding his bladder. H/H stable. When I saw him he had just had a BM and his urine had become a pink but was draining well. Meds: see med rec  Family History, Social History, Review of Systems:  Reviewed and agreed to as per chart    Exam:    Vitals:  /76   Pulse 70   Temp 97.3 °F (36.3 °C) (Oral)   Resp 16   Ht 5' 7\" (1.702 m)   Wt 190 lb (86.2 kg)   SpO2 95%   BMI 29.76 kg/m²   Temp  Av.8 °F (36.6 °C)  Min: 97.3 °F (36.3 °C)  Max: 98.2 °F (36.8 °C)    Intake/Output Summary (Last 24 hours) at 18 1050  Last data filed at 18 1046   Gross per 24 hour   Intake             1086 ml   Output             1025 ml   Net               61 ml       Physical:    Well developed, well nourished in no acute distress   Mood indicates no abnormalities. Pt doesnt appear depressed   Orientated to time and place   Neck is supple, trachea is midline   Respiratory effort is normal   Cardiovascular show no extremity swelling   Abdomen no masses or hernias are palpated, there is no tenderness. Liver and Spleen appear normal.   Skin show no abnormal lesions   Lymph nodes are not palpated in the inguinal, neck, or axillary area.      Male :   Penis appears normal and circumcised   Urethral meatus is normal in size and location   Scrotum appears normal and both testicles appear normal in size and location   22F 3 way moon in place with pink urine, cleared some while I was talking to him    Labs:  WBC:    Lab Results   Component Value Date    WBC 11.7 2018     Hemoglobin/Hematocrit:  Lab Results   Component Value Date    HGB 14.6 2018    HCT 42.2 2018     BMP:  Lab Peritoneum/Retroperitoneum: No evidence of significant adenopathy.       Bones/Soft Tissues: No evidence of lytic or sclerotic bony lesions.  No other   significant soft tissue abnormality.           Impression   1. Status post prostatectomy. Joelle Elizabeth is some edema of the urinary bladder but   no evidence of perforation.  Moon catheter is seen in the urinary bladder. 2. Mild fatty infiltration of the liver.  Redemonstration of hepatic cysts.    3. No acute gastrointestinal abnormality seen.             Impression/Plan:   Hematuria-s/p TURP, urine clear  Leave moon in for now  [id number] [address] Andrews to DC from  standpoint  Pt to follow up with Dr. Jared Youngblood later this week    Dennise Savage PA-C

## 2018-08-21 NOTE — H&P
active. Mild suprapubic pain no rebound guarding or rigidity  Musculoskeletal:  No clubbing, cyanosis or edema bilaterally. Full range of motion without deformity. Skin: Skin color, texture, turgor normal.  No rashes or lesions. Neurologic:  Neurovascularly intact without any focal sensory/motor deficits. Cranial nerves: II-XII intact, grossly non-focal.  Psychiatric:  Alert and oriented, thought content appropriate, normal insight  Capillary Refill: Brisk,< 3 seconds   Peripheral Pulses: +2 palpable, equal bilaterally       Labs:     Recent Labs      08/20/18   1235   WBC  9.3   HGB  15.7   HCT  46.0   PLT  243     Recent Labs      08/20/18   1235   NA  140   K  4.2   CL  105   CO2  21   BUN  10   CREATININE  0.7*   CALCIUM  9.1     Recent Labs      08/20/18   1235   AST  35   ALT  50*   BILITOT  0.3   ALKPHOS  55     No results for input(s): INR in the last 72 hours. No results for input(s): Glorious Bunde in the last 72 hours. Urinalysis:      Lab Results   Component Value Date    NITRU Negative 08/20/2018    WBCUA 6-10 08/20/2018    BACTERIA 1+ 07/23/2018    RBCUA >100 08/20/2018    BLOODU LARGE 08/20/2018    SPECGRAV >=1.030 08/20/2018    GLUCOSEU Negative 08/20/2018       Radiology:     Independently reviewed by me    CT ABDOMEN PELVIS W IV CONTRAST Additional Contrast? None   Final Result   1. Status post prostatectomy. There is some edema of the urinary bladder but   no evidence of perforation. Tuttle catheter is seen in the urinary bladder. 2. Mild fatty infiltration of the liver. Redemonstration of hepatic cysts. 3. No acute gastrointestinal abnormality seen. ASSESSMENT/PLAN    Abdominal pain-likely related to recent TURP. Possible cystitis. Possible irritation from placement of catheters. Continue 3-way catheter with irrigation when necessary clots.   Start empiric antibiotics Cipro 400 IV every 12  Urology has been consulted  Continue pain control with Dilaudid IV for now due to severe nausea  Patient given one-time dose of Ativan for severe anxiety  Continue Flomax    GERD-continue Protonix      DVT Prophylaxis: SCDs  Diet: DIET LOW SODIUM 2 GM;  Code Status: Full Code        Dispo - 2-3 days       Susan Boykin MD    Thank you Sommer Wade DO for the opportunity to be involved in this patient's care. If you have any questions or concerns please feel free to contact me at [phone].

## 2018-08-21 NOTE — CARE COORDINATION
[facility]    Referral received from . Writer aware pt will likely have PT/OT needs. I will continue to follow patient for needs, and arrange [place] [id number] services prior to DC. Should pt dc over the weekend please fax facesheet, order, BRAD, and H&P to [facility]. Update: 3:40, referral was received from  this am for SN and possible PT/OT needs. Pt DC'ed. MD paged for order for home care for SN follow up and PT to evaluate and treat. Orders obtained and faxed to [facility].    Cornelius Garcia RN CTN with [facility]  HPD:[phone]

## 2018-08-21 NOTE — PROGRESS NOTES
Pt transferred to C349 from ER.  AOx4. VSS. F/C in place. IV abx running. Norco available PRN for pain. Denies further needs at this time.

## 2018-08-21 NOTE — DISCHARGE SUMMARY
Hospital Discharge Summary    Patient's PCP: Darius Nolasco DO  Admit Date: 8/20/2018   Discharge Date: 8/21/2018    Admitting Physician: Dr. Renata Campa MD  Discharge Physician: Dr. Araceli Lee: urology    HPI: 69 yo M presents to ED sec to gross hematuria post TURP, admitted for further care. Brief hospital course:   Admitted with gross hematuria, recent TURP. UA abnormal concerning for UTI. Significant situational anxiety state related to the catheter, acute/chronic knee pains. Tx with 3 way moon, empiric IV abx with improvement. Plan to dc with moon and f/u with Urology for voiding trial.  Will tx with empiric PO Ceftin x 7 day course, through the [facility]. Cont home Flomax. Encouraged him to cont to ambulate. Given his severe situational anxiety, plan to dc with 3 day course prn low dose Ativan PO. [facility] for RN and PT arranged as well on dc. Discharge Diagnoses:   Patient Active Problem List   Diagnosis Code    Gastritis K29.70    Chest pain R07.9    Hyperlipidemia E78.5    Acquired varus deformity knee M21.169    Effusion of knee joint, left M25.462    Patellofemoral stress syndrome of left knee M22.2X2    Primary osteoarthritis of left knee M17.12    Reflex sympathetic dystrophy of both lower extremities G90.523    Status post total left knee replacement Z96.652    Heart palpitations R00.2    Dyspnea on exertion R06.09    Bilateral primary osteoarthritis of knee M17.0    Complex regional pain syndrome type 2 of left lower extremity G57.72    S/P transurethral resection of prostate Z90.79    Gross hematuria R31.0    Abdominal pain R10.9       Physical Exam: /76   Pulse 70   Temp 97.3 °F (36.3 °C) (Oral)   Resp 16   Ht 5' 7\" (1.702 m)   Wt 190 lb (86.2 kg)   SpO2 95%   BMI 29.76 kg/m²     No results for input(s): POCGLU in the last 72 hours.     General appearance: alert, appears stated age and cooperative  Head: Normocephalic, without obvious

## 2018-08-22 LAB — URINE CULTURE, ROUTINE: NORMAL

## 2018-09-13 ENCOUNTER — OFFICE VISIT (OUTPATIENT)
Dept: FAMILY MEDICINE CLINIC | Age: 63
End: 2018-09-13

## 2018-09-13 VITALS
HEIGHT: 67 IN | TEMPERATURE: 98.4 F | SYSTOLIC BLOOD PRESSURE: 118 MMHG | WEIGHT: 194 LBS | HEART RATE: 139 BPM | BODY MASS INDEX: 30.45 KG/M2 | OXYGEN SATURATION: 97 % | DIASTOLIC BLOOD PRESSURE: 66 MMHG

## 2018-09-13 DIAGNOSIS — R00.0 TACHYCARDIA: ICD-10-CM

## 2018-09-13 DIAGNOSIS — M54.50 CHRONIC BILATERAL LOW BACK PAIN WITHOUT SCIATICA: Primary | ICD-10-CM

## 2018-09-13 DIAGNOSIS — G89.29 CHRONIC BILATERAL LOW BACK PAIN WITHOUT SCIATICA: Primary | ICD-10-CM

## 2018-09-13 DIAGNOSIS — R32 URINARY INCONTINENCE, UNSPECIFIED TYPE: ICD-10-CM

## 2018-09-13 DIAGNOSIS — R06.02 SOB (SHORTNESS OF BREATH) ON EXERTION: ICD-10-CM

## 2018-09-13 PROCEDURE — G8427 DOCREV CUR MEDS BY ELIG CLIN: HCPCS | Performed by: NURSE PRACTITIONER

## 2018-09-13 PROCEDURE — 1101F PT FALLS ASSESS-DOCD LE1/YR: CPT | Performed by: NURSE PRACTITIONER

## 2018-09-13 PROCEDURE — 3017F COLORECTAL CA SCREEN DOC REV: CPT | Performed by: NURSE PRACTITIONER

## 2018-09-13 PROCEDURE — 1123F ACP DISCUSS/DSCN MKR DOCD: CPT | Performed by: NURSE PRACTITIONER

## 2018-09-13 PROCEDURE — 93000 ELECTROCARDIOGRAM COMPLETE: CPT | Performed by: NURSE PRACTITIONER

## 2018-09-13 PROCEDURE — 4040F PNEUMOC VAC/ADMIN/RCVD: CPT | Performed by: NURSE PRACTITIONER

## 2018-09-13 PROCEDURE — 4004F PT TOBACCO SCREEN RCVD TLK: CPT | Performed by: NURSE PRACTITIONER

## 2018-09-13 PROCEDURE — G8417 CALC BMI ABV UP PARAM F/U: HCPCS | Performed by: NURSE PRACTITIONER

## 2018-09-13 PROCEDURE — 99204 OFFICE O/P NEW MOD 45 MIN: CPT | Performed by: NURSE PRACTITIONER

## 2018-09-13 PROCEDURE — 1111F DSCHRG MED/CURRENT MED MERGE: CPT | Performed by: NURSE PRACTITIONER

## 2018-09-13 RX ORDER — SULFAMETHOXAZOLE AND TRIMETHOPRIM 800; 160 MG/1; MG/1
1 TABLET ORAL 2 TIMES DAILY
COMMUNITY
End: 2019-02-12

## 2018-09-13 RX ORDER — DIAPER,BRIEF,ADULT, DISPOSABLE
EACH MISCELLANEOUS
Qty: 32 EACH | Refills: 5 | Status: SHIPPED | OUTPATIENT
Start: 2018-09-13 | End: 2019-02-12 | Stop reason: ALTCHOICE

## 2018-09-13 RX ORDER — SULFAMETHOXAZOLE AND TRIMETHOPRIM 800; 160 MG/1; MG/1
1 TABLET ORAL 2 TIMES DAILY
COMMUNITY
End: 2018-09-13 | Stop reason: CLARIF

## 2018-09-13 ASSESSMENT — ENCOUNTER SYMPTOMS
BACK PAIN: 1
SINUS PAIN: 0
CONSTIPATION: 1
WHEEZING: 1
SHORTNESS OF BREATH: 0
COUGH: 1

## 2018-09-13 ASSESSMENT — PATIENT HEALTH QUESTIONNAIRE - PHQ9
SUM OF ALL RESPONSES TO PHQ9 QUESTIONS 1 & 2: 0
1. LITTLE INTEREST OR PLEASURE IN DOING THINGS: 0
SUM OF ALL RESPONSES TO PHQ QUESTIONS 1-9: 0
SUM OF ALL RESPONSES TO PHQ QUESTIONS 1-9: 0
2. FEELING DOWN, DEPRESSED OR HOPELESS: 0

## 2018-09-13 NOTE — PROGRESS NOTES
Patient: Radha Parekh is a 70 y.o. male who presents today with the following Chief Complaint(s):  Chief Complaint   Patient presents with   Jonah Alberto is a 71 yo male who is here to establish care. Chronic back pain- Dr Roxana Parra, referral- had an MRI  Prostate infection currently on  Bactrim sees  Dr Kyaw Wheeler, Dr Ricardo Recinos- he currently has an infection in the left knee from being allergic to the hardware. Tachycardia on and off, happens daily for the past  2-3 months, causes tiredness and weakness, was seen in ER in March, no findings, no known cause of episodes. - will refer to Dr Osvaldo Montenegro for follow up. Feels like the tachy episodes are becoming more frequent and has some pains over on the left side of chest   Gastro- Dr. Brendan Cespedes    Current Outpatient Prescriptions   Medication Sig Dispense Refill    Docusate Calcium (STOOL SOFTENER PO) Take by mouth daily      sulfamethoxazole-trimethoprim (BACTRIM DS;SEPTRA DS) 800-160 MG per tablet Take 1 tablet by mouth 2 times daily      Incontinence Supply Disposable (DEPEND UNDERWEAR SM/MED) MISC May use 2-3 a day 32 each 5    HYDROcodone-acetaminophen (NORCO) 5-325 MG per tablet Take 1 tablet by mouth every 6 hours as needed. Gina Reges tamsulosin (FLOMAX) 0.4 MG capsule       omeprazole (PRILOSEC) 40 MG delayed release capsule        No current facility-administered medications for this visit. Patient's past medical history, surgical history, family history, medications,  and allergies  were all reviewed and updated as appropriate today. Review of Systems   Constitutional: Positive for malaise/fatigue. Negative for chills and fever. HENT: Negative for congestion and sinus pain. Respiratory: Positive for cough and wheezing. Negative for shortness of breath. Smoker   Cardiovascular: Positive for chest pain and palpitations.         Tachycardia on and off, palpitations with it and some chest pain, then regular, has seen cardio, nothing found   Gastrointestinal: Positive for constipation. Genitourinary: Positive for frequency, hematuria and urgency. Urinary incontinence, retention, prostitis   Musculoskeletal: Positive for back pain and joint pain. Neurological: Positive for weakness. Negative for dizziness and headaches. Physical Exam   Constitutional: He is oriented to person, place, and time. Vital signs are normal. He appears well-developed and well-nourished. HENT:   Head: Normocephalic and atraumatic. Eyes: Conjunctivae are normal.   Neck: Normal range of motion. Cardiovascular: Normal rate, regular rhythm and normal heart sounds. No murmur heard. Pulmonary/Chest: Effort normal and breath sounds normal. No respiratory distress. He has no wheezes. He has no rales. Abdominal: Soft. Bowel sounds are normal. He exhibits no distension. There is no tenderness. There is no rebound. Musculoskeletal: Normal range of motion. Lymphadenopathy:     He has no cervical adenopathy. Neurological: He is alert and oriented to person, place, and time. Skin: Skin is warm and dry. Psychiatric: He has a normal mood and affect. His behavior is normal. Judgment and thought content normal.   Nursing note and vitals reviewed. Vitals:    09/13/18 0857   BP: 118/66   Pulse: 139   Temp: 98.4 °F (36.9 °C)   SpO2: 97%       Assessment:  Encounter Diagnoses   Name Primary?  Chronic bilateral low back pain without sciatica Yes    Tachycardia     SOB (shortness of breath) on exertion     Urinary incontinence, unspecified type        Plan:  1. Chronic bilateral low back pain without sciatica    - Gerianne Councilman, MD (Spine- Non Surgical)    2. Tachycardia    - EKG 12 lead  - Bety Loyola MD    3. SOB (shortness of breath) on exertion    - Bety Loyola MD    4.  Urinary incontinence, unspecified type    - Incontinence Supply

## 2018-09-14 ENCOUNTER — OFFICE VISIT (OUTPATIENT)
Dept: CARDIOLOGY CLINIC | Age: 63
End: 2018-09-14

## 2018-09-14 VITALS
OXYGEN SATURATION: 98 % | WEIGHT: 194 LBS | HEIGHT: 67 IN | DIASTOLIC BLOOD PRESSURE: 70 MMHG | SYSTOLIC BLOOD PRESSURE: 118 MMHG | HEART RATE: 111 BPM | BODY MASS INDEX: 30.45 KG/M2

## 2018-09-14 DIAGNOSIS — R06.02 SOB (SHORTNESS OF BREATH): ICD-10-CM

## 2018-09-14 DIAGNOSIS — G47.33 OSA (OBSTRUCTIVE SLEEP APNEA): ICD-10-CM

## 2018-09-14 DIAGNOSIS — R06.09 DYSPNEA ON EXERTION: ICD-10-CM

## 2018-09-14 DIAGNOSIS — R00.2 HEART PALPITATIONS: Primary | ICD-10-CM

## 2018-09-14 PROCEDURE — 1123F ACP DISCUSS/DSCN MKR DOCD: CPT | Performed by: INTERNAL MEDICINE

## 2018-09-14 PROCEDURE — 4004F PT TOBACCO SCREEN RCVD TLK: CPT | Performed by: INTERNAL MEDICINE

## 2018-09-14 PROCEDURE — 1111F DSCHRG MED/CURRENT MED MERGE: CPT | Performed by: INTERNAL MEDICINE

## 2018-09-14 PROCEDURE — G8417 CALC BMI ABV UP PARAM F/U: HCPCS | Performed by: INTERNAL MEDICINE

## 2018-09-14 PROCEDURE — 1101F PT FALLS ASSESS-DOCD LE1/YR: CPT | Performed by: INTERNAL MEDICINE

## 2018-09-14 PROCEDURE — 3017F COLORECTAL CA SCREEN DOC REV: CPT | Performed by: INTERNAL MEDICINE

## 2018-09-14 PROCEDURE — 4040F PNEUMOC VAC/ADMIN/RCVD: CPT | Performed by: INTERNAL MEDICINE

## 2018-09-14 PROCEDURE — G8427 DOCREV CUR MEDS BY ELIG CLIN: HCPCS | Performed by: INTERNAL MEDICINE

## 2018-09-14 PROCEDURE — 99214 OFFICE O/P EST MOD 30 MIN: CPT | Performed by: INTERNAL MEDICINE

## 2018-09-14 NOTE — PATIENT INSTRUCTIONS
Plan:  Cardiac event monitor for 1 week  Smoking cessation discussed  Pulmonology referral for sleep study  May continue with knee surgery  Follow up as needed

## 2018-09-14 NOTE — PROGRESS NOTES
[address]   Cardiac Followup    Referring Provider:  RADHA Queen - CNP     Chief Complaint   Patient presents with    New Patient    Chest Pain     #3 pain    Shortness of Breath   chest pain    History of Present Illness:  F/U  sob, chest pain, tachycardia. Reports chronic CP that has been present for years, constant, rest and exertion, + assoc sob. Reports his HR goes up to 130s. Reports rapid HR for several years that is unchanged. Also notes fatigue and snoring. He continues to smoke. Has not had a sleep study. Also needs cardiac clearance. Constant back, knee and prostate pain. Past Medical History:   has a past medical history of Arthritis; Bladder distention; Hyperlipidemia; Knee joint dislocation; Urinary incontinence; and Urinary retention. Surgical History:   has a past surgical history that includes Knee arthroscopy (Left, 03/03/2017); Cholecystectomy; other surgical history (Left, 06/06/2017); Total knee arthroplasty (Left, 2017); and Prostate surgery. Social History:   reports that he has been smoking Cigarettes. He has a 30.00 pack-year smoking history. He has never used smokeless tobacco. He reports that he does not drink alcohol or use drugs. Family History:  family history includes Alcohol Abuse in his brother; Alzheimer's Disease in his maternal grandmother; Arthritis in his sister; Cancer in his maternal grandmother; Diabetes in his paternal grandmother; Heart Disease in his mother; Other in his sister and sister; Thyroid Disease in his father. Home Medications:  Prior to Admission medications    Medication Sig Start Date End Date Taking?  Authorizing Provider   Docusate Calcium (STOOL SOFTENER PO) Take by mouth daily   Yes Historical Provider, MD   sulfamethoxazole-trimethoprim (BACTRIM DS;SEPTRA DS) 800-160 MG per tablet Take 1 tablet by mouth 2 times daily   Yes Historical Provider, MD   Incontinence Supply Disposable (DEPEND UNDERWEAR SM/MED) MISC May use 2-3 a day 9/13/18  Yes RADHA Lynn - TANGELA   HYDROcodone-acetaminophen (NORCO) 5-325 MG per tablet Take 1 tablet by mouth every 6 hours as needed. Janette Roberts Historical Provider, MD   tamsulosin (FLOMAX) 0.4 MG capsule  7/17/18  Yes Historical Provider, MD   omeprazole (PRILOSEC) 40 MG delayed release capsule daily  3/27/18  Yes Historical Provider, MD        Allergies: Other; Percocet [oxycodone-acetaminophen]; Morphine; Morphine and related; and Propoxyphene     Review of Systems:   · Constitutional: there has been no unanticipated weight loss. There's been no change in energy level, sleep pattern, or activity level. · Eyes: No visual changes or diplopia. No scleral icterus. · ENT: No Headaches, hearing loss or vertigo. No mouth sores or sore throat. · Cardiovascular: Reviewed in HPI  · Respiratory: No cough or wheezing, no sputum production. No hematemesis. · Gastrointestinal: No abdominal pain, appetite loss, blood in stools. No change in bowel or bladder habits. · Genitourinary: No dysuria, trouble voiding, or hematuria. · Musculoskeletal:  No gait disturbance, weakness or joint complaints. · Integumentary: No rash or pruritis. · Neurological: No headache, diplopia, change in muscle strength, numbness or tingling. No change in gait, balance, coordination, mood, affect, memory, mentation, behavior. · Psychiatric: No anxiety, no depression. · Endocrine: No malaise, fatigue or temperature intolerance. No excessive thirst, fluid intake, or urination. No tremor. · Hematologic/Lymphatic: No abnormal bruising or bleeding, blood clots or swollen lymph nodes. · Allergic/Immunologic: No nasal congestion or hives.     Physical Examination:    Vitals:    09/14/18 0849   BP: 118/70   Pulse: 111   SpO2: 98%        Constitutional and General Appearance: NAD   Respiratory:  · Normal excursion and expansion without use of accessory muscles  · Resp Auscultation: Normal breath sounds without

## 2018-09-17 ENCOUNTER — TELEPHONE (OUTPATIENT)
Dept: FAMILY MEDICINE CLINIC | Age: 63
End: 2018-09-17

## 2018-09-17 DIAGNOSIS — R00.0 TACHYCARDIA: Primary | ICD-10-CM

## 2018-09-17 NOTE — TELEPHONE ENCOUNTER
Pt was seen by the cardiologist and states \"things just aren't right\". He would like to have a return call from Lady Morrow to discuss. He wasn't sure if he needed an appointment again. Please call pt.

## 2018-09-17 NOTE — TELEPHONE ENCOUNTER
Please call Dr. Gaston Pierre office and see if we can get this patient in for second opinion. Tachycardia without exertion, left side chest pain off and on. When he gets the tachycardic episodes he is very fatigued afterward.  May see anyone in the group    Dr. Norman [facility]  Internal medicine - cardiovascular disease   [address], [address]   [phone]

## 2018-09-19 ENCOUNTER — TELEPHONE (OUTPATIENT)
Dept: CARDIOLOGY CLINIC | Age: 63
End: 2018-09-19

## 2018-09-19 ENCOUNTER — OFFICE VISIT (OUTPATIENT)
Dept: PULMONOLOGY | Age: 63
End: 2018-09-19

## 2018-09-19 VITALS
TEMPERATURE: 97.7 F | WEIGHT: 195 LBS | OXYGEN SATURATION: 96 % | SYSTOLIC BLOOD PRESSURE: 132 MMHG | DIASTOLIC BLOOD PRESSURE: 49 MMHG | HEART RATE: 79 BPM | HEIGHT: 67 IN | RESPIRATION RATE: 16 BRPM | BODY MASS INDEX: 30.61 KG/M2

## 2018-09-19 DIAGNOSIS — R06.09 DYSPNEA ON EXERTION: Primary | ICD-10-CM

## 2018-09-19 DIAGNOSIS — E66.9 CLASS 1 OBESITY WITH SERIOUS COMORBIDITY AND BODY MASS INDEX (BMI) OF 30.0 TO 30.9 IN ADULT, UNSPECIFIED OBESITY TYPE: ICD-10-CM

## 2018-09-19 DIAGNOSIS — K76.89 HEPATIC CYST: ICD-10-CM

## 2018-09-19 DIAGNOSIS — K21.9 GASTROESOPHAGEAL REFLUX DISEASE, ESOPHAGITIS PRESENCE NOT SPECIFIED: ICD-10-CM

## 2018-09-19 DIAGNOSIS — Z90.79 S/P TRANSURETHRAL RESECTION OF PROSTATE: ICD-10-CM

## 2018-09-19 DIAGNOSIS — F17.200 CURRENT SMOKER: ICD-10-CM

## 2018-09-19 DIAGNOSIS — R29.818 SUSPECTED SLEEP APNEA: ICD-10-CM

## 2018-09-19 PROBLEM — E66.811 CLASS 1 OBESITY IN ADULT: Status: ACTIVE | Noted: 2018-09-19

## 2018-09-19 PROCEDURE — G8427 DOCREV CUR MEDS BY ELIG CLIN: HCPCS | Performed by: INTERNAL MEDICINE

## 2018-09-19 PROCEDURE — 1123F ACP DISCUSS/DSCN MKR DOCD: CPT | Performed by: INTERNAL MEDICINE

## 2018-09-19 PROCEDURE — 3017F COLORECTAL CA SCREEN DOC REV: CPT | Performed by: INTERNAL MEDICINE

## 2018-09-19 PROCEDURE — 99204 OFFICE O/P NEW MOD 45 MIN: CPT | Performed by: INTERNAL MEDICINE

## 2018-09-19 PROCEDURE — 1101F PT FALLS ASSESS-DOCD LE1/YR: CPT | Performed by: INTERNAL MEDICINE

## 2018-09-19 PROCEDURE — 4040F PNEUMOC VAC/ADMIN/RCVD: CPT | Performed by: INTERNAL MEDICINE

## 2018-09-19 PROCEDURE — 4004F PT TOBACCO SCREEN RCVD TLK: CPT | Performed by: INTERNAL MEDICINE

## 2018-09-19 PROCEDURE — G8417 CALC BMI ABV UP PARAM F/U: HCPCS | Performed by: INTERNAL MEDICINE

## 2018-09-19 PROCEDURE — 1111F DSCHRG MED/CURRENT MED MERGE: CPT | Performed by: INTERNAL MEDICINE

## 2018-09-19 ASSESSMENT — SLEEP AND FATIGUE QUESTIONNAIRES
HOW LIKELY ARE YOU TO NOD OFF OR FALL ASLEEP WHILE SITTING AND READING: 3
HOW LIKELY ARE YOU TO NOD OFF OR FALL ASLEEP WHILE SITTING INACTIVE IN A PUBLIC PLACE: 3
HOW LIKELY ARE YOU TO NOD OFF OR FALL ASLEEP WHEN YOU ARE A PASSENGER IN A CAR FOR AN HOUR WITHOUT A BREAK: 0
HOW LIKELY ARE YOU TO NOD OFF OR FALL ASLEEP WHILE SITTING QUIETLY AFTER LUNCH WITHOUT ALCOHOL: 3
HOW LIKELY ARE YOU TO NOD OFF OR FALL ASLEEP WHILE LYING DOWN TO REST IN THE AFTERNOON WHEN CIRCUMSTANCES PERMIT: 3
ESS TOTAL SCORE: 18
HOW LIKELY ARE YOU TO NOD OFF OR FALL ASLEEP WHILE SITTING AND TALKING TO SOMEONE: 3
HOW LIKELY ARE YOU TO NOD OFF OR FALL ASLEEP IN A CAR, WHILE STOPPED FOR A FEW MINUTES IN TRAFFIC: 0
NECK CIRCUMFERENCE (INCHES): 18
HOW LIKELY ARE YOU TO NOD OFF OR FALL ASLEEP WHILE WATCHING TV: 3

## 2018-09-19 NOTE — PATIENT INSTRUCTIONS
Tips to Help You Stop Smoking (taken from Up-To-Date)      Cigarette smoking is a preventable cause of death in the [place]. Quitting smoking now can decrease your risk of getting smoking-related illnesses like heart disease, stroke, cancer & COPD. S = Set a quit date. T = Tell family, friends, and the people around you that you plan to quit. A = Anticipate or plan ahead for the tough times you'll face while quitting. R = Remove cigarettes and other tobacco products from your home, car, and work. T = Talk to your doctor about getting help to quit. Your doctor may give you medicines to reduce your craving for cigarettes & the unpleasant symptoms that happen when you stop smoking (called withdrawal symptoms). What are the symptoms of withdrawal?  The symptoms include:   ?Trouble sleeping   ? Being irritable, anxious or restless   ? Getting frustrated or angry   ? Having trouble thinking clearly  Nicotine replacement therapy eases withdrawal and reduces your bodys craving for nicotine, the main drug found in cigarettes. Non-prescription forms of nicotine replacement include skin patches, lozenges, and gum. Two prescription medications are available that have been proven to help people stop smoking:  ? Bupropion is a prescription medicine that reduces your desire to smoke. This medicine is sold under the brand names Zyban and Wellbutrin & as a generic formulation. ? Varenicline (brand name: Chantix) is a prescription medicine that reduces withdrawal symptoms and cigarette cravings. If you take bupropion or varenicline and you have any new or worsening depressed mood or thoughts of harming yourself or someone else, stop taking the medicine and call your doctor. Buproprion should not be taken by anyone with a history of seizure or epilepsy. Will I gain weight if I quit?  Yes, you might gain a few pounds.  But quitting smoking will have a much more positive effect on your health than weighing a few pounds more. Plus, you can help prevent some weight gain by being more active and eating less. Taking the medicine bupropion might help control weight gain. What else can I do to improve my chances of quitting?  You can:  ?Start exercising. ?Stay away from smokers and places that you associate with smoking. If people close to you smoke, ask them to quit with you. ? Keep gum, hard candy, or something to put in your mouth handy. If you get a craving for a cigarette, try one of these instead. ?Dont give up, even if you start smoking again. It takes most people a few tries before they succeed. You can also get help from a free phone line (7-392-QUITNOW) or go online to www.smokefree.gov. Your pulmonary team is here to help you quit. Call for assistance [phone]  PFT PREP  No smoking, caffeine or chocolate day of test.  Hold breathing medications for 24 hours EXCEPT for Levalbuterol (Xopenex) and Albuterol, hold these medications 8 hours prior to test.   No allergy medications (prescribed or over the counter) for 24 hours prior to the test.  No oral steroids 1 week prior to the test, no inhaled for 48 hours prior to the test.   If you develop a Respiratory infection, you will need to reschedule test 4-6 weeks later. PFT PREP  You have been scheduled for a Pulmonary Function Test on 10/4/18 at 12:30 at [facility].   Nothing my mouth 1 hour prior to test (water only is OK). No smoking or inhalers 4 hours prior to test unless directed differently by the physician. Please PRE-REGISTER at [phone] option 2, option 2,prior to your test date. Also, please remember to arrive 20 to 30 minutes prior to testing unless otherwise instructed. Sleep Hygiene. .. Tips for better sleep. .. Avoid naps. This will ensure you are sleepy at bedtime.   If you have to take a nap, sleep less than 1 hour, before 3 pm.  Sleep only when sleepy; this reduces the time you are awake in when you go to bed. Some people find it helpful to assign a worry period in the evening or late afternoon to write down your worries and get them out of your system. The [facility] Center at [address] [place] [address], [address] [place] [address]                                                 Phone: [phone] Fax: [phone]    Your home sleep test is scheduled to be picked up at the Sleep center located at [address] [place] [address] on 10/8/18 at 8:30pm . We ask that you please phone the [facility] Patient Pre-Services ([phone]) at least 3-5 days prior to your sleep study to pre-register. Failing to pre-register may ultimately cause your insurance to not pay for this procedure. Please refrain from or reduce the use of caffeine and/or alcohol prior to your sleep study and avoid napping the day of your study as these will affect the accuracy of your test results. If you should need to cancel or reschedule your appointment, please call the Sleep Center at [phone] as soon as possible.

## 2018-09-19 NOTE — PROGRESS NOTES
discomfort, patient does have reflux symptoms, patient also says that he has been taking pain medications and recently has been experiencing constipation for which he has been taking some stool softeners, patient says that he cannot ambulate much because of the pain and discomfort, patient says that he snores at nighttime, patient feels tired in the daytime and having no energy, patient does not have any dysuria now, patient says that he smokes about half to 1 pack a day but recently was told by his spouse that he is smoking more in the range of 1.5 packs a day, patient says that he has recently lost 20 pounds in 8 weeks time because of all the issues with his pain and the prostate, patient says that he used to be a  till May of last year, patient does give history suggestive of sleep fragmentation, patient does not have any change in the ambient environment or any sick contacts, patient does not have any confusion or lethargy at this time, no other pertinent review of system of concern     Past Medical History:   Diagnosis Date    Arthritis     knee    Bladder distention     Patient sees Dr. Bhavik Jarvis Hyperlipidemia     Knee joint dislocation     left    Urinary incontinence     Urinary retention     SELF CATH 3X/DAY- no longer caths, still working on urinary issues with uro       Past Surgical History:   Procedure Laterality Date    CHOLECYSTECTOMY      KNEE ARTHROSCOPY Left 03/03/2017     LEFT KNEE ARTHROSCOPY, SYNOVECTOMY      OTHER SURGICAL HISTORY Left 06/06/2017    LEFT TOTAL KNEE ARTHROPLASTY     PROSTATE SURGERY      TOTAL KNEE ARTHROPLASTY Left 2017       Allergies   Allergen Reactions    Other Anaphylaxis    Percocet [Oxycodone-Acetaminophen] Shortness Of Breath     Felt like a heart attack  Felt like a heart attack      Morphine Other (See Comments)     Other reaction(s):  Other (See Comments)  Hallucinations  Hallucinations  Lights me up    Morphine And Related Other

## 2018-09-19 NOTE — LETTER
[facility]  [address] [place] [address]  Phone: [phone]  Fax: [phone]    Zaida Villanueva MD        September 19, 2018       Patient: Lauro Kiran   MR Number: [id number]   YOB: 1955   Date of Visit: 9/19/2018       Dear Dr. Yesika Apodaca: Thank you for the request for consultation for [employer] to me for the evaluation of TITUS. Below are the relevant portions of my assessment and plan of care. If you have questions, please do not hesitate to call me. I look forward to following Romeo Felder along with you.     Sincerely,        Marisela Pinedo MD    CC providers:  Ani Woodall MD  [phone]  [address]OLES, APRN - CNP  [address]  VIA In Basket

## 2018-09-19 NOTE — TELEPHONE ENCOUNTER
Received call from Gauri Wang at [facility]. Patient seen in their office yesterday. He is currently wearing event monitor placed during 9/14/18 visit with [address] Brenna Mayorga asking for any episodes seen on event monitor. Patient with 2 minute run of ST, , corresponding with reported symptoms. No other episodes. LMOVM informing Brenna Mayorga of this.

## 2018-09-27 ENCOUNTER — TELEPHONE (OUTPATIENT)
Dept: CARDIOLOGY CLINIC | Age: 63
End: 2018-09-27

## 2018-09-27 PROCEDURE — 93228 REMOTE 30 DAY ECG REV/REPORT: CPT | Performed by: INTERNAL MEDICINE

## 2018-10-02 ENCOUNTER — TELEPHONE (OUTPATIENT)
Dept: CARDIOLOGY CLINIC | Age: 63
End: 2018-10-02

## 2018-10-03 ENCOUNTER — OFFICE VISIT (OUTPATIENT)
Dept: ORTHOPEDIC SURGERY | Age: 63
End: 2018-10-03
Payer: MEDICARE

## 2018-10-03 ENCOUNTER — TELEPHONE (OUTPATIENT)
Dept: PULMONOLOGY | Age: 63
End: 2018-10-03

## 2018-10-03 VITALS — HEIGHT: 67 IN | WEIGHT: 180 LBS | BODY MASS INDEX: 28.25 KG/M2

## 2018-10-03 DIAGNOSIS — N39.498 OTHER URINARY INCONTINENCE: Primary | ICD-10-CM

## 2018-10-03 PROCEDURE — G8484 FLU IMMUNIZE NO ADMIN: HCPCS | Performed by: PHYSICIAN ASSISTANT

## 2018-10-03 PROCEDURE — 99203 OFFICE O/P NEW LOW 30 MIN: CPT | Performed by: PHYSICIAN ASSISTANT

## 2018-10-03 PROCEDURE — G8417 CALC BMI ABV UP PARAM F/U: HCPCS | Performed by: PHYSICIAN ASSISTANT

## 2018-10-03 PROCEDURE — G8427 DOCREV CUR MEDS BY ELIG CLIN: HCPCS | Performed by: PHYSICIAN ASSISTANT

## 2018-10-03 PROCEDURE — 1101F PT FALLS ASSESS-DOCD LE1/YR: CPT | Performed by: PHYSICIAN ASSISTANT

## 2018-10-03 PROCEDURE — 3017F COLORECTAL CA SCREEN DOC REV: CPT | Performed by: PHYSICIAN ASSISTANT

## 2018-10-03 RX ORDER — ALPRAZOLAM 1 MG/1
TABLET ORAL
Qty: 2 TABLET | Refills: 0 | Status: SHIPPED | OUTPATIENT
Start: 2018-10-03 | End: 2018-10-10 | Stop reason: SDUPTHER

## 2018-10-10 ENCOUNTER — TELEPHONE (OUTPATIENT)
Dept: ORTHOPEDIC SURGERY | Age: 63
End: 2018-10-10

## 2018-10-10 ENCOUNTER — HOSPITAL ENCOUNTER (OUTPATIENT)
Age: 63
Discharge: HOME | End: 2018-10-10
Payer: MEDICARE

## 2018-10-10 DIAGNOSIS — M50.20 CERVICAL DISC HERNIATION: Primary | ICD-10-CM

## 2018-10-10 DIAGNOSIS — N39.498 OTHER URINARY INCONTINENCE: ICD-10-CM

## 2018-10-10 LAB
ABO/RH: NORMAL
ALBUMIN SERPL-MCNC: 4.3 G/DL (ref 3.4–5)
ANION GAP SERPL CALCULATED.3IONS-SCNC: 15 MMOL/L (ref 3–16)
ANTIBODY SCREEN: NORMAL
APTT: 32 SEC (ref 26–36)
BACTERIA: ABNORMAL /HPF
BASOPHILS ABSOLUTE: 0.1 K/UL (ref 0–0.2)
BASOPHILS RELATIVE PERCENT: 1.1 %
BILIRUBIN URINE: NEGATIVE
BLOOD, URINE: ABNORMAL
BUN BLDV-MCNC: 13 MG/DL (ref 7–20)
CALCIUM SERPL-MCNC: 9.4 MG/DL (ref 8.3–10.6)
CHLORIDE BLD-SCNC: 102 MMOL/L (ref 99–110)
CLARITY: ABNORMAL
CO2: 23 MMOL/L (ref 21–32)
COLOR: YELLOW
CREAT SERPL-MCNC: 0.8 MG/DL (ref 0.8–1.3)
EOSINOPHILS ABSOLUTE: 0.2 K/UL (ref 0–0.6)
EOSINOPHILS RELATIVE PERCENT: 3.8 %
GFR AFRICAN AMERICAN: >60
GFR NON-AFRICAN AMERICAN: >60
GLUCOSE BLD-MCNC: 108 MG/DL (ref 70–99)
GLUCOSE URINE: NEGATIVE MG/DL
HCT VFR BLD CALC: 45.8 % (ref 40.5–52.5)
HEMOGLOBIN: 15.7 G/DL (ref 13.5–17.5)
INR BLD: 1.04 (ref 0.86–1.14)
KETONES, URINE: ABNORMAL MG/DL
LEUKOCYTE ESTERASE, URINE: ABNORMAL
LYMPHOCYTES ABSOLUTE: 2 K/UL (ref 1–5.1)
LYMPHOCYTES RELATIVE PERCENT: 35.5 %
MCH RBC QN AUTO: 32.4 PG (ref 26–34)
MCHC RBC AUTO-ENTMCNC: 34.3 G/DL (ref 31–36)
MCV RBC AUTO: 94.5 FL (ref 80–100)
MICROSCOPIC EXAMINATION: YES
MONOCYTES ABSOLUTE: 0.9 K/UL (ref 0–1.3)
MONOCYTES RELATIVE PERCENT: 15.7 %
NEUTROPHILS ABSOLUTE: 2.5 K/UL (ref 1.7–7.7)
NEUTROPHILS RELATIVE PERCENT: 43.9 %
NITRITE, URINE: POSITIVE
PDW BLD-RTO: 12.8 % (ref 12.4–15.4)
PH UA: 5.5
PLATELET # BLD: 238 K/UL (ref 135–450)
PMV BLD AUTO: 9.8 FL (ref 5–10.5)
POTASSIUM SERPL-SCNC: 4.5 MMOL/L (ref 3.5–5.1)
PROTEIN UA: 30 MG/DL
PROTHROMBIN TIME: 11.9 SEC (ref 9.8–13)
RBC # BLD: 4.85 M/UL (ref 4.2–5.9)
RBC UA: ABNORMAL /HPF (ref 0–2)
SODIUM BLD-SCNC: 140 MMOL/L (ref 136–145)
SPECIFIC GRAVITY UA: >=1.03
URINE TYPE: ABNORMAL
UROBILINOGEN, URINE: 0.2 E.U./DL
WBC # BLD: 5.6 K/UL (ref 4–11)
WBC UA: >100 /HPF (ref 0–5)

## 2018-10-10 PROCEDURE — 82040 ASSAY OF SERUM ALBUMIN: CPT

## 2018-10-10 PROCEDURE — 86900 BLOOD TYPING SEROLOGIC ABO: CPT

## 2018-10-10 PROCEDURE — 87186 SC STD MICRODIL/AGAR DIL: CPT

## 2018-10-10 PROCEDURE — 85025 COMPLETE CBC W/AUTO DIFF WBC: CPT

## 2018-10-10 PROCEDURE — 80048 BASIC METABOLIC PNL TOTAL CA: CPT

## 2018-10-10 PROCEDURE — 81001 URINALYSIS AUTO W/SCOPE: CPT

## 2018-10-10 PROCEDURE — 85610 PROTHROMBIN TIME: CPT

## 2018-10-10 PROCEDURE — 83036 HEMOGLOBIN GLYCOSYLATED A1C: CPT

## 2018-10-10 PROCEDURE — 86850 RBC ANTIBODY SCREEN: CPT

## 2018-10-10 PROCEDURE — 36415 COLL VENOUS BLD VENIPUNCTURE: CPT

## 2018-10-10 PROCEDURE — 85730 THROMBOPLASTIN TIME PARTIAL: CPT

## 2018-10-10 PROCEDURE — 87077 CULTURE AEROBIC IDENTIFY: CPT

## 2018-10-10 PROCEDURE — 87086 URINE CULTURE/COLONY COUNT: CPT

## 2018-10-10 PROCEDURE — 86901 BLOOD TYPING SEROLOGIC RH(D): CPT

## 2018-10-10 RX ORDER — ALPRAZOLAM 1 MG/1
TABLET ORAL
Qty: 2 TABLET | Refills: 0 | Status: SHIPPED | OUTPATIENT
Start: 2018-10-10 | End: 2018-10-11 | Stop reason: ALTCHOICE

## 2018-10-11 ENCOUNTER — OFFICE VISIT (OUTPATIENT)
Dept: FAMILY MEDICINE CLINIC | Age: 63
End: 2018-10-11
Payer: MEDICARE

## 2018-10-11 VITALS
HEART RATE: 69 BPM | SYSTOLIC BLOOD PRESSURE: 112 MMHG | TEMPERATURE: 98.1 F | WEIGHT: 193 LBS | DIASTOLIC BLOOD PRESSURE: 64 MMHG | BODY MASS INDEX: 30.29 KG/M2 | OXYGEN SATURATION: 97 % | HEIGHT: 67 IN

## 2018-10-11 DIAGNOSIS — R06.02 SOB (SHORTNESS OF BREATH): ICD-10-CM

## 2018-10-11 DIAGNOSIS — Z01.818 PREOP EXAMINATION: Primary | ICD-10-CM

## 2018-10-11 DIAGNOSIS — R00.2 HEART PALPITATIONS: ICD-10-CM

## 2018-10-11 LAB
ESTIMATED AVERAGE GLUCOSE: 116.9 MG/DL
HBA1C MFR BLD: 5.7 %

## 2018-10-11 PROCEDURE — 3017F COLORECTAL CA SCREEN DOC REV: CPT | Performed by: NURSE PRACTITIONER

## 2018-10-11 PROCEDURE — G8427 DOCREV CUR MEDS BY ELIG CLIN: HCPCS | Performed by: NURSE PRACTITIONER

## 2018-10-11 PROCEDURE — 1101F PT FALLS ASSESS-DOCD LE1/YR: CPT | Performed by: NURSE PRACTITIONER

## 2018-10-11 PROCEDURE — 99212 OFFICE O/P EST SF 10 MIN: CPT | Performed by: NURSE PRACTITIONER

## 2018-10-11 PROCEDURE — G8484 FLU IMMUNIZE NO ADMIN: HCPCS | Performed by: NURSE PRACTITIONER

## 2018-10-11 PROCEDURE — G8417 CALC BMI ABV UP PARAM F/U: HCPCS | Performed by: NURSE PRACTITIONER

## 2018-10-11 NOTE — PROGRESS NOTES
[facility]  [phone]  Fax: [phone]   Pre-operative History and Physical      DIAGNOSIS:  Chronic left knee pain    PROCEDURE:  Revision left total knee arthroplasty       History Obtained From:  patient    HISTORY OF PRESENT ILLNESS:    The patient is a 70 y.o. male with significant past medical history of chronic left knee pain who presents preop exam       Past Medical History:   Diagnosis Date    Arthritis     knee    Bladder distention     Patient sees Dr. Estefania Wang Hyperlipidemia     Knee joint dislocation     left    Urinary incontinence     Urinary retention     SELF CATH 3X/DAY-     Past Surgical History:   Procedure Laterality Date    CHOLECYSTECTOMY      CYSTOSCOPY      KNEE ARTHROSCOPY Left 03/03/2017     LEFT KNEE ARTHROSCOPY, SYNOVECTOMY      OTHER SURGICAL HISTORY Left 06/06/2017    LEFT TOTAL KNEE ARTHROPLASTY     PROSTATE SURGERY      TOTAL KNEE ARTHROPLASTY Left 2017     Current Outpatient Prescriptions   Medication Sig Dispense Refill    Mirabegron (MYRBETRIQ PO) Take 1 tablet by mouth daily      Citalopram Hydrobromide (CELEXA PO) Take by mouth daily      metoprolol tartrate (LOPRESSOR) 25 MG tablet Take 25 mg by mouth      Docusate Calcium (STOOL SOFTENER PO) Take by mouth daily      Incontinence Supply Disposable (DEPEND UNDERWEAR SM/MED) MISC May use 2-3 a day 32 each 5    HYDROcodone-acetaminophen (NORCO) 5-325 MG per tablet Take 1 tablet by mouth every 6 hours as needed. Daphne Brown tamsulosin (FLOMAX) 0.4 MG capsule       omeprazole (PRILOSEC) 40 MG delayed release capsule daily       sulfamethoxazole-trimethoprim (BACTRIM DS;SEPTRA DS) 800-160 MG per tablet Take 1 tablet by mouth 2 times daily       No current facility-administered medications for this visit. Allergies: Other; Percocet [oxycodone-acetaminophen];  Morphine; Morphine and related; Nickel; and Propoxyphene  History of allergic reaction to anesthesia:  No     History   Smoking Status   

## 2018-10-12 ENCOUNTER — TELEPHONE (OUTPATIENT)
Dept: ORTHOPEDIC SURGERY | Age: 63
End: 2018-10-12

## 2018-10-12 ENCOUNTER — TELEPHONE (OUTPATIENT)
Dept: PULMONOLOGY | Age: 63
End: 2018-10-12

## 2018-10-12 LAB
ORGANISM: ABNORMAL
URINE CULTURE, ROUTINE: ABNORMAL
URINE CULTURE, ROUTINE: ABNORMAL

## 2018-10-12 NOTE — TELEPHONE ENCOUNTER
Mr. Michelle Zarco called this date inquiring about his MRI order for [facility]. He said he called to schedule the MRI and was told by [facility] that the order had not been sent. He would like a call back regarding this. His phone number is:  [phone].

## 2018-10-19 RX ORDER — CYCLOBENZAPRINE HCL 10 MG
10 TABLET ORAL 3 TIMES DAILY PRN
Status: CANCELLED | OUTPATIENT
Start: 2018-10-19

## 2018-10-19 RX ORDER — CELECOXIB 100 MG/1
100 CAPSULE ORAL 2 TIMES DAILY
Status: CANCELLED | OUTPATIENT
Start: 2018-10-19

## 2018-10-22 ENCOUNTER — HOSPITAL ENCOUNTER (OUTPATIENT)
Age: 63
Setting detail: SURGERY ADMIT
Discharge: HOME | End: 2018-10-22
Attending: ORTHOPAEDIC SURGERY | Admitting: ORTHOPAEDIC SURGERY
Payer: MEDICARE

## 2018-10-22 VITALS
DIASTOLIC BLOOD PRESSURE: 72 MMHG | SYSTOLIC BLOOD PRESSURE: 112 MMHG | RESPIRATION RATE: 12 BRPM | WEIGHT: 190 LBS | TEMPERATURE: 97 F | OXYGEN SATURATION: 97 % | HEART RATE: 77 BPM | BODY MASS INDEX: 29.82 KG/M2 | HEIGHT: 67 IN

## 2018-10-22 LAB
BACTERIA: ABNORMAL /HPF
BILIRUBIN URINE: NEGATIVE
BLOOD, URINE: ABNORMAL
CLARITY: ABNORMAL
COLOR: YELLOW
EPITHELIAL CELLS, UA: ABNORMAL /HPF
GLUCOSE URINE: NEGATIVE MG/DL
KETONES, URINE: NEGATIVE MG/DL
LEUKOCYTE ESTERASE, URINE: ABNORMAL
MICROSCOPIC EXAMINATION: YES
MUCUS: ABNORMAL /LPF
NITRITE, URINE: NEGATIVE
PH UA: 5.5
PROTEIN UA: 30 MG/DL
RBC UA: ABNORMAL /HPF (ref 0–2)
SPECIFIC GRAVITY UA: >=1.03
URINE TYPE: ABNORMAL
UROBILINOGEN, URINE: 0.2 E.U./DL
WBC UA: >100 /HPF (ref 0–5)

## 2018-10-22 PROCEDURE — 81001 URINALYSIS AUTO W/SCOPE: CPT

## 2018-10-22 PROCEDURE — 87086 URINE CULTURE/COLONY COUNT: CPT

## 2018-10-22 RX ORDER — SODIUM CHLORIDE, SODIUM LACTATE, POTASSIUM CHLORIDE, CALCIUM CHLORIDE 600; 310; 30; 20 MG/100ML; MG/100ML; MG/100ML; MG/100ML
INJECTION, SOLUTION INTRAVENOUS CONTINUOUS
Status: DISCONTINUED | OUTPATIENT
Start: 2018-10-22 | End: 2018-10-22 | Stop reason: HOSPADM

## 2018-10-22 RX ORDER — LIDOCAINE HYDROCHLORIDE 10 MG/ML
1 INJECTION, SOLUTION EPIDURAL; INFILTRATION; INTRACAUDAL; PERINEURAL
Status: DISCONTINUED | OUTPATIENT
Start: 2018-10-22 | End: 2018-10-22 | Stop reason: HOSPADM

## 2018-10-22 RX ORDER — ACETAMINOPHEN 500 MG
1000 TABLET ORAL ONCE
Status: DISCONTINUED | OUTPATIENT
Start: 2018-10-22 | End: 2018-10-22 | Stop reason: HOSPADM

## 2018-10-22 RX ORDER — TRANEXAMIC ACID 100 MG/ML
15 INJECTION, SOLUTION INTRAVENOUS ONCE
Status: DISCONTINUED | OUTPATIENT
Start: 2018-10-22 | End: 2018-10-22 | Stop reason: HOSPADM

## 2018-10-22 RX ORDER — CYCLOBENZAPRINE HCL 10 MG
10 TABLET ORAL ONCE
Status: DISCONTINUED | OUTPATIENT
Start: 2018-10-22 | End: 2018-10-22 | Stop reason: HOSPADM

## 2018-10-22 RX ORDER — CELECOXIB 100 MG/1
200 CAPSULE ORAL ONCE
Status: DISCONTINUED | OUTPATIENT
Start: 2018-10-22 | End: 2018-10-22 | Stop reason: HOSPADM

## 2018-10-22 ASSESSMENT — PAIN - FUNCTIONAL ASSESSMENT: PAIN_FUNCTIONAL_ASSESSMENT: 0-10

## 2018-10-22 ASSESSMENT — PAIN DESCRIPTION - DESCRIPTORS: DESCRIPTORS: ACHING

## 2018-10-22 NOTE — PROGRESS NOTES
SPOKE TO DR. INGRAM ABOUT URINE CULTURE RESLTS. PT IS BEING TREATED FOR URINE INFECTION AND IS STILL INFECTED.  SENT CLEAN CATCH FOR U/A;C/S.

## 2018-10-24 ENCOUNTER — TELEPHONE (OUTPATIENT)
Dept: ORTHOPEDIC SURGERY | Age: 63
End: 2018-10-24

## 2018-10-25 LAB — URINE CULTURE, ROUTINE: NORMAL

## 2018-11-07 ENCOUNTER — NURSE ONLY (OUTPATIENT)
Dept: FAMILY MEDICINE CLINIC | Age: 63
End: 2018-11-07
Payer: MEDICARE

## 2018-11-07 DIAGNOSIS — R31.9 URINARY TRACT INFECTION WITH HEMATURIA, SITE UNSPECIFIED: Primary | ICD-10-CM

## 2018-11-07 DIAGNOSIS — N39.0 URINARY TRACT INFECTION WITH HEMATURIA, SITE UNSPECIFIED: Primary | ICD-10-CM

## 2018-11-07 LAB
BILIRUBIN URINE: NEGATIVE
BLOOD, URINE: ABNORMAL
CLARITY: ABNORMAL
COLOR: ABNORMAL
EPITHELIAL CELLS, UA: 1 /HPF (ref 0–5)
GLUCOSE URINE: NEGATIVE MG/DL
HYALINE CASTS: 3 /LPF (ref 0–8)
KETONES, URINE: NEGATIVE MG/DL
LEUKOCYTE ESTERASE, URINE: NEGATIVE
MICROSCOPIC EXAMINATION: YES
NITRITE, URINE: NEGATIVE
PH UA: 5
PROTEIN UA: ABNORMAL MG/DL
RBC UA: 9 /HPF (ref 0–4)
SPECIFIC GRAVITY UA: >1.03
URINE TYPE: ABNORMAL
UROBILINOGEN, URINE: 0.2 E.U./DL
WBC UA: 4 /HPF (ref 0–5)

## 2018-11-07 PROCEDURE — 81003 URINALYSIS AUTO W/O SCOPE: CPT | Performed by: NURSE PRACTITIONER

## 2018-11-09 LAB — URINE CULTURE, ROUTINE: NORMAL

## 2019-02-01 ENCOUNTER — HOSPITAL ENCOUNTER (OUTPATIENT)
Age: 64
Discharge: HOME | End: 2019-02-01
Payer: MEDICARE

## 2019-02-01 LAB
ABO/RH: NORMAL
ALBUMIN SERPL-MCNC: 4.6 G/DL (ref 3.4–5)
ANION GAP SERPL CALCULATED.3IONS-SCNC: 17 MMOL/L (ref 3–16)
ANTIBODY SCREEN: NORMAL
APTT: 32.6 SEC (ref 26–36)
BACTERIA: ABNORMAL /HPF
BASOPHILS ABSOLUTE: 0.1 K/UL (ref 0–0.2)
BASOPHILS RELATIVE PERCENT: 0.8 %
BILIRUBIN URINE: NEGATIVE
BLOOD, URINE: ABNORMAL
BUN BLDV-MCNC: 14 MG/DL (ref 7–20)
CALCIUM SERPL-MCNC: 9.2 MG/DL (ref 8.3–10.6)
CARBOXYHEMOGLOBIN: 2.6 % (ref 0–1.5)
CASTS: ABNORMAL /LPF
CHLORIDE BLD-SCNC: 106 MMOL/L (ref 99–110)
CLARITY: CLEAR
CO2: 21 MMOL/L (ref 21–32)
COLOR: YELLOW
CREAT SERPL-MCNC: 0.8 MG/DL (ref 0.8–1.3)
EKG ATRIAL RATE: 71 BPM
EKG DIAGNOSIS: NORMAL
EKG P AXIS: 67 DEGREES
EKG P-R INTERVAL: 142 MS
EKG Q-T INTERVAL: 370 MS
EKG QRS DURATION: 86 MS
EKG QTC CALCULATION (BAZETT): 402 MS
EKG R AXIS: 19 DEGREES
EKG T AXIS: 25 DEGREES
EKG VENTRICULAR RATE: 71 BPM
EOSINOPHILS ABSOLUTE: 0.3 K/UL (ref 0–0.6)
EOSINOPHILS RELATIVE PERCENT: 4.3 %
EPITHELIAL CELLS, UA: ABNORMAL /HPF
ESTIMATED AVERAGE GLUCOSE: 122.6 MG/DL
GFR AFRICAN AMERICAN: >60
GFR NON-AFRICAN AMERICAN: >60
GLUCOSE BLD-MCNC: 117 MG/DL (ref 70–99)
GLUCOSE URINE: NEGATIVE MG/DL
HBA1C MFR BLD: 5.9 %
HCT VFR BLD CALC: 48.8 % (ref 40.5–52.5)
HEMOGLOBIN: 16.9 G/DL (ref 13.5–17.5)
INR BLD: 0.99 (ref 0.86–1.14)
KETONES, URINE: 15 MG/DL
LEUKOCYTE ESTERASE, URINE: NEGATIVE
LYMPHOCYTES ABSOLUTE: 2.8 K/UL (ref 1–5.1)
LYMPHOCYTES RELATIVE PERCENT: 37.7 %
MCH RBC QN AUTO: 32.7 PG (ref 26–34)
MCHC RBC AUTO-ENTMCNC: 34.6 G/DL (ref 31–36)
MCV RBC AUTO: 94.7 FL (ref 80–100)
MICROSCOPIC EXAMINATION: YES
MONOCYTES ABSOLUTE: 0.6 K/UL (ref 0–1.3)
MONOCYTES RELATIVE PERCENT: 8.1 %
MUCUS: ABNORMAL /LPF
NEUTROPHILS ABSOLUTE: 3.6 K/UL (ref 1.7–7.7)
NEUTROPHILS RELATIVE PERCENT: 49.1 %
NITRITE, URINE: NEGATIVE
PDW BLD-RTO: 13 % (ref 12.4–15.4)
PH UA: 6
PLATELET # BLD: 238 K/UL (ref 135–450)
PMV BLD AUTO: 9.5 FL (ref 5–10.5)
POTASSIUM SERPL-SCNC: 4.2 MMOL/L (ref 3.5–5.1)
PROTEIN UA: NEGATIVE MG/DL
PROTHROMBIN TIME: 11.3 SEC (ref 9.8–13)
RBC # BLD: 5.15 M/UL (ref 4.2–5.9)
RBC UA: ABNORMAL /HPF (ref 0–2)
SODIUM BLD-SCNC: 144 MMOL/L (ref 136–145)
SPECIFIC GRAVITY UA: 1.02
URINE TYPE: ABNORMAL
UROBILINOGEN, URINE: 0.2 E.U./DL
WBC # BLD: 7.4 K/UL (ref 4–11)
WBC UA: ABNORMAL /HPF (ref 0–5)

## 2019-02-01 PROCEDURE — 86901 BLOOD TYPING SEROLOGIC RH(D): CPT

## 2019-02-01 PROCEDURE — 93005 ELECTROCARDIOGRAM TRACING: CPT | Performed by: ORTHOPAEDIC SURGERY

## 2019-02-01 PROCEDURE — 82375 ASSAY CARBOXYHB QUANT: CPT

## 2019-02-01 PROCEDURE — 87086 URINE CULTURE/COLONY COUNT: CPT

## 2019-02-01 PROCEDURE — 85730 THROMBOPLASTIN TIME PARTIAL: CPT

## 2019-02-01 PROCEDURE — 82040 ASSAY OF SERUM ALBUMIN: CPT

## 2019-02-01 PROCEDURE — 36415 COLL VENOUS BLD VENIPUNCTURE: CPT

## 2019-02-01 PROCEDURE — 85610 PROTHROMBIN TIME: CPT

## 2019-02-01 PROCEDURE — 93010 ELECTROCARDIOGRAM REPORT: CPT | Performed by: INTERNAL MEDICINE

## 2019-02-01 PROCEDURE — 86850 RBC ANTIBODY SCREEN: CPT

## 2019-02-01 PROCEDURE — 83036 HEMOGLOBIN GLYCOSYLATED A1C: CPT

## 2019-02-01 PROCEDURE — 80048 BASIC METABOLIC PNL TOTAL CA: CPT

## 2019-02-01 PROCEDURE — 85025 COMPLETE CBC W/AUTO DIFF WBC: CPT

## 2019-02-01 PROCEDURE — 86900 BLOOD TYPING SEROLOGIC ABO: CPT

## 2019-02-01 PROCEDURE — 81001 URINALYSIS AUTO W/SCOPE: CPT

## 2019-02-02 LAB — URINE CULTURE, ROUTINE: NORMAL

## 2019-02-12 ENCOUNTER — OFFICE VISIT (OUTPATIENT)
Dept: FAMILY MEDICINE CLINIC | Age: 64
End: 2019-02-12
Payer: MEDICARE

## 2019-02-12 VITALS
HEART RATE: 84 BPM | OXYGEN SATURATION: 96 % | BODY MASS INDEX: 31.23 KG/M2 | WEIGHT: 199 LBS | TEMPERATURE: 98.4 F | SYSTOLIC BLOOD PRESSURE: 118 MMHG | HEIGHT: 67 IN | DIASTOLIC BLOOD PRESSURE: 64 MMHG

## 2019-02-12 DIAGNOSIS — Z01.818 PREOP EXAMINATION: Primary | ICD-10-CM

## 2019-02-12 DIAGNOSIS — G89.29 CHRONIC PAIN OF LEFT KNEE: ICD-10-CM

## 2019-02-12 DIAGNOSIS — M25.562 CHRONIC PAIN OF LEFT KNEE: ICD-10-CM

## 2019-02-12 DIAGNOSIS — M17.12 PRIMARY OSTEOARTHRITIS OF LEFT KNEE: Chronic | ICD-10-CM

## 2019-02-12 PROCEDURE — 3017F COLORECTAL CA SCREEN DOC REV: CPT | Performed by: NURSE PRACTITIONER

## 2019-02-12 PROCEDURE — 99212 OFFICE O/P EST SF 10 MIN: CPT | Performed by: NURSE PRACTITIONER

## 2019-02-12 PROCEDURE — G8427 DOCREV CUR MEDS BY ELIG CLIN: HCPCS | Performed by: NURSE PRACTITIONER

## 2019-02-12 PROCEDURE — G8484 FLU IMMUNIZE NO ADMIN: HCPCS | Performed by: NURSE PRACTITIONER

## 2019-02-12 PROCEDURE — 1101F PT FALLS ASSESS-DOCD LE1/YR: CPT | Performed by: NURSE PRACTITIONER

## 2019-02-12 PROCEDURE — G8417 CALC BMI ABV UP PARAM F/U: HCPCS | Performed by: NURSE PRACTITIONER

## 2019-02-12 ASSESSMENT — PATIENT HEALTH QUESTIONNAIRE - PHQ9: DEPRESSION UNABLE TO ASSESS: URGENT/EMERGENT SITUATION

## 2019-02-16 ENCOUNTER — ANESTHESIA EVENT (OUTPATIENT)
Dept: OPERATING ROOM | Age: 64
DRG: 468 | End: 2019-02-16
Payer: MEDICARE

## 2019-02-18 ENCOUNTER — HOSPITAL ENCOUNTER (INPATIENT)
Age: 64
LOS: 3 days | Discharge: HOME HEALTH | DRG: 468 | End: 2019-02-21
Attending: ORTHOPAEDIC SURGERY | Admitting: ORTHOPAEDIC SURGERY
Payer: MEDICARE

## 2019-02-18 ENCOUNTER — APPOINTMENT (OUTPATIENT)
Dept: GENERAL RADIOLOGY | Age: 64
DRG: 468 | End: 2019-02-18
Attending: ORTHOPAEDIC SURGERY
Payer: MEDICARE

## 2019-02-18 ENCOUNTER — ANESTHESIA (OUTPATIENT)
Dept: OPERATING ROOM | Age: 64
DRG: 468 | End: 2019-02-18
Payer: MEDICARE

## 2019-02-18 VITALS — SYSTOLIC BLOOD PRESSURE: 101 MMHG | DIASTOLIC BLOOD PRESSURE: 73 MMHG | OXYGEN SATURATION: 97 %

## 2019-02-18 DIAGNOSIS — Z96.659 HISTORY OF REVISION OF TOTAL KNEE ARTHROPLASTY: Primary | ICD-10-CM

## 2019-02-18 DIAGNOSIS — M25.562 ACUTE PAIN OF LEFT KNEE: ICD-10-CM

## 2019-02-18 PROBLEM — I10 HTN (HYPERTENSION): Status: ACTIVE | Noted: 2019-02-18

## 2019-02-18 LAB
ABO/RH: NORMAL
ANTIBODY SCREEN: NORMAL
GLUCOSE BLD-MCNC: 141 MG/DL (ref 70–99)
GLUCOSE BLD-MCNC: 148 MG/DL (ref 70–99)
PERFORMED ON: ABNORMAL
PERFORMED ON: ABNORMAL

## 2019-02-18 PROCEDURE — 3700000001 HC ADD 15 MINUTES (ANESTHESIA): Performed by: ORTHOPAEDIC SURGERY

## 2019-02-18 PROCEDURE — 51798 US URINE CAPACITY MEASURE: CPT

## 2019-02-18 PROCEDURE — 88300 SURGICAL PATH GROSS: CPT

## 2019-02-18 PROCEDURE — 6360000002 HC RX W HCPCS: Performed by: NURSE ANESTHETIST, CERTIFIED REGISTERED

## 2019-02-18 PROCEDURE — 2580000003 HC RX 258: Performed by: ORTHOPAEDIC SURGERY

## 2019-02-18 PROCEDURE — 6370000000 HC RX 637 (ALT 250 FOR IP): Performed by: ANESTHESIOLOGY

## 2019-02-18 PROCEDURE — 6360000002 HC RX W HCPCS: Performed by: ORTHOPAEDIC SURGERY

## 2019-02-18 PROCEDURE — 2580000003 HC RX 258: Performed by: ANESTHESIOLOGY

## 2019-02-18 PROCEDURE — 94761 N-INVAS EAR/PLS OXIMETRY MLT: CPT

## 2019-02-18 PROCEDURE — 2580000003 HC RX 258

## 2019-02-18 PROCEDURE — 0SPD0JZ REMOVAL OF SYNTHETIC SUBSTITUTE FROM LEFT KNEE JOINT, OPEN APPROACH: ICD-10-PCS | Performed by: ORTHOPAEDIC SURGERY

## 2019-02-18 PROCEDURE — C1776 JOINT DEVICE (IMPLANTABLE): HCPCS | Performed by: ORTHOPAEDIC SURGERY

## 2019-02-18 PROCEDURE — 3600000005 HC SURGERY LEVEL 5 BASE: Performed by: ORTHOPAEDIC SURGERY

## 2019-02-18 PROCEDURE — 3700000000 HC ANESTHESIA ATTENDED CARE: Performed by: ORTHOPAEDIC SURGERY

## 2019-02-18 PROCEDURE — 3209999900 FLUORO FOR SURGICAL PROCEDURES

## 2019-02-18 PROCEDURE — 73560 X-RAY EXAM OF KNEE 1 OR 2: CPT

## 2019-02-18 PROCEDURE — 97110 THERAPEUTIC EXERCISES: CPT

## 2019-02-18 PROCEDURE — C9290 INJ, BUPIVACAINE LIPOSOME: HCPCS | Performed by: ORTHOPAEDIC SURGERY

## 2019-02-18 PROCEDURE — 0SRD0J9 REPLACEMENT OF LEFT KNEE JOINT WITH SYNTHETIC SUBSTITUTE, CEMENTED, OPEN APPROACH: ICD-10-PCS | Performed by: ORTHOPAEDIC SURGERY

## 2019-02-18 PROCEDURE — 97162 PT EVAL MOD COMPLEX 30 MIN: CPT

## 2019-02-18 PROCEDURE — 2700000000 HC OXYGEN THERAPY PER DAY

## 2019-02-18 PROCEDURE — 97535 SELF CARE MNGMENT TRAINING: CPT

## 2019-02-18 PROCEDURE — 2500000003 HC RX 250 WO HCPCS: Performed by: ORTHOPAEDIC SURGERY

## 2019-02-18 PROCEDURE — 86900 BLOOD TYPING SEROLOGIC ABO: CPT

## 2019-02-18 PROCEDURE — L1830 KO IMMOB CANVAS LONG PRE OTS: HCPCS | Performed by: ORTHOPAEDIC SURGERY

## 2019-02-18 PROCEDURE — 86850 RBC ANTIBODY SCREEN: CPT

## 2019-02-18 PROCEDURE — C1713 ANCHOR/SCREW BN/BN,TIS/BN: HCPCS | Performed by: ORTHOPAEDIC SURGERY

## 2019-02-18 PROCEDURE — 97530 THERAPEUTIC ACTIVITIES: CPT

## 2019-02-18 PROCEDURE — 2709999900 HC NON-CHARGEABLE SUPPLY: Performed by: ORTHOPAEDIC SURGERY

## 2019-02-18 PROCEDURE — 1200000000 HC SEMI PRIVATE

## 2019-02-18 PROCEDURE — 7100000000 HC PACU RECOVERY - FIRST 15 MIN: Performed by: ORTHOPAEDIC SURGERY

## 2019-02-18 PROCEDURE — 97166 OT EVAL MOD COMPLEX 45 MIN: CPT

## 2019-02-18 PROCEDURE — 3E0T3BZ INTRODUCTION OF ANESTHETIC AGENT INTO PERIPHERAL NERVES AND PLEXI, PERCUTANEOUS APPROACH: ICD-10-PCS | Performed by: ANESTHESIOLOGY

## 2019-02-18 PROCEDURE — 2500000003 HC RX 250 WO HCPCS: Performed by: NURSE ANESTHETIST, CERTIFIED REGISTERED

## 2019-02-18 PROCEDURE — 7100000001 HC PACU RECOVERY - ADDTL 15 MIN: Performed by: ORTHOPAEDIC SURGERY

## 2019-02-18 PROCEDURE — 86901 BLOOD TYPING SEROLOGIC RH(D): CPT

## 2019-02-18 PROCEDURE — 3600000015 HC SURGERY LEVEL 5 ADDTL 15MIN: Performed by: ORTHOPAEDIC SURGERY

## 2019-02-18 PROCEDURE — 6370000000 HC RX 637 (ALT 250 FOR IP): Performed by: PHYSICIAN ASSISTANT

## 2019-02-18 PROCEDURE — 6370000000 HC RX 637 (ALT 250 FOR IP): Performed by: ORTHOPAEDIC SURGERY

## 2019-02-18 DEVICE — LEGION TIB CONE ID 20 SHORT
Type: IMPLANTABLE DEVICE | Site: KNEE | Status: FUNCTIONAL
Brand: LEGION

## 2019-02-18 DEVICE — LEGION PRESSFIT STEM 14MM X 160MM
Type: IMPLANTABLE DEVICE | Site: KNEE | Status: FUNCTIONAL
Brand: LEGION

## 2019-02-18 DEVICE — LEGION PRESSFIT STEM 13MM X 160MM
Type: IMPLANTABLE DEVICE | Site: KNEE | Status: FUNCTIONAL
Brand: LEGION

## 2019-02-18 DEVICE — LEGION REVISION TIBIAL BASEPLATE                                    SIZE 4 LEFT
Type: IMPLANTABLE DEVICE | Site: KNEE | Status: FUNCTIONAL
Brand: LEGION

## 2019-02-18 DEVICE — LEGION OXINIUM CONSTRAINED FEMORAL                                    SIZE 6 LEFT
Type: IMPLANTABLE DEVICE | Site: KNEE | Status: FUNCTIONAL
Brand: LEGION

## 2019-02-18 DEVICE — GENESIS II CONSTRAINED ARTICULAR                                    INSERT SIZE 3-4 11MM
Type: IMPLANTABLE DEVICE | Site: KNEE | Status: FUNCTIONAL
Brand: GENESIS II

## 2019-02-18 DEVICE — CEMENT BNE 40 GM RADIOPAQUE BA SIMPLEX P: Type: IMPLANTABLE DEVICE | Site: KNEE | Status: FUNCTIONAL

## 2019-02-18 DEVICE — LEGION FEM CONE ID 18MM LEFT
Type: IMPLANTABLE DEVICE | Site: KNEE | Status: FUNCTIONAL
Brand: LEGION

## 2019-02-18 RX ORDER — ONDANSETRON 2 MG/ML
4 INJECTION INTRAMUSCULAR; INTRAVENOUS
Status: DISCONTINUED | OUTPATIENT
Start: 2019-02-18 | End: 2019-02-18 | Stop reason: HOSPADM

## 2019-02-18 RX ORDER — ACETAMINOPHEN 325 MG/1
650 TABLET ORAL EVERY 4 HOURS PRN
Status: DISCONTINUED | OUTPATIENT
Start: 2019-02-18 | End: 2019-02-21 | Stop reason: HOSPADM

## 2019-02-18 RX ORDER — TAMSULOSIN HYDROCHLORIDE 0.4 MG/1
0.4 CAPSULE ORAL DAILY
Status: DISCONTINUED | OUTPATIENT
Start: 2019-02-18 | End: 2019-02-21 | Stop reason: HOSPADM

## 2019-02-18 RX ORDER — PANTOPRAZOLE SODIUM 40 MG/1
40 TABLET, DELAYED RELEASE ORAL
Status: DISCONTINUED | OUTPATIENT
Start: 2019-02-19 | End: 2019-02-21 | Stop reason: HOSPADM

## 2019-02-18 RX ORDER — CELECOXIB 100 MG/1
100 CAPSULE ORAL 2 TIMES DAILY
Status: DISCONTINUED | OUTPATIENT
Start: 2019-02-18 | End: 2019-02-21 | Stop reason: HOSPADM

## 2019-02-18 RX ORDER — OXYCODONE HYDROCHLORIDE 5 MG/1
10 TABLET ORAL EVERY 4 HOURS PRN
Status: DISCONTINUED | OUTPATIENT
Start: 2019-02-18 | End: 2019-02-18

## 2019-02-18 RX ORDER — SODIUM CHLORIDE 0.9 % (FLUSH) 0.9 %
10 SYRINGE (ML) INJECTION EVERY 12 HOURS SCHEDULED
Status: DISCONTINUED | OUTPATIENT
Start: 2019-02-18 | End: 2019-02-21 | Stop reason: HOSPADM

## 2019-02-18 RX ORDER — SODIUM CHLORIDE, SODIUM LACTATE, POTASSIUM CHLORIDE, CALCIUM CHLORIDE 600; 310; 30; 20 MG/100ML; MG/100ML; MG/100ML; MG/100ML
INJECTION, SOLUTION INTRAVENOUS CONTINUOUS
Status: DISCONTINUED | OUTPATIENT
Start: 2019-02-18 | End: 2019-02-18

## 2019-02-18 RX ORDER — DEXTROSE MONOHYDRATE 25 G/50ML
12.5 INJECTION, SOLUTION INTRAVENOUS PRN
Status: DISCONTINUED | OUTPATIENT
Start: 2019-02-18 | End: 2019-02-21 | Stop reason: HOSPADM

## 2019-02-18 RX ORDER — PROPOFOL 10 MG/ML
INJECTION, EMULSION INTRAVENOUS PRN
Status: DISCONTINUED | OUTPATIENT
Start: 2019-02-18 | End: 2019-02-18 | Stop reason: SDUPTHER

## 2019-02-18 RX ORDER — DOCUSATE SODIUM 100 MG/1
100 CAPSULE, LIQUID FILLED ORAL 2 TIMES DAILY
Status: DISCONTINUED | OUTPATIENT
Start: 2019-02-18 | End: 2019-02-21 | Stop reason: HOSPADM

## 2019-02-18 RX ORDER — DEXTROSE MONOHYDRATE 50 MG/ML
100 INJECTION, SOLUTION INTRAVENOUS PRN
Status: DISCONTINUED | OUTPATIENT
Start: 2019-02-18 | End: 2019-02-21 | Stop reason: HOSPADM

## 2019-02-18 RX ORDER — MEPERIDINE HYDROCHLORIDE 50 MG/ML
12.5 INJECTION INTRAMUSCULAR; INTRAVENOUS; SUBCUTANEOUS EVERY 5 MIN PRN
Status: DISCONTINUED | OUTPATIENT
Start: 2019-02-18 | End: 2019-02-18 | Stop reason: HOSPADM

## 2019-02-18 RX ORDER — OXYCODONE HYDROCHLORIDE 5 MG/1
5 TABLET ORAL EVERY 4 HOURS PRN
Status: DISCONTINUED | OUTPATIENT
Start: 2019-02-18 | End: 2019-02-18

## 2019-02-18 RX ORDER — DEXTROSE, SODIUM CHLORIDE, AND POTASSIUM CHLORIDE 5; .45; .15 G/100ML; G/100ML; G/100ML
INJECTION INTRAVENOUS CONTINUOUS
Status: DISCONTINUED | OUTPATIENT
Start: 2019-02-18 | End: 2019-02-21 | Stop reason: HOSPADM

## 2019-02-18 RX ORDER — LABETALOL HYDROCHLORIDE 5 MG/ML
5 INJECTION, SOLUTION INTRAVENOUS EVERY 10 MIN PRN
Status: DISCONTINUED | OUTPATIENT
Start: 2019-02-18 | End: 2019-02-18 | Stop reason: HOSPADM

## 2019-02-18 RX ORDER — HYDROCODONE BITARTRATE AND ACETAMINOPHEN 5; 325 MG/1; MG/1
2 TABLET ORAL EVERY 4 HOURS PRN
Status: DISCONTINUED | OUTPATIENT
Start: 2019-02-18 | End: 2019-02-21 | Stop reason: HOSPADM

## 2019-02-18 RX ORDER — LIDOCAINE HYDROCHLORIDE 10 MG/ML
2 INJECTION, SOLUTION INFILTRATION; PERINEURAL
Status: DISCONTINUED | OUTPATIENT
Start: 2019-02-18 | End: 2019-02-18 | Stop reason: HOSPADM

## 2019-02-18 RX ORDER — CYCLOBENZAPRINE HCL 10 MG
10 TABLET ORAL 3 TIMES DAILY PRN
Status: DISCONTINUED | OUTPATIENT
Start: 2019-02-18 | End: 2019-02-21 | Stop reason: HOSPADM

## 2019-02-18 RX ORDER — CYCLOBENZAPRINE HCL 10 MG
10 TABLET ORAL ONCE
Status: COMPLETED | OUTPATIENT
Start: 2019-02-18 | End: 2019-02-18

## 2019-02-18 RX ORDER — FENTANYL CITRATE 50 UG/ML
INJECTION, SOLUTION INTRAMUSCULAR; INTRAVENOUS PRN
Status: DISCONTINUED | OUTPATIENT
Start: 2019-02-18 | End: 2019-02-18 | Stop reason: SDUPTHER

## 2019-02-18 RX ORDER — SODIUM CHLORIDE, SODIUM LACTATE, POTASSIUM CHLORIDE, CALCIUM CHLORIDE 600; 310; 30; 20 MG/100ML; MG/100ML; MG/100ML; MG/100ML
INJECTION, SOLUTION INTRAVENOUS
Status: COMPLETED
Start: 2019-02-18 | End: 2019-02-18

## 2019-02-18 RX ORDER — MIDAZOLAM HYDROCHLORIDE 1 MG/ML
INJECTION INTRAMUSCULAR; INTRAVENOUS PRN
Status: DISCONTINUED | OUTPATIENT
Start: 2019-02-18 | End: 2019-02-18 | Stop reason: SDUPTHER

## 2019-02-18 RX ORDER — LIDOCAINE HYDROCHLORIDE 20 MG/ML
INJECTION, SOLUTION INFILTRATION; PERINEURAL PRN
Status: DISCONTINUED | OUTPATIENT
Start: 2019-02-18 | End: 2019-02-18 | Stop reason: SDUPTHER

## 2019-02-18 RX ORDER — HYDRALAZINE HYDROCHLORIDE 20 MG/ML
5 INJECTION INTRAMUSCULAR; INTRAVENOUS
Status: DISCONTINUED | OUTPATIENT
Start: 2019-02-18 | End: 2019-02-18 | Stop reason: HOSPADM

## 2019-02-18 RX ORDER — HYDROCODONE BITARTRATE AND ACETAMINOPHEN 5; 325 MG/1; MG/1
1 TABLET ORAL EVERY 4 HOURS PRN
Status: DISCONTINUED | OUTPATIENT
Start: 2019-02-18 | End: 2019-02-21 | Stop reason: HOSPADM

## 2019-02-18 RX ORDER — ONDANSETRON 2 MG/ML
4 INJECTION INTRAMUSCULAR; INTRAVENOUS EVERY 6 HOURS PRN
Status: DISCONTINUED | OUTPATIENT
Start: 2019-02-18 | End: 2019-02-19

## 2019-02-18 RX ORDER — TRANEXAMIC ACID 100 MG/ML
15 INJECTION, SOLUTION INTRAVENOUS ONCE
Status: DISCONTINUED | OUTPATIENT
Start: 2019-02-18 | End: 2019-02-18 | Stop reason: HOSPADM

## 2019-02-18 RX ORDER — CELECOXIB 100 MG/1
200 CAPSULE ORAL ONCE
Status: COMPLETED | OUTPATIENT
Start: 2019-02-18 | End: 2019-02-18

## 2019-02-18 RX ORDER — SODIUM CHLORIDE 0.9 % (FLUSH) 0.9 %
10 SYRINGE (ML) INJECTION PRN
Status: DISCONTINUED | OUTPATIENT
Start: 2019-02-18 | End: 2019-02-21 | Stop reason: HOSPADM

## 2019-02-18 RX ORDER — NICOTINE POLACRILEX 4 MG
15 LOZENGE BUCCAL PRN
Status: DISCONTINUED | OUTPATIENT
Start: 2019-02-18 | End: 2019-02-21 | Stop reason: HOSPADM

## 2019-02-18 RX ADMIN — Medication 2 G: at 17:43

## 2019-02-18 RX ADMIN — DEXTROSE MONOHYDRATE, SODIUM CHLORIDE, AND POTASSIUM CHLORIDE: 50; 4.5; 1.49 INJECTION, SOLUTION INTRAVENOUS at 12:39

## 2019-02-18 RX ADMIN — FENTANYL CITRATE 50 MCG: 50 INJECTION INTRAMUSCULAR; INTRAVENOUS at 08:58

## 2019-02-18 RX ADMIN — PROPOFOL 10 MG: 10 INJECTION, EMULSION INTRAVENOUS at 07:15

## 2019-02-18 RX ADMIN — MIDAZOLAM HYDROCHLORIDE 1 MG: 2 INJECTION, SOLUTION INTRAMUSCULAR; INTRAVENOUS at 08:55

## 2019-02-18 RX ADMIN — MIDAZOLAM HYDROCHLORIDE 1 MG: 2 INJECTION, SOLUTION INTRAMUSCULAR; INTRAVENOUS at 08:13

## 2019-02-18 RX ADMIN — HYDROCODONE BITARTRATE AND ACETAMINOPHEN 2 TABLET: 5; 325 TABLET ORAL at 18:46

## 2019-02-18 RX ADMIN — DOCUSATE SODIUM 100 MG: 100 CAPSULE, LIQUID FILLED ORAL at 12:41

## 2019-02-18 RX ADMIN — MIDAZOLAM HYDROCHLORIDE 1 MG: 2 INJECTION, SOLUTION INTRAMUSCULAR; INTRAVENOUS at 07:52

## 2019-02-18 RX ADMIN — HYDROCODONE BITARTRATE AND ACETAMINOPHEN 2 TABLET: 5; 325 TABLET ORAL at 12:41

## 2019-02-18 RX ADMIN — SODIUM CHLORIDE, POTASSIUM CHLORIDE, SODIUM LACTATE AND CALCIUM CHLORIDE: 600; 310; 30; 20 INJECTION, SOLUTION INTRAVENOUS at 08:15

## 2019-02-18 RX ADMIN — CYCLOBENZAPRINE HYDROCHLORIDE 10 MG: 10 TABLET, FILM COATED ORAL at 15:07

## 2019-02-18 RX ADMIN — CYCLOBENZAPRINE HYDROCHLORIDE 10 MG: 10 TABLET, FILM COATED ORAL at 06:27

## 2019-02-18 RX ADMIN — SODIUM CHLORIDE, POTASSIUM CHLORIDE, SODIUM LACTATE AND CALCIUM CHLORIDE: 600; 310; 30; 20 INJECTION, SOLUTION INTRAVENOUS at 06:28

## 2019-02-18 RX ADMIN — PROPOFOL 10 MG: 10 INJECTION, EMULSION INTRAVENOUS at 07:35

## 2019-02-18 RX ADMIN — DOCUSATE SODIUM 100 MG: 100 CAPSULE, LIQUID FILLED ORAL at 22:36

## 2019-02-18 RX ADMIN — ONDANSETRON 4 MG: 2 INJECTION INTRAMUSCULAR; INTRAVENOUS at 15:55

## 2019-02-18 RX ADMIN — CELECOXIB 100 MG: 100 CAPSULE ORAL at 22:36

## 2019-02-18 RX ADMIN — MIDAZOLAM HYDROCHLORIDE 2 MG: 2 INJECTION, SOLUTION INTRAMUSCULAR; INTRAVENOUS at 07:15

## 2019-02-18 RX ADMIN — FENTANYL CITRATE 50 MCG: 50 INJECTION INTRAMUSCULAR; INTRAVENOUS at 07:14

## 2019-02-18 RX ADMIN — CELECOXIB 200 MG: 100 CAPSULE ORAL at 06:27

## 2019-02-18 RX ADMIN — HYDROCODONE BITARTRATE AND ACETAMINOPHEN 2 TABLET: 5; 325 TABLET ORAL at 22:44

## 2019-02-18 RX ADMIN — MIDAZOLAM HYDROCHLORIDE 2 MG: 2 INJECTION, SOLUTION INTRAMUSCULAR; INTRAVENOUS at 07:05

## 2019-02-18 RX ADMIN — FENTANYL CITRATE 50 MCG: 50 INJECTION INTRAMUSCULAR; INTRAVENOUS at 08:15

## 2019-02-18 RX ADMIN — LIDOCAINE HYDROCHLORIDE 50 MG: 20 INJECTION, SOLUTION INFILTRATION; PERINEURAL at 07:15

## 2019-02-18 RX ADMIN — Medication 2 G: at 07:15

## 2019-02-18 RX ADMIN — TAMSULOSIN HYDROCHLORIDE 0.4 MG: 0.4 CAPSULE ORAL at 12:41

## 2019-02-18 RX ADMIN — FENTANYL CITRATE 50 MCG: 50 INJECTION INTRAMUSCULAR; INTRAVENOUS at 07:53

## 2019-02-18 RX ADMIN — PROPOFOL 20 MG: 10 INJECTION, EMULSION INTRAVENOUS at 07:50

## 2019-02-18 ASSESSMENT — PULMONARY FUNCTION TESTS
PIF_VALUE: 1
PIF_VALUE: 0
PIF_VALUE: 13
PIF_VALUE: 13
PIF_VALUE: 0
PIF_VALUE: 1
PIF_VALUE: 0
PIF_VALUE: 1
PIF_VALUE: 0
PIF_VALUE: 19
PIF_VALUE: 0
PIF_VALUE: 13
PIF_VALUE: 0
PIF_VALUE: 13
PIF_VALUE: 0
PIF_VALUE: 0
PIF_VALUE: 13
PIF_VALUE: 0
PIF_VALUE: 0
PIF_VALUE: 13
PIF_VALUE: 0

## 2019-02-18 ASSESSMENT — ENCOUNTER SYMPTOMS: SHORTNESS OF BREATH: 1

## 2019-02-18 ASSESSMENT — PAIN SCALES - GENERAL
PAINLEVEL_OUTOF10: 0
PAINLEVEL_OUTOF10: 7
PAINLEVEL_OUTOF10: 5
PAINLEVEL_OUTOF10: 0
PAINLEVEL_OUTOF10: 0
PAINLEVEL_OUTOF10: 7
PAINLEVEL_OUTOF10: 10
PAINLEVEL_OUTOF10: 7
PAINLEVEL_OUTOF10: 10

## 2019-02-18 ASSESSMENT — PAIN - FUNCTIONAL ASSESSMENT
PAIN_FUNCTIONAL_ASSESSMENT: INTOLERABLE, UNABLE TO DO ANY ACTIVE OR PASSIVE ACTIVITIES
PAIN_FUNCTIONAL_ASSESSMENT: 0-10

## 2019-02-18 ASSESSMENT — PAIN DESCRIPTION - ORIENTATION: ORIENTATION: LEFT

## 2019-02-18 ASSESSMENT — PAIN DESCRIPTION - LOCATION: LOCATION: KNEE

## 2019-02-19 LAB
ANION GAP SERPL CALCULATED.3IONS-SCNC: 7 MMOL/L (ref 3–16)
BASOPHILS ABSOLUTE: 0 K/UL (ref 0–0.2)
BASOPHILS RELATIVE PERCENT: 0.2 %
BUN BLDV-MCNC: 10 MG/DL (ref 7–20)
CALCIUM SERPL-MCNC: 8.2 MG/DL (ref 8.3–10.6)
CHLORIDE BLD-SCNC: 104 MMOL/L (ref 99–110)
CO2: 25 MMOL/L (ref 21–32)
CREAT SERPL-MCNC: 0.8 MG/DL (ref 0.8–1.3)
EOSINOPHILS ABSOLUTE: 0.1 K/UL (ref 0–0.6)
EOSINOPHILS RELATIVE PERCENT: 1.3 %
ESTIMATED AVERAGE GLUCOSE: 119.8 MG/DL
GFR AFRICAN AMERICAN: >60
GFR NON-AFRICAN AMERICAN: >60
GLUCOSE BLD-MCNC: 104 MG/DL (ref 70–99)
GLUCOSE BLD-MCNC: 110 MG/DL (ref 70–99)
GLUCOSE BLD-MCNC: 127 MG/DL (ref 70–99)
GLUCOSE BLD-MCNC: 132 MG/DL (ref 70–99)
GLUCOSE BLD-MCNC: 142 MG/DL (ref 70–99)
HBA1C MFR BLD: 5.8 %
HCT VFR BLD CALC: 39.9 % (ref 40.5–52.5)
HEMOGLOBIN: 13.8 G/DL (ref 13.5–17.5)
LYMPHOCYTES ABSOLUTE: 0.9 K/UL (ref 1–5.1)
LYMPHOCYTES RELATIVE PERCENT: 9.8 %
MCH RBC QN AUTO: 33 PG (ref 26–34)
MCHC RBC AUTO-ENTMCNC: 34.5 G/DL (ref 31–36)
MCV RBC AUTO: 95.7 FL (ref 80–100)
MONOCYTES ABSOLUTE: 1.1 K/UL (ref 0–1.3)
MONOCYTES RELATIVE PERCENT: 12.6 %
NEUTROPHILS ABSOLUTE: 6.7 K/UL (ref 1.7–7.7)
NEUTROPHILS RELATIVE PERCENT: 76.1 %
PDW BLD-RTO: 12.8 % (ref 12.4–15.4)
PERFORMED ON: ABNORMAL
PLATELET # BLD: 149 K/UL (ref 135–450)
PMV BLD AUTO: 9 FL (ref 5–10.5)
POTASSIUM REFLEX MAGNESIUM: 4.1 MMOL/L (ref 3.5–5.1)
RBC # BLD: 4.17 M/UL (ref 4.2–5.9)
SODIUM BLD-SCNC: 136 MMOL/L (ref 136–145)
WBC # BLD: 8.8 K/UL (ref 4–11)

## 2019-02-19 PROCEDURE — 97116 GAIT TRAINING THERAPY: CPT

## 2019-02-19 PROCEDURE — APPSS15 APP SPLIT SHARED TIME 0-15 MINUTES: Performed by: PHYSICIAN ASSISTANT

## 2019-02-19 PROCEDURE — 6370000000 HC RX 637 (ALT 250 FOR IP): Performed by: PHYSICIAN ASSISTANT

## 2019-02-19 PROCEDURE — 6360000002 HC RX W HCPCS: Performed by: ORTHOPAEDIC SURGERY

## 2019-02-19 PROCEDURE — 2580000003 HC RX 258: Performed by: ORTHOPAEDIC SURGERY

## 2019-02-19 PROCEDURE — 36415 COLL VENOUS BLD VENIPUNCTURE: CPT

## 2019-02-19 PROCEDURE — G8987 SELF CARE CURRENT STATUS: HCPCS

## 2019-02-19 PROCEDURE — 97110 THERAPEUTIC EXERCISES: CPT

## 2019-02-19 PROCEDURE — 2500000003 HC RX 250 WO HCPCS: Performed by: ORTHOPAEDIC SURGERY

## 2019-02-19 PROCEDURE — 85025 COMPLETE CBC W/AUTO DIFF WBC: CPT

## 2019-02-19 PROCEDURE — 6370000000 HC RX 637 (ALT 250 FOR IP): Performed by: NURSE PRACTITIONER

## 2019-02-19 PROCEDURE — 2580000003 HC RX 258: Performed by: PHYSICIAN ASSISTANT

## 2019-02-19 PROCEDURE — 80048 BASIC METABOLIC PNL TOTAL CA: CPT

## 2019-02-19 PROCEDURE — 97535 SELF CARE MNGMENT TRAINING: CPT

## 2019-02-19 PROCEDURE — 97530 THERAPEUTIC ACTIVITIES: CPT

## 2019-02-19 PROCEDURE — 6370000000 HC RX 637 (ALT 250 FOR IP): Performed by: ORTHOPAEDIC SURGERY

## 2019-02-19 PROCEDURE — G8988 SELF CARE GOAL STATUS: HCPCS

## 2019-02-19 PROCEDURE — 83036 HEMOGLOBIN GLYCOSYLATED A1C: CPT

## 2019-02-19 PROCEDURE — G8989 SELF CARE D/C STATUS: HCPCS

## 2019-02-19 PROCEDURE — 6370000000 HC RX 637 (ALT 250 FOR IP): Performed by: ANESTHESIOLOGY

## 2019-02-19 PROCEDURE — 1200000000 HC SEMI PRIVATE

## 2019-02-19 RX ORDER — 0.9 % SODIUM CHLORIDE 0.9 %
500 INTRAVENOUS SOLUTION INTRAVENOUS ONCE
Status: COMPLETED | OUTPATIENT
Start: 2019-02-19 | End: 2019-02-19

## 2019-02-19 RX ORDER — ONDANSETRON HYDROCHLORIDE 8 MG/1
8 TABLET, FILM COATED ORAL EVERY 8 HOURS PRN
Status: DISCONTINUED | OUTPATIENT
Start: 2019-02-19 | End: 2019-02-21 | Stop reason: HOSPADM

## 2019-02-19 RX ADMIN — ENOXAPARIN SODIUM 30 MG: 30 INJECTION SUBCUTANEOUS at 21:37

## 2019-02-19 RX ADMIN — TAMSULOSIN HYDROCHLORIDE 0.4 MG: 0.4 CAPSULE ORAL at 09:03

## 2019-02-19 RX ADMIN — HYDROCODONE BITARTRATE AND ACETAMINOPHEN 2 TABLET: 5; 325 TABLET ORAL at 09:02

## 2019-02-19 RX ADMIN — HYDROCODONE BITARTRATE AND ACETAMINOPHEN 2 TABLET: 5; 325 TABLET ORAL at 03:05

## 2019-02-19 RX ADMIN — Medication 2 G: at 02:34

## 2019-02-19 RX ADMIN — PANTOPRAZOLE SODIUM 40 MG: 40 TABLET, DELAYED RELEASE ORAL at 06:19

## 2019-02-19 RX ADMIN — HYDROCODONE BITARTRATE AND ACETAMINOPHEN 1 TABLET: 5; 325 TABLET ORAL at 21:51

## 2019-02-19 RX ADMIN — DOCUSATE SODIUM 100 MG: 100 CAPSULE, LIQUID FILLED ORAL at 09:03

## 2019-02-19 RX ADMIN — DEXTROSE MONOHYDRATE, SODIUM CHLORIDE, AND POTASSIUM CHLORIDE: 50; 4.5; 1.49 INJECTION, SOLUTION INTRAVENOUS at 03:07

## 2019-02-19 RX ADMIN — CELECOXIB 100 MG: 100 CAPSULE ORAL at 09:03

## 2019-02-19 RX ADMIN — SODIUM CHLORIDE, PRESERVATIVE FREE 10 ML: 5 INJECTION INTRAVENOUS at 09:03

## 2019-02-19 RX ADMIN — ENOXAPARIN SODIUM 30 MG: 30 INJECTION SUBCUTANEOUS at 09:02

## 2019-02-19 RX ADMIN — SODIUM CHLORIDE, PRESERVATIVE FREE 10 ML: 5 INJECTION INTRAVENOUS at 21:37

## 2019-02-19 RX ADMIN — HYDROCODONE BITARTRATE AND ACETAMINOPHEN 1 TABLET: 5; 325 TABLET ORAL at 16:44

## 2019-02-19 RX ADMIN — CELECOXIB 100 MG: 100 CAPSULE ORAL at 21:37

## 2019-02-19 RX ADMIN — METOPROLOL TARTRATE 25 MG: 25 TABLET ORAL at 09:03

## 2019-02-19 RX ADMIN — DOCUSATE SODIUM 100 MG: 100 CAPSULE, LIQUID FILLED ORAL at 21:37

## 2019-02-19 RX ADMIN — SODIUM CHLORIDE 500 ML: 9 INJECTION, SOLUTION INTRAVENOUS at 15:30

## 2019-02-19 ASSESSMENT — PAIN SCALES - GENERAL
PAINLEVEL_OUTOF10: 8
PAINLEVEL_OUTOF10: 5
PAINLEVEL_OUTOF10: 7
PAINLEVEL_OUTOF10: 4
PAINLEVEL_OUTOF10: 6
PAINLEVEL_OUTOF10: 7

## 2019-02-19 ASSESSMENT — PAIN DESCRIPTION - LOCATION: LOCATION: KNEE

## 2019-02-19 ASSESSMENT — PAIN DESCRIPTION - ORIENTATION: ORIENTATION: LEFT

## 2019-02-19 ASSESSMENT — PAIN DESCRIPTION - DESCRIPTORS: DESCRIPTORS: ACHING;THROBBING

## 2019-02-19 ASSESSMENT — PAIN DESCRIPTION - PAIN TYPE: TYPE: SURGICAL PAIN

## 2019-02-19 ASSESSMENT — PAIN - FUNCTIONAL ASSESSMENT: PAIN_FUNCTIONAL_ASSESSMENT: PREVENTS OR INTERFERES SOME ACTIVE ACTIVITIES AND ADLS

## 2019-02-19 ASSESSMENT — PAIN SCALES - WONG BAKER: WONGBAKER_NUMERICALRESPONSE: 6

## 2019-02-20 LAB
BASOPHILS ABSOLUTE: 0 K/UL (ref 0–0.2)
BASOPHILS RELATIVE PERCENT: 0.4 %
EOSINOPHILS ABSOLUTE: 0.4 K/UL (ref 0–0.6)
EOSINOPHILS RELATIVE PERCENT: 4.6 %
GLUCOSE BLD-MCNC: 111 MG/DL (ref 70–99)
GLUCOSE BLD-MCNC: 156 MG/DL (ref 70–99)
HCT VFR BLD CALC: 36.8 % (ref 40.5–52.5)
HEMOGLOBIN: 12.6 G/DL (ref 13.5–17.5)
LYMPHOCYTES ABSOLUTE: 1.5 K/UL (ref 1–5.1)
LYMPHOCYTES RELATIVE PERCENT: 17.6 %
MCH RBC QN AUTO: 32.8 PG (ref 26–34)
MCHC RBC AUTO-ENTMCNC: 34.2 G/DL (ref 31–36)
MCV RBC AUTO: 96.1 FL (ref 80–100)
MONOCYTES ABSOLUTE: 0.9 K/UL (ref 0–1.3)
MONOCYTES RELATIVE PERCENT: 11 %
NEUTROPHILS ABSOLUTE: 5.7 K/UL (ref 1.7–7.7)
NEUTROPHILS RELATIVE PERCENT: 66.4 %
PDW BLD-RTO: 13.2 % (ref 12.4–15.4)
PERFORMED ON: ABNORMAL
PERFORMED ON: ABNORMAL
PLATELET # BLD: 139 K/UL (ref 135–450)
PMV BLD AUTO: 8.9 FL (ref 5–10.5)
RBC # BLD: 3.83 M/UL (ref 4.2–5.9)
WBC # BLD: 8.6 K/UL (ref 4–11)

## 2019-02-20 PROCEDURE — 6370000000 HC RX 637 (ALT 250 FOR IP): Performed by: PHYSICIAN ASSISTANT

## 2019-02-20 PROCEDURE — 6360000002 HC RX W HCPCS: Performed by: ORTHOPAEDIC SURGERY

## 2019-02-20 PROCEDURE — 93971 EXTREMITY STUDY: CPT

## 2019-02-20 PROCEDURE — 6370000000 HC RX 637 (ALT 250 FOR IP): Performed by: ANESTHESIOLOGY

## 2019-02-20 PROCEDURE — 2580000003 HC RX 258: Performed by: ORTHOPAEDIC SURGERY

## 2019-02-20 PROCEDURE — 97530 THERAPEUTIC ACTIVITIES: CPT

## 2019-02-20 PROCEDURE — 6370000000 HC RX 637 (ALT 250 FOR IP): Performed by: NURSE PRACTITIONER

## 2019-02-20 PROCEDURE — 85025 COMPLETE CBC W/AUTO DIFF WBC: CPT

## 2019-02-20 PROCEDURE — 97116 GAIT TRAINING THERAPY: CPT

## 2019-02-20 PROCEDURE — APPSS30 APP SPLIT SHARED TIME 16-30 MINUTES: Performed by: PHYSICIAN ASSISTANT

## 2019-02-20 PROCEDURE — 1200000000 HC SEMI PRIVATE

## 2019-02-20 PROCEDURE — 36415 COLL VENOUS BLD VENIPUNCTURE: CPT

## 2019-02-20 PROCEDURE — 6370000000 HC RX 637 (ALT 250 FOR IP): Performed by: ORTHOPAEDIC SURGERY

## 2019-02-20 PROCEDURE — 97110 THERAPEUTIC EXERCISES: CPT

## 2019-02-20 PROCEDURE — 97535 SELF CARE MNGMENT TRAINING: CPT

## 2019-02-20 RX ORDER — NICOTINE 21 MG/24HR
1 PATCH, TRANSDERMAL 24 HOURS TRANSDERMAL DAILY
Status: DISCONTINUED | OUTPATIENT
Start: 2019-02-20 | End: 2019-02-21 | Stop reason: HOSPADM

## 2019-02-20 RX ORDER — BACITRACIN ZINC AND POLYMYXIN B SULFATE 500; 1000 [USP'U]/G; [USP'U]/G
OINTMENT TOPICAL DAILY
Status: DISCONTINUED | OUTPATIENT
Start: 2019-02-20 | End: 2019-02-21 | Stop reason: HOSPADM

## 2019-02-20 RX ADMIN — HYDROCODONE BITARTRATE AND ACETAMINOPHEN 2 TABLET: 5; 325 TABLET ORAL at 17:23

## 2019-02-20 RX ADMIN — ENOXAPARIN SODIUM 30 MG: 30 INJECTION SUBCUTANEOUS at 19:55

## 2019-02-20 RX ADMIN — DOCUSATE SODIUM 100 MG: 100 CAPSULE, LIQUID FILLED ORAL at 09:00

## 2019-02-20 RX ADMIN — DOCUSATE SODIUM 100 MG: 100 CAPSULE, LIQUID FILLED ORAL at 19:55

## 2019-02-20 RX ADMIN — CELECOXIB 100 MG: 100 CAPSULE ORAL at 09:00

## 2019-02-20 RX ADMIN — SODIUM CHLORIDE, PRESERVATIVE FREE 10 ML: 5 INJECTION INTRAVENOUS at 09:01

## 2019-02-20 RX ADMIN — SODIUM CHLORIDE, PRESERVATIVE FREE 10 ML: 5 INJECTION INTRAVENOUS at 19:56

## 2019-02-20 RX ADMIN — HYDROCODONE BITARTRATE AND ACETAMINOPHEN 2 TABLET: 5; 325 TABLET ORAL at 09:22

## 2019-02-20 RX ADMIN — BACITRACIN ZINC AND POLYMYXIN B SULFATE 14.2 G: at 17:18

## 2019-02-20 RX ADMIN — CYCLOBENZAPRINE HYDROCHLORIDE 10 MG: 10 TABLET, FILM COATED ORAL at 17:23

## 2019-02-20 RX ADMIN — TAMSULOSIN HYDROCHLORIDE 0.4 MG: 0.4 CAPSULE ORAL at 09:01

## 2019-02-20 RX ADMIN — CELECOXIB 100 MG: 100 CAPSULE ORAL at 19:55

## 2019-02-20 RX ADMIN — PANTOPRAZOLE SODIUM 40 MG: 40 TABLET, DELAYED RELEASE ORAL at 06:21

## 2019-02-20 RX ADMIN — ENOXAPARIN SODIUM 30 MG: 30 INJECTION SUBCUTANEOUS at 09:01

## 2019-02-20 RX ADMIN — HYDROCODONE BITARTRATE AND ACETAMINOPHEN 2 TABLET: 5; 325 TABLET ORAL at 21:31

## 2019-02-20 RX ADMIN — METOPROLOL TARTRATE 25 MG: 25 TABLET ORAL at 09:00

## 2019-02-20 ASSESSMENT — PAIN SCALES - WONG BAKER
WONGBAKER_NUMERICALRESPONSE: 6
WONGBAKER_NUMERICALRESPONSE: 6
WONGBAKER_NUMERICALRESPONSE: 2

## 2019-02-20 ASSESSMENT — PAIN SCALES - GENERAL
PAINLEVEL_OUTOF10: 8
PAINLEVEL_OUTOF10: 0
PAINLEVEL_OUTOF10: 4
PAINLEVEL_OUTOF10: 8
PAINLEVEL_OUTOF10: 4
PAINLEVEL_OUTOF10: 0
PAINLEVEL_OUTOF10: 8
PAINLEVEL_OUTOF10: 7
PAINLEVEL_OUTOF10: 6

## 2019-02-20 ASSESSMENT — PAIN DESCRIPTION - ORIENTATION
ORIENTATION: LEFT
ORIENTATION: LEFT

## 2019-02-20 ASSESSMENT — PAIN DESCRIPTION - LOCATION
LOCATION: KNEE
LOCATION: KNEE

## 2019-02-20 ASSESSMENT — PAIN DESCRIPTION - PAIN TYPE
TYPE: SURGICAL PAIN
TYPE: SURGICAL PAIN

## 2019-02-20 ASSESSMENT — PAIN DESCRIPTION - DESCRIPTORS: DESCRIPTORS: ACHING

## 2019-02-21 VITALS
DIASTOLIC BLOOD PRESSURE: 51 MMHG | HEART RATE: 82 BPM | OXYGEN SATURATION: 96 % | RESPIRATION RATE: 16 BRPM | TEMPERATURE: 98.2 F | BODY MASS INDEX: 29.82 KG/M2 | HEIGHT: 67 IN | WEIGHT: 190 LBS | SYSTOLIC BLOOD PRESSURE: 101 MMHG

## 2019-02-21 LAB
BASOPHILS ABSOLUTE: 0 K/UL (ref 0–0.2)
BASOPHILS RELATIVE PERCENT: 0.5 %
EOSINOPHILS ABSOLUTE: 0.6 K/UL (ref 0–0.6)
EOSINOPHILS RELATIVE PERCENT: 8.1 %
HCT VFR BLD CALC: 34.6 % (ref 40.5–52.5)
HEMOGLOBIN: 12.1 G/DL (ref 13.5–17.5)
LYMPHOCYTES ABSOLUTE: 1.6 K/UL (ref 1–5.1)
LYMPHOCYTES RELATIVE PERCENT: 23.9 %
MCH RBC QN AUTO: 33.6 PG (ref 26–34)
MCHC RBC AUTO-ENTMCNC: 35 G/DL (ref 31–36)
MCV RBC AUTO: 95.9 FL (ref 80–100)
MONOCYTES ABSOLUTE: 0.7 K/UL (ref 0–1.3)
MONOCYTES RELATIVE PERCENT: 10.7 %
NEUTROPHILS ABSOLUTE: 3.9 K/UL (ref 1.7–7.7)
NEUTROPHILS RELATIVE PERCENT: 56.8 %
PDW BLD-RTO: 12.8 % (ref 12.4–15.4)
PLATELET # BLD: 153 K/UL (ref 135–450)
PMV BLD AUTO: 9.1 FL (ref 5–10.5)
RBC # BLD: 3.61 M/UL (ref 4.2–5.9)
WBC # BLD: 6.8 K/UL (ref 4–11)

## 2019-02-21 PROCEDURE — 6370000000 HC RX 637 (ALT 250 FOR IP): Performed by: NURSE PRACTITIONER

## 2019-02-21 PROCEDURE — 97110 THERAPEUTIC EXERCISES: CPT

## 2019-02-21 PROCEDURE — 6370000000 HC RX 637 (ALT 250 FOR IP): Performed by: PHYSICIAN ASSISTANT

## 2019-02-21 PROCEDURE — G8987 SELF CARE CURRENT STATUS: HCPCS

## 2019-02-21 PROCEDURE — 2580000003 HC RX 258: Performed by: ORTHOPAEDIC SURGERY

## 2019-02-21 PROCEDURE — 85025 COMPLETE CBC W/AUTO DIFF WBC: CPT

## 2019-02-21 PROCEDURE — 36415 COLL VENOUS BLD VENIPUNCTURE: CPT

## 2019-02-21 PROCEDURE — APPSS45 APP SPLIT SHARED TIME 31-45 MINUTES: Performed by: PHYSICIAN ASSISTANT

## 2019-02-21 PROCEDURE — 97535 SELF CARE MNGMENT TRAINING: CPT

## 2019-02-21 PROCEDURE — G8989 SELF CARE D/C STATUS: HCPCS

## 2019-02-21 PROCEDURE — G8988 SELF CARE GOAL STATUS: HCPCS

## 2019-02-21 PROCEDURE — 6370000000 HC RX 637 (ALT 250 FOR IP): Performed by: ORTHOPAEDIC SURGERY

## 2019-02-21 PROCEDURE — 6370000000 HC RX 637 (ALT 250 FOR IP): Performed by: ANESTHESIOLOGY

## 2019-02-21 PROCEDURE — 6360000002 HC RX W HCPCS: Performed by: ORTHOPAEDIC SURGERY

## 2019-02-21 RX ORDER — CYCLOBENZAPRINE HCL 10 MG
10 TABLET ORAL 3 TIMES DAILY PRN
Qty: 30 TABLET | Refills: 0 | Status: SHIPPED | OUTPATIENT
Start: 2019-02-21 | End: 2019-03-03

## 2019-02-21 RX ORDER — HYDROCODONE BITARTRATE AND ACETAMINOPHEN 5; 325 MG/1; MG/1
1-2 TABLET ORAL EVERY 4 HOURS PRN
Qty: 40 TABLET | Refills: 0 | Status: SHIPPED | OUTPATIENT
Start: 2019-02-21 | End: 2019-02-28

## 2019-02-21 RX ORDER — NICOTINE 21 MG/24HR
1 PATCH, TRANSDERMAL 24 HOURS TRANSDERMAL DAILY
Qty: 30 PATCH | Refills: 0 | Status: SHIPPED | OUTPATIENT
Start: 2019-02-22 | End: 2019-03-11

## 2019-02-21 RX ORDER — BACITRACIN ZINC AND POLYMYXIN B SULFATE 500; 1000 [USP'U]/G; [USP'U]/G
OINTMENT TOPICAL
Qty: 1 TUBE | Refills: 0 | Status: SHIPPED | OUTPATIENT
Start: 2019-02-21 | End: 2019-02-28

## 2019-02-21 RX ADMIN — HYDROCODONE BITARTRATE AND ACETAMINOPHEN 2 TABLET: 5; 325 TABLET ORAL at 09:44

## 2019-02-21 RX ADMIN — HYDROCODONE BITARTRATE AND ACETAMINOPHEN 2 TABLET: 5; 325 TABLET ORAL at 05:52

## 2019-02-21 RX ADMIN — DOCUSATE SODIUM 100 MG: 100 CAPSULE, LIQUID FILLED ORAL at 09:44

## 2019-02-21 RX ADMIN — ENOXAPARIN SODIUM 30 MG: 30 INJECTION SUBCUTANEOUS at 09:44

## 2019-02-21 RX ADMIN — TAMSULOSIN HYDROCHLORIDE 0.4 MG: 0.4 CAPSULE ORAL at 09:45

## 2019-02-21 RX ADMIN — SODIUM CHLORIDE, PRESERVATIVE FREE 10 ML: 5 INJECTION INTRAVENOUS at 09:45

## 2019-02-21 RX ADMIN — PANTOPRAZOLE SODIUM 40 MG: 40 TABLET, DELAYED RELEASE ORAL at 05:52

## 2019-02-21 RX ADMIN — METOPROLOL TARTRATE 25 MG: 25 TABLET ORAL at 09:44

## 2019-02-21 RX ADMIN — HYDROCODONE BITARTRATE AND ACETAMINOPHEN 2 TABLET: 5; 325 TABLET ORAL at 14:52

## 2019-02-21 RX ADMIN — CELECOXIB 100 MG: 100 CAPSULE ORAL at 09:45

## 2019-02-21 RX ADMIN — CYCLOBENZAPRINE HYDROCHLORIDE 10 MG: 10 TABLET, FILM COATED ORAL at 09:44

## 2019-02-21 ASSESSMENT — PAIN DESCRIPTION - PAIN TYPE: TYPE: SURGICAL PAIN

## 2019-02-21 ASSESSMENT — PAIN SCALES - GENERAL
PAINLEVEL_OUTOF10: 5
PAINLEVEL_OUTOF10: 6
PAINLEVEL_OUTOF10: 7
PAINLEVEL_OUTOF10: 7
PAINLEVEL_OUTOF10: 5

## 2019-02-21 ASSESSMENT — PAIN DESCRIPTION - LOCATION
LOCATION: KNEE
LOCATION: KNEE

## 2019-02-21 ASSESSMENT — PAIN DESCRIPTION - ORIENTATION
ORIENTATION: LEFT
ORIENTATION: LEFT

## 2019-02-21 ASSESSMENT — PAIN SCALES - WONG BAKER: WONGBAKER_NUMERICALRESPONSE: 2

## 2019-02-21 ASSESSMENT — PAIN DESCRIPTION - DESCRIPTORS: DESCRIPTORS: ACHING

## 2019-03-05 DIAGNOSIS — M25.562 LEFT KNEE PAIN, UNSPECIFIED CHRONICITY: Primary | ICD-10-CM

## 2019-03-11 ENCOUNTER — HOSPITAL ENCOUNTER (OUTPATIENT)
Age: 64
Setting detail: OBSERVATION
Discharge: HOME | End: 2019-03-12
Attending: EMERGENCY MEDICINE | Admitting: INTERNAL MEDICINE
Payer: MEDICARE

## 2019-03-11 ENCOUNTER — APPOINTMENT (OUTPATIENT)
Dept: CT IMAGING | Age: 64
End: 2019-03-11
Payer: MEDICARE

## 2019-03-11 DIAGNOSIS — R42 LIGHT HEADED: ICD-10-CM

## 2019-03-11 DIAGNOSIS — R06.02 SHORTNESS OF BREATH: Primary | ICD-10-CM

## 2019-03-11 PROBLEM — I10 HTN (HYPERTENSION): Status: ACTIVE | Noted: 2019-03-11

## 2019-03-11 PROBLEM — M25.562 KNEE PAIN, LEFT: Status: ACTIVE | Noted: 2019-03-11

## 2019-03-11 LAB
A/G RATIO: 1.6 (ref 1.1–2.2)
ALBUMIN SERPL-MCNC: 4.5 G/DL (ref 3.4–5)
ALP BLD-CCNC: 85 U/L (ref 40–129)
ALT SERPL-CCNC: 23 U/L (ref 10–40)
ANION GAP SERPL CALCULATED.3IONS-SCNC: 13 MMOL/L (ref 3–16)
AST SERPL-CCNC: 17 U/L (ref 15–37)
BILIRUB SERPL-MCNC: 0.9 MG/DL (ref 0–1)
BUN BLDV-MCNC: 13 MG/DL (ref 7–20)
CALCIUM SERPL-MCNC: 9.4 MG/DL (ref 8.3–10.6)
CHLORIDE BLD-SCNC: 100 MMOL/L (ref 99–110)
CO2: 26 MMOL/L (ref 21–32)
CREAT SERPL-MCNC: 0.7 MG/DL (ref 0.8–1.3)
GFR AFRICAN AMERICAN: >60
GFR NON-AFRICAN AMERICAN: >60
GLOBULIN: 2.9 G/DL
GLUCOSE BLD-MCNC: 94 MG/DL (ref 70–99)
HCT VFR BLD CALC: 44.7 % (ref 40.5–52.5)
HEMOGLOBIN: 15.4 G/DL (ref 13.5–17.5)
INR BLD: 1.11 (ref 0.86–1.14)
MCH RBC QN AUTO: 33 PG (ref 26–34)
MCHC RBC AUTO-ENTMCNC: 34.4 G/DL (ref 31–36)
MCV RBC AUTO: 95.9 FL (ref 80–100)
PDW BLD-RTO: 13 % (ref 12.4–15.4)
PLATELET # BLD: 329 K/UL (ref 135–450)
PMV BLD AUTO: 8.1 FL (ref 5–10.5)
POTASSIUM SERPL-SCNC: 4.1 MMOL/L (ref 3.5–5.1)
PROTHROMBIN TIME: 12.6 SEC (ref 9.8–13)
RBC # BLD: 4.66 M/UL (ref 4.2–5.9)
SODIUM BLD-SCNC: 139 MMOL/L (ref 136–145)
TOTAL PROTEIN: 7.4 G/DL (ref 6.4–8.2)
TROPONIN: <0.01 NG/ML
WBC # BLD: 7.5 K/UL (ref 4–11)

## 2019-03-11 PROCEDURE — G0378 HOSPITAL OBSERVATION PER HR: HCPCS

## 2019-03-11 PROCEDURE — 93005 ELECTROCARDIOGRAM TRACING: CPT | Performed by: EMERGENCY MEDICINE

## 2019-03-11 PROCEDURE — 99406 BEHAV CHNG SMOKING 3-10 MIN: CPT

## 2019-03-11 PROCEDURE — 85610 PROTHROMBIN TIME: CPT

## 2019-03-11 PROCEDURE — 6370000000 HC RX 637 (ALT 250 FOR IP): Performed by: NURSE PRACTITIONER

## 2019-03-11 PROCEDURE — 2580000003 HC RX 258: Performed by: INTERNAL MEDICINE

## 2019-03-11 PROCEDURE — 94640 AIRWAY INHALATION TREATMENT: CPT

## 2019-03-11 PROCEDURE — 6370000000 HC RX 637 (ALT 250 FOR IP): Performed by: EMERGENCY MEDICINE

## 2019-03-11 PROCEDURE — 80053 COMPREHEN METABOLIC PANEL: CPT

## 2019-03-11 PROCEDURE — 6360000004 HC RX CONTRAST MEDICATION: Performed by: NURSE PRACTITIONER

## 2019-03-11 PROCEDURE — 36415 COLL VENOUS BLD VENIPUNCTURE: CPT

## 2019-03-11 PROCEDURE — 84484 ASSAY OF TROPONIN QUANT: CPT

## 2019-03-11 PROCEDURE — 85027 COMPLETE CBC AUTOMATED: CPT

## 2019-03-11 PROCEDURE — 93005 ELECTROCARDIOGRAM TRACING: CPT | Performed by: NURSE PRACTITIONER

## 2019-03-11 PROCEDURE — 99285 EMERGENCY DEPT VISIT HI MDM: CPT

## 2019-03-11 PROCEDURE — 6370000000 HC RX 637 (ALT 250 FOR IP): Performed by: INTERNAL MEDICINE

## 2019-03-11 PROCEDURE — 93971 EXTREMITY STUDY: CPT

## 2019-03-11 PROCEDURE — 71260 CT THORAX DX C+: CPT

## 2019-03-11 RX ORDER — NITROGLYCERIN 0.4 MG/1
0.4 TABLET SUBLINGUAL EVERY 5 MIN PRN
Status: DISCONTINUED | OUTPATIENT
Start: 2019-03-11 | End: 2019-03-12 | Stop reason: HOSPADM

## 2019-03-11 RX ORDER — PANTOPRAZOLE SODIUM 40 MG/1
40 TABLET, DELAYED RELEASE ORAL
Status: DISCONTINUED | OUTPATIENT
Start: 2019-03-12 | End: 2019-03-12 | Stop reason: HOSPADM

## 2019-03-11 RX ORDER — ASPIRIN 81 MG/1
81 TABLET, CHEWABLE ORAL DAILY
Status: DISCONTINUED | OUTPATIENT
Start: 2019-03-12 | End: 2019-03-12 | Stop reason: HOSPADM

## 2019-03-11 RX ORDER — SODIUM CHLORIDE 0.9 % (FLUSH) 0.9 %
10 SYRINGE (ML) INJECTION PRN
Status: DISCONTINUED | OUTPATIENT
Start: 2019-03-11 | End: 2019-03-12 | Stop reason: HOSPADM

## 2019-03-11 RX ORDER — SODIUM CHLORIDE 0.9 % (FLUSH) 0.9 %
10 SYRINGE (ML) INJECTION EVERY 12 HOURS SCHEDULED
Status: DISCONTINUED | OUTPATIENT
Start: 2019-03-11 | End: 2019-03-12 | Stop reason: HOSPADM

## 2019-03-11 RX ORDER — TRAMADOL HYDROCHLORIDE 50 MG/1
50 TABLET ORAL EVERY 6 HOURS PRN
COMMUNITY
End: 2019-08-13

## 2019-03-11 RX ORDER — TAMSULOSIN HYDROCHLORIDE 0.4 MG/1
0.4 CAPSULE ORAL DAILY
Status: DISCONTINUED | OUTPATIENT
Start: 2019-03-11 | End: 2019-03-12 | Stop reason: HOSPADM

## 2019-03-11 RX ORDER — IPRATROPIUM BROMIDE AND ALBUTEROL SULFATE 2.5; .5 MG/3ML; MG/3ML
1 SOLUTION RESPIRATORY (INHALATION) ONCE
Status: COMPLETED | OUTPATIENT
Start: 2019-03-11 | End: 2019-03-11

## 2019-03-11 RX ORDER — TRAMADOL HYDROCHLORIDE 50 MG/1
50 TABLET ORAL EVERY 6 HOURS PRN
Status: DISCONTINUED | OUTPATIENT
Start: 2019-03-11 | End: 2019-03-12 | Stop reason: HOSPADM

## 2019-03-11 RX ORDER — ONDANSETRON 2 MG/ML
4 INJECTION INTRAMUSCULAR; INTRAVENOUS EVERY 6 HOURS PRN
Status: DISCONTINUED | OUTPATIENT
Start: 2019-03-11 | End: 2019-03-12 | Stop reason: HOSPADM

## 2019-03-11 RX ORDER — TRAMADOL HYDROCHLORIDE 50 MG/1
100 TABLET ORAL EVERY 6 HOURS PRN
Status: DISCONTINUED | OUTPATIENT
Start: 2019-03-11 | End: 2019-03-12 | Stop reason: HOSPADM

## 2019-03-11 RX ORDER — PANTOPRAZOLE SODIUM 40 MG/1
40 TABLET, DELAYED RELEASE ORAL ONCE
Status: COMPLETED | OUTPATIENT
Start: 2019-03-11 | End: 2019-03-11

## 2019-03-11 RX ADMIN — IPRATROPIUM BROMIDE AND ALBUTEROL SULFATE 1 AMPULE: .5; 3 SOLUTION RESPIRATORY (INHALATION) at 15:53

## 2019-03-11 RX ADMIN — METOPROLOL TARTRATE 25 MG: 25 TABLET ORAL at 21:07

## 2019-03-11 RX ADMIN — IOPAMIDOL 75 ML: 755 INJECTION, SOLUTION INTRAVENOUS at 13:14

## 2019-03-11 RX ADMIN — TAMSULOSIN HYDROCHLORIDE 0.4 MG: 0.4 CAPSULE ORAL at 21:07

## 2019-03-11 RX ADMIN — SODIUM CHLORIDE, PRESERVATIVE FREE 10 ML: 5 INJECTION INTRAVENOUS at 21:07

## 2019-03-11 RX ADMIN — PANTOPRAZOLE SODIUM 40 MG: 40 TABLET, DELAYED RELEASE ORAL at 21:07

## 2019-03-11 RX ADMIN — TRAMADOL HYDROCHLORIDE 50 MG: 50 TABLET, FILM COATED ORAL at 21:07

## 2019-03-11 ASSESSMENT — PAIN DESCRIPTION - ORIENTATION
ORIENTATION: LEFT
ORIENTATION: LEFT
ORIENTATION: LEFT;UPPER

## 2019-03-11 ASSESSMENT — PAIN DESCRIPTION - ONSET: ONSET: ON-GOING

## 2019-03-11 ASSESSMENT — HEART SCORE: ECG: 1

## 2019-03-11 ASSESSMENT — PAIN SCALES - GENERAL
PAINLEVEL_OUTOF10: 5
PAINLEVEL_OUTOF10: 7
PAINLEVEL_OUTOF10: 5
PAINLEVEL_OUTOF10: 5

## 2019-03-11 ASSESSMENT — PAIN DESCRIPTION - PAIN TYPE
TYPE: ACUTE PAIN

## 2019-03-11 ASSESSMENT — PAIN DESCRIPTION - FREQUENCY
FREQUENCY: CONTINUOUS
FREQUENCY: INTERMITTENT

## 2019-03-11 ASSESSMENT — PAIN DESCRIPTION - LOCATION
LOCATION: LEG
LOCATION: LEG

## 2019-03-11 ASSESSMENT — PAIN DESCRIPTION - DESCRIPTORS
DESCRIPTORS: ACHING
DESCRIPTORS: BURNING;ACHING;CRAMPING

## 2019-03-11 ASSESSMENT — PAIN DESCRIPTION - PROGRESSION: CLINICAL_PROGRESSION: NOT CHANGED

## 2019-03-12 VITALS
TEMPERATURE: 97.6 F | RESPIRATION RATE: 14 BRPM | SYSTOLIC BLOOD PRESSURE: 124 MMHG | OXYGEN SATURATION: 92 % | HEIGHT: 67 IN | BODY MASS INDEX: 29.6 KG/M2 | DIASTOLIC BLOOD PRESSURE: 82 MMHG | HEART RATE: 97 BPM | WEIGHT: 188.6 LBS

## 2019-03-12 LAB
CHOLESTEROL, TOTAL: 175 MG/DL (ref 0–199)
HCT VFR BLD CALC: 41.7 % (ref 40.5–52.5)
HDLC SERPL-MCNC: 29 MG/DL (ref 40–60)
HEMOGLOBIN: 14.5 G/DL (ref 13.5–17.5)
LDL CHOLESTEROL CALCULATED: 120 MG/DL
MCH RBC QN AUTO: 33.1 PG (ref 26–34)
MCHC RBC AUTO-ENTMCNC: 34.7 G/DL (ref 31–36)
MCV RBC AUTO: 95.3 FL (ref 80–100)
PDW BLD-RTO: 13.3 % (ref 12.4–15.4)
PLATELET # BLD: 305 K/UL (ref 135–450)
PMV BLD AUTO: 8.2 FL (ref 5–10.5)
RBC # BLD: 4.38 M/UL (ref 4.2–5.9)
TRIGL SERPL-MCNC: 131 MG/DL (ref 0–150)
VLDLC SERPL CALC-MCNC: 26 MG/DL
WBC # BLD: 6.5 K/UL (ref 4–11)

## 2019-03-12 PROCEDURE — G0378 HOSPITAL OBSERVATION PER HR: HCPCS

## 2019-03-12 PROCEDURE — 6370000000 HC RX 637 (ALT 250 FOR IP): Performed by: NURSE PRACTITIONER

## 2019-03-12 PROCEDURE — 85027 COMPLETE CBC AUTOMATED: CPT

## 2019-03-12 PROCEDURE — 36415 COLL VENOUS BLD VENIPUNCTURE: CPT

## 2019-03-12 PROCEDURE — 6370000000 HC RX 637 (ALT 250 FOR IP): Performed by: INTERNAL MEDICINE

## 2019-03-12 PROCEDURE — 2580000003 HC RX 258: Performed by: INTERNAL MEDICINE

## 2019-03-12 PROCEDURE — 80061 LIPID PANEL: CPT

## 2019-03-12 RX ADMIN — TRAMADOL HYDROCHLORIDE 100 MG: 50 TABLET, FILM COATED ORAL at 03:16

## 2019-03-12 RX ADMIN — TRAMADOL HYDROCHLORIDE 100 MG: 50 TABLET, FILM COATED ORAL at 10:09

## 2019-03-12 RX ADMIN — ASPIRIN 81 MG 81 MG: 81 TABLET ORAL at 10:08

## 2019-03-12 RX ADMIN — SODIUM CHLORIDE, PRESERVATIVE FREE 10 ML: 5 INJECTION INTRAVENOUS at 10:10

## 2019-03-12 RX ADMIN — TAMSULOSIN HYDROCHLORIDE 0.4 MG: 0.4 CAPSULE ORAL at 10:08

## 2019-03-12 ASSESSMENT — PAIN SCALES - GENERAL
PAINLEVEL_OUTOF10: 7

## 2019-03-12 ASSESSMENT — PAIN DESCRIPTION - PROGRESSION
CLINICAL_PROGRESSION: NOT CHANGED

## 2019-03-12 ASSESSMENT — PAIN DESCRIPTION - DESCRIPTORS: DESCRIPTORS: ACHING;CRAMPING

## 2019-03-12 ASSESSMENT — PAIN DESCRIPTION - PAIN TYPE
TYPE: ACUTE PAIN
TYPE: ACUTE PAIN

## 2019-03-12 ASSESSMENT — PAIN DESCRIPTION - LOCATION
LOCATION: KNEE
LOCATION: KNEE

## 2019-03-12 ASSESSMENT — PAIN DESCRIPTION - FREQUENCY: FREQUENCY: INTERMITTENT

## 2019-03-12 ASSESSMENT — PAIN DESCRIPTION - ORIENTATION
ORIENTATION: LEFT
ORIENTATION: LEFT

## 2019-03-12 ASSESSMENT — PAIN DESCRIPTION - ONSET: ONSET: ON-GOING

## 2019-03-13 LAB
EKG ATRIAL RATE: 94 BPM
EKG ATRIAL RATE: 97 BPM
EKG DIAGNOSIS: NORMAL
EKG DIAGNOSIS: NORMAL
EKG P AXIS: 58 DEGREES
EKG P AXIS: 58 DEGREES
EKG P-R INTERVAL: 138 MS
EKG P-R INTERVAL: 140 MS
EKG Q-T INTERVAL: 320 MS
EKG Q-T INTERVAL: 334 MS
EKG QRS DURATION: 78 MS
EKG QRS DURATION: 88 MS
EKG QTC CALCULATION (BAZETT): 406 MS
EKG QTC CALCULATION (BAZETT): 417 MS
EKG R AXIS: 38 DEGREES
EKG R AXIS: 63 DEGREES
EKG T AXIS: 12 DEGREES
EKG T AXIS: 35 DEGREES
EKG VENTRICULAR RATE: 94 BPM
EKG VENTRICULAR RATE: 97 BPM

## 2019-03-13 PROCEDURE — 93010 ELECTROCARDIOGRAM REPORT: CPT | Performed by: INTERNAL MEDICINE

## 2019-03-21 ENCOUNTER — OFFICE VISIT (OUTPATIENT)
Dept: CARDIOLOGY CLINIC | Age: 64
End: 2019-03-21
Payer: MEDICARE

## 2019-03-21 ENCOUNTER — TELEPHONE (OUTPATIENT)
Dept: CARDIOLOGY CLINIC | Age: 64
End: 2019-03-21

## 2019-03-21 VITALS
DIASTOLIC BLOOD PRESSURE: 72 MMHG | BODY MASS INDEX: 29.85 KG/M2 | HEIGHT: 67 IN | SYSTOLIC BLOOD PRESSURE: 114 MMHG | HEART RATE: 90 BPM | WEIGHT: 190.2 LBS

## 2019-03-21 DIAGNOSIS — R00.0 TACHYCARDIA: ICD-10-CM

## 2019-03-21 DIAGNOSIS — M25.562 LEFT KNEE PAIN, UNSPECIFIED CHRONICITY: Primary | ICD-10-CM

## 2019-03-21 DIAGNOSIS — G47.33 OSA (OBSTRUCTIVE SLEEP APNEA): ICD-10-CM

## 2019-03-21 DIAGNOSIS — R06.02 SOB (SHORTNESS OF BREATH): ICD-10-CM

## 2019-03-21 DIAGNOSIS — R00.2 HEART PALPITATIONS: Primary | ICD-10-CM

## 2019-03-21 PROCEDURE — 93000 ELECTROCARDIOGRAM COMPLETE: CPT | Performed by: INTERNAL MEDICINE

## 2019-03-21 PROCEDURE — 93225 XTRNL ECG REC<48 HRS REC: CPT | Performed by: INTERNAL MEDICINE

## 2019-03-21 PROCEDURE — 99205 OFFICE O/P NEW HI 60 MIN: CPT | Performed by: INTERNAL MEDICINE

## 2019-03-21 RX ORDER — CYCLOBENZAPRINE HCL 10 MG
10 TABLET ORAL 3 TIMES DAILY
COMMUNITY
Start: 2019-03-21 | End: 2019-08-13

## 2019-03-22 ENCOUNTER — OFFICE VISIT (OUTPATIENT)
Dept: PULMONOLOGY | Age: 64
End: 2019-03-22
Payer: MEDICARE

## 2019-03-22 VITALS
OXYGEN SATURATION: 98 % | TEMPERATURE: 97.8 F | HEIGHT: 67 IN | DIASTOLIC BLOOD PRESSURE: 73 MMHG | SYSTOLIC BLOOD PRESSURE: 117 MMHG | WEIGHT: 189 LBS | HEART RATE: 85 BPM | BODY MASS INDEX: 29.66 KG/M2

## 2019-03-22 DIAGNOSIS — R29.818 SUSPECTED SLEEP APNEA: ICD-10-CM

## 2019-03-22 DIAGNOSIS — G47.30 OBSERVED SLEEP APNEA: ICD-10-CM

## 2019-03-22 DIAGNOSIS — E66.9 CLASS 1 OBESITY WITH SERIOUS COMORBIDITY AND BODY MASS INDEX (BMI) OF 30.0 TO 30.9 IN ADULT, UNSPECIFIED OBESITY TYPE: Primary | ICD-10-CM

## 2019-03-22 DIAGNOSIS — F17.200 CURRENT SMOKER: ICD-10-CM

## 2019-03-22 DIAGNOSIS — R06.02 SOB (SHORTNESS OF BREATH): ICD-10-CM

## 2019-03-22 PROCEDURE — 4004F PT TOBACCO SCREEN RCVD TLK: CPT | Performed by: INTERNAL MEDICINE

## 2019-03-22 PROCEDURE — 1101F PT FALLS ASSESS-DOCD LE1/YR: CPT | Performed by: INTERNAL MEDICINE

## 2019-03-22 PROCEDURE — 99214 OFFICE O/P EST MOD 30 MIN: CPT | Performed by: INTERNAL MEDICINE

## 2019-03-22 PROCEDURE — 1123F ACP DISCUSS/DSCN MKR DOCD: CPT | Performed by: INTERNAL MEDICINE

## 2019-03-22 PROCEDURE — 3017F COLORECTAL CA SCREEN DOC REV: CPT | Performed by: INTERNAL MEDICINE

## 2019-03-22 PROCEDURE — G8427 DOCREV CUR MEDS BY ELIG CLIN: HCPCS | Performed by: INTERNAL MEDICINE

## 2019-03-22 PROCEDURE — G8417 CALC BMI ABV UP PARAM F/U: HCPCS | Performed by: INTERNAL MEDICINE

## 2019-03-22 PROCEDURE — 4040F PNEUMOC VAC/ADMIN/RCVD: CPT | Performed by: INTERNAL MEDICINE

## 2019-03-22 PROCEDURE — 1111F DSCHRG MED/CURRENT MED MERGE: CPT | Performed by: INTERNAL MEDICINE

## 2019-03-22 PROCEDURE — G8484 FLU IMMUNIZE NO ADMIN: HCPCS | Performed by: INTERNAL MEDICINE

## 2019-03-22 RX ORDER — LEVALBUTEROL TARTRATE 45 UG/1
2 AEROSOL, METERED ORAL EVERY 4 HOURS PRN
Qty: 1 INHALER | Refills: 3 | Status: SHIPPED | OUTPATIENT
Start: 2019-03-22 | End: 2019-05-22

## 2019-03-22 ASSESSMENT — SLEEP AND FATIGUE QUESTIONNAIRES
NECK CIRCUMFERENCE (INCHES): 16.75
HOW LIKELY ARE YOU TO NOD OFF OR FALL ASLEEP WHILE SITTING QUIETLY AFTER LUNCH WITHOUT ALCOHOL: 3
ESS TOTAL SCORE: 6
HOW LIKELY ARE YOU TO NOD OFF OR FALL ASLEEP WHILE SITTING AND TALKING TO SOMEONE: 0
HOW LIKELY ARE YOU TO NOD OFF OR FALL ASLEEP WHILE SITTING AND READING: 0
HOW LIKELY ARE YOU TO NOD OFF OR FALL ASLEEP WHILE LYING DOWN TO REST IN THE AFTERNOON WHEN CIRCUMSTANCES PERMIT: 3
HOW LIKELY ARE YOU TO NOD OFF OR FALL ASLEEP WHILE SITTING INACTIVE IN A PUBLIC PLACE: 0
HOW LIKELY ARE YOU TO NOD OFF OR FALL ASLEEP WHILE WATCHING TV: 0
HOW LIKELY ARE YOU TO NOD OFF OR FALL ASLEEP WHEN YOU ARE A PASSENGER IN A CAR FOR AN HOUR WITHOUT A BREAK: 0
HOW LIKELY ARE YOU TO NOD OFF OR FALL ASLEEP IN A CAR, WHILE STOPPED FOR A FEW MINUTES IN TRAFFIC: 0

## 2019-03-27 ENCOUNTER — HOSPITAL ENCOUNTER (OUTPATIENT)
Dept: PULMONOLOGY | Age: 64
Discharge: HOME | End: 2019-03-27
Payer: MEDICARE

## 2019-03-27 ENCOUNTER — HOSPITAL ENCOUNTER (OUTPATIENT)
Dept: NON INVASIVE DIAGNOSTICS | Age: 64
Discharge: HOME | End: 2019-03-27
Payer: MEDICARE

## 2019-03-27 VITALS — OXYGEN SATURATION: 97 %

## 2019-03-27 DIAGNOSIS — R29.818 SUSPECTED SLEEP APNEA: ICD-10-CM

## 2019-03-27 DIAGNOSIS — R06.02 SOB (SHORTNESS OF BREATH): ICD-10-CM

## 2019-03-27 DIAGNOSIS — E66.9 CLASS 1 OBESITY WITH SERIOUS COMORBIDITY AND BODY MASS INDEX (BMI) OF 30.0 TO 30.9 IN ADULT, UNSPECIFIED OBESITY TYPE: ICD-10-CM

## 2019-03-27 DIAGNOSIS — R06.09 DYSPNEA ON EXERTION: ICD-10-CM

## 2019-03-27 LAB
DLCO %PRED: 94 %
DLCO PRED: NORMAL ML/MIN/MMHG
DLCO/VA %PRED: NORMAL %
DLCO/VA PRED: NORMAL ML/MIN/MMHG
DLCO/VA: NORMAL ML/MIN/MMHG
DLCO: NORMAL ML/MIN/MMHG
EXPIRATORY TIME-POST: NORMAL SEC
EXPIRATORY TIME: NORMAL SEC
FEF 25-75% %CHNG: NORMAL
FEF 25-75% %PRED-POST: NORMAL %
FEF 25-75% %PRED-PRE: NORMAL L/SEC
FEF 25-75% PRED: NORMAL L/SEC
FEF 25-75%-POST: NORMAL L/SEC
FEF 25-75%-PRE: NORMAL L/SEC
FEV1 %PRED-POST: 60 %
FEV1 %PRED-PRE: 62 %
FEV1 PRED: NORMAL L
FEV1-POST: NORMAL L
FEV1-PRE: NORMAL L
FEV1/FVC %PRED-POST: NORMAL %
FEV1/FVC %PRED-PRE: NORMAL %
FEV1/FVC PRED: NORMAL %
FEV1/FVC-POST: 53 %
FEV1/FVC-PRE: 58 %
FVC %PRED-POST: NORMAL L
FVC %PRED-PRE: NORMAL %
FVC PRED: NORMAL L
FVC-POST: NORMAL L
FVC-PRE: NORMAL L
GAW %PRED: NORMAL %
GAW PRED: NORMAL L/S/CMH2O
GAW: NORMAL L/S/CMH2O
IC %PRED: NORMAL %
IC PRED: NORMAL L
IC: NORMAL L
LV EF: 58 %
LVEF MODALITY: NORMAL
MEP: NORMAL
MIP: NORMAL
MVV %PRED-PRE: NORMAL %
MVV PRED: NORMAL L/MIN
MVV-PRE: NORMAL L/MIN
PEF %PRED-POST: NORMAL %
PEF %PRED-PRE: NORMAL L/SEC
PEF PRED: NORMAL L/SEC
PEF%CHNG: NORMAL
PEF-POST: NORMAL L/SEC
PEF-PRE: NORMAL L/SEC
RAW %PRED: NORMAL %
RAW PRED: NORMAL CMH2O/L/S
RAW: NORMAL CMH2O/L/S
RV %PRED: NORMAL %
RV PRED: NORMAL L
RV: NORMAL L
SVC %PRED: NORMAL %
SVC PRED: NORMAL L
SVC: NORMAL L
TLC %PRED: 61 %
TLC PRED: NORMAL L
TLC: NORMAL L
VA %PRED: NORMAL %
VA PRED: NORMAL L
VA: NORMAL L
VTG %PRED: NORMAL %
VTG PRED: NORMAL L
VTG: NORMAL L

## 2019-03-27 PROCEDURE — 94760 N-INVAS EAR/PLS OXIMETRY 1: CPT

## 2019-03-27 PROCEDURE — 94060 EVALUATION OF WHEEZING: CPT

## 2019-03-27 PROCEDURE — 93306 TTE W/DOPPLER COMPLETE: CPT

## 2019-03-27 PROCEDURE — 94729 DIFFUSING CAPACITY: CPT

## 2019-03-27 PROCEDURE — 94640 AIRWAY INHALATION TREATMENT: CPT

## 2019-03-27 PROCEDURE — 94726 PLETHYSMOGRAPHY LUNG VOLUMES: CPT

## 2019-03-27 PROCEDURE — 6370000000 HC RX 637 (ALT 250 FOR IP): Performed by: INTERNAL MEDICINE

## 2019-03-27 RX ORDER — ALBUTEROL SULFATE 90 UG/1
4 AEROSOL, METERED RESPIRATORY (INHALATION) ONCE
Status: COMPLETED | OUTPATIENT
Start: 2019-03-27 | End: 2019-03-27

## 2019-03-27 RX ADMIN — Medication 4 PUFF: at 08:18

## 2019-03-27 ASSESSMENT — PULMONARY FUNCTION TESTS
FEV1/FVC_POST: 53
FEV1_PERCENT_PREDICTED_POST: 60
FEV1_PERCENT_PREDICTED_PRE: 62
FEV1/FVC_PRE: 58

## 2019-03-29 ENCOUNTER — HOSPITAL ENCOUNTER (OUTPATIENT)
Dept: SLEEP CENTER | Age: 64
Discharge: HOME | End: 2019-03-31
Payer: MEDICARE

## 2019-03-29 DIAGNOSIS — R29.818 SUSPECTED SLEEP APNEA: ICD-10-CM

## 2019-03-29 DIAGNOSIS — G47.30 OBSERVED SLEEP APNEA: ICD-10-CM

## 2019-03-29 PROCEDURE — 95806 SLEEP STUDY UNATT&RESP EFFT: CPT

## 2019-04-02 DIAGNOSIS — R00.2 PALPITATIONS: ICD-10-CM

## 2019-04-03 ENCOUNTER — HOSPITAL ENCOUNTER (OUTPATIENT)
Dept: PHYSICAL THERAPY | Age: 64
Setting detail: THERAPIES SERIES
Discharge: HOME | End: 2019-04-03
Payer: MEDICARE

## 2019-04-03 PROCEDURE — 97110 THERAPEUTIC EXERCISES: CPT | Performed by: PHYSICAL THERAPIST

## 2019-04-03 PROCEDURE — 97016 VASOPNEUMATIC DEVICE THERAPY: CPT | Performed by: PHYSICAL THERAPIST

## 2019-04-03 PROCEDURE — 97161 PT EVAL LOW COMPLEX 20 MIN: CPT | Performed by: PHYSICAL THERAPIST

## 2019-04-03 NOTE — FLOWSHEET NOTE
[facility]    Physical Therapy Daily Treatment Note  Date:  4/3/2019    Patient Name:  Rene Bolton  :  1955  MRN: [id number]  Restrictions/Precautions:    Medical/Treatment Diagnosis Information:  · Diagnosis: PT: L TKR revision (19)  · Treatment Diagnosis: L knee pain secondary to TKR revision M25.562/ D73.17  Insurance/Certification information:  PT Insurance Information: Medicare/ AARP  Physician Information:  Referring Practitioner: Ronnie Nance of care signed (Y/N):     Date of Patient follow up with Physician:     G-Code (if applicable):      Date G-Code Applied:    PT G-Codes  Functional Assessment Tool Used: LEFS  Score: 88% disability    Progress Note: [x]  Yes  []  No  Next due by: Visit #10       Latex Allergy:  [x]NO      []YES  Preferred Language for Healthcare:   [x]English       []other:    Visit # Insurance Allowable   1 Med Nec     Pain level:  7/10     SUBJECTIVE:  See eval    OBJECTIVE: See eval  Observation:   Test measurements:      RESTRICTIONS/PRECAUTIONS: LATEX ALLERGY    Exercises/Interventions:     Therapeutic Ex Sets/sec Notes HEP   Belt stretch  Seated HSS 30\"x3  30\"x3     Heel prop  Heel slides 1'  x10     QS  SLR x10  x10     Standing ABD x10     LAQ  HS curls x10  x10 Green                                                                       Manual Intervention                                          NMR re-education                                                                Therapeutic Exercise and NMR EXR  [] ([id number]) Provided verbal/tactile cueing for activities related to strengthening, flexibility, endurance, ROM for improvements in LE, proximal hip, and core control with self care, mobility, lifting, ambulation.  [] (36861) Provided verbal/tactile cueing for activities related to improving balance, coordination, kinesthetic sense, posture, motor skill, proprioception  to assist with LE, proximal hip, and core control in self care, mobility, lifting, ambulation and eccentric single leg control. NMR and Therapeutic Activities:    [] (88601 or 41125) Provided verbal/tactile cueing for activities related to improving balance, coordination, kinesthetic sense, posture, motor skill, proprioception and motor activation to allow for proper function of core, proximal hip and LE with self care and ADLs  [] (44655) Gait Re-education- Provided training and instruction to the patient for proper LE, core and proximal hip recruitment and positioning and eccentric body weight control with ambulation re-education including up and down stairs     Home Exercise Program:    [x] (62831) Reviewed/Progressed HEP activities related to strengthening, flexibility, endurance, ROM of core, proximal hip and LE for functional self-care, mobility, lifting and ambulation/stair navigation   [] (33049)Reviewed/Progressed HEP activities related to improving balance, coordination, kinesthetic sense, posture, motor skill, proprioception of core, proximal hip and LE for self care, mobility, lifting, and ambulation/stair navigation      Manual Treatments:  PROM / STM / Oscillations-Mobs:  G-I, II, III, IV (PA's, Inf., Post.)  [] (34698) Provided manual therapy to mobilize LE, proximal hip and/or LS spine soft tissue/joints for the purpose of modulating pain, promoting relaxation,  increasing ROM, reducing/eliminating soft tissue swelling/inflammation/restriction, improving soft tissue extensibility and allowing for proper ROM for normal function with self care, mobility, lifting and ambulation.      Modalities:  VASO 10 minutes for swelling/ effusion to L knee    Charges:  Timed Code Treatment Minutes: 30   Total Treatment Minutes: 60     [x] EVAL LOW 04150  [x] EE(76349) x  2   [] IONTO  [] NMR (53663) x      [x] VASO  [] Manual (85590) x       [] Other:  [] TA x       [] Mech Traction (13919)  [] ES(attended) (43809)      [] ES (un)

## 2019-04-03 NOTE — PLAN OF CARE
[address] [facility], [address] [facility] [address], [address] [facility] [address]  Phone: [phone]   Fax:     [phone]       Physical Therapy Certification    Dear Referring Practitioner: Robert Gomez,    We had the pleasure of evaluating the following patient for physical therapy services at [address] [place] [address]. A summary of our findings can be found in the initial assessment below. This includes our plan of care. If you have any questions or concerns regarding these findings, please do not hesitate to contact me at the office phone number checked above. Thank you for the referral.       Physician Signature:_______________________________Date:__________________  By signing above (or electronic signature), therapists plan is approved by physician      Patient: Arlin Peraza   : 1955   MRN: [id number]  Referring Physician: Referring Practitioner: Radha      Evaluation Date: 4/3/2019      Medical Diagnosis Information:  Diagnosis: PT: L TKR revision (19)   Treatment Diagnosis: L knee pain secondary to TKR revision M25.562/ M17.12                                         Insurance information: PT Insurance Information: Medicare/ AARP     Precautions/ Contra-indications: unknown allergies (potential latex)  Latex Allergy:  [x]NO      []YES  Preferred Language for Healthcare:   [x]English       []other:    SUBJECTIVE: Patient stated complaint:Patient reports original TKR Asad Hole) 3/2017 but reports pain never subsided and was found to be allergic to the nickel device. Patient states he had a clean out and revision without relief. Patient was placed on disability, sent to [facility], and sent to pain clinic for one visit. Patient was sent to [facility] who was told they could clip the nerve to lose all feelings.  Patient state he had to cancel TKR revision 4 times due to heart/ lung problems. Patient had L TKR revision 2/18/19, woke up with blisters covering leg so had to stay for 4 days in hospital. Patient has had home PT/ nurse. Home PT sent patient to ER for concern of DVT, which was negative- told not to do any activity for past week. Relevant Medical History:Seeing cardiologist/ pulmonologist (SOB, erratic pulse, HR racing), unknown allergies, Tobacco abuse  Functional Disability Index:PT G-Codes  Functional Assessment Tool Used: LEFS  Score: 88% disability    Pain Scale: 6/10  Easing factors: rest, ice, NSAIDs  Provocative factors: increased activity, walking, end of day, stepping into shower, standing > 20 minutes, getting in/out of chairs      Type: []Constant   [x]Intermittent  []Radiating [x]Localized []other:     Numbness/Tingling: N/T into anterior knee/ kneecap    Occupation/School: Disability    Living Status/Prior Level of Function: Independent with ADLs and IADLs, no stairs at home. Using cane community distances. OBJECTIVE:     ROM LEFT RIGHT   HIP Flex     HIP Abd     HIP Ext     HIP IR     HIP ER     Knee ext Lacking 8  0   Knee Flex 120 140   Ankle PF     Ankle DF     Ankle In     Ankle Ev     Strength  LEFT RIGHT   HIP Flexors  5   HIP Abductors  5   HIP Ext     Hip ER     Knee EXT (quad) SLR (ext lag) 5   Knee Flex (HS)  5   Ankle DF     Ankle PF     Ankle Inv     Ankle EV          Circumference  Mid apex  7 cm prox             Reflexes/Sensation:    [x]Dermatomes/Myotomes intact    [x]Reflexes equal and normal bilaterally   []Other:    Joint mobility: NT   []Normal    []Hypo   []Hyper    Palpation: NT    Functional Mobility/Transfers: pain with bed mobility, UE A for sit to stand. Patient weight shifting/ requires change of position throughout session (every 5-10 minutes)    Posture: na    Bandages/Dressings/Incisions: incision closed/ healing.  Scabbing/ skin discoloration anterior shin per patient from allergy following surgery    Gait: (include devices/WB status) ambulating with standard cane, decreased weight shift/ decreased TKE, Trendelenberg    Orthopedic Special Tests: na                       [x] Patient history, allergies, meds reviewed. Medical chart reviewed. See intake form. Review Of Systems (ROS):  [x]Performed Review of systems (Integumentary, CardioPulmonary, Neurological) by intake and observation. Intake form has been scanned into medical record. Patient has been instructed to contact their primary care physician regarding ROS issues if not already being addressed at this time.       Co-morbidities/Complexities (which will affect course of rehabilitation):   []None           Arthritic conditions   []Rheumatoid arthritis (M05.9)  [x]Osteoarthritis (M19.91)   Cardiovascular conditions   []Hypertension (I10)  []Hyperlipidemia (E78.5)  []Angina pectoris (I20)  []Atherosclerosis (I70)   Musculoskeletal conditions   []Disc pathology   []Congenital spine pathologies   [x]Prior surgical intervention  []Osteoporosis (M81.8)  []Osteopenia (M85.8)   Endocrine conditions   []Hypothyroid (E03.9)  []Hyperthyroid Gastrointestinal conditions   []Constipation (X78.14)   Metabolic conditions   []Morbid obesity (E66.01)  []Diabetes type 1(E10.65) or 2 (E11.65)   []Neuropathy (G60.9)     Pulmonary conditions   []Asthma (J45)  [x]Coughing   []COPD (J44.9)   Psychological Disorders  []Anxiety (F41.9)  []Depression (F32.9)   []Other:   [x]Other:     Cardiopulmonary issues     Barriers to/and or personal factors that will affect rehab potential:              []Age  []Sex              []Motivation/Lack of Motivation                        [x]Co-Morbidities              []Cognitive Function, education/learning barriers              []Environmental, home barriers              []profession/work barriers  [x]past PT/medical experience  []other:  Justification: 3rd surgery on LLE secondary to infection    Falls Risk Assessment (30 days):   [x] Falls Risk assessed and no intervention required. [] Falls Risk assessed and Patient requires intervention due to being higher risk   TUG score (>12s at risk):     [] Falls education provided, including       G-Codes:  PT G-Codes  Functional Assessment Tool Used: LEFS  Score: 88% disability    ASSESSMENT:   Functional Impairments:     []Noted lumbar/proximal hip/LE joint hypomobility   [x]Decreased LE functional ROM   [x]Decreased core/proximal hip strength and neuromuscular control   [x]Decreased LE functional strength   [x]Reduced balance/proprioceptive control   []other:      Functional Activity Limitations (from functional questionnaire and intake)   []Reduced ability to tolerate prolonged functional positions   []Reduced ability or difficulty with changes of positions or transfers between positions   []Reduced ability to maintain good posture and demonstrate good body mechanics with sitting, bending, and lifting   [x]Reduced ability to sleep   [x] Reduced ability or tolerance with driving and/or computer work   [x]Reduced ability to perform lifting, carrying tasks   [x]Reduced ability to squat   [x]Reduced ability to forward bend   [x]Reduced ability to ambulate prolonged functional periods/distances/surfaces   [x]Reduced ability to ascend/descend stairs   [x]Reduced ability to run, hop, cut or jump   []other:    Participation Restrictions   [x]Reduced participation in self care activities   [x]Reduced participation in home management activities   []Reduced participation in work activities   [x]Reduced participation in social activities. []Reduced participation in sport/recreation activities. Classification :    [x]Signs/symptoms consistent with post-surgical status including decreased ROM, strength and function.    []Signs/symptoms consistent with joint sprain/strain   []Signs/symptoms consistent with patella-femoral syndrome   []Signs/symptoms consistent with knee OA/hip OA   []Signs/symptoms consistent with internal derangement of knee/Hip   []Signs/symptoms consistent with functional hip weakness/NMR control      []Signs/symptoms consistent with tendinitis/tendinosis    []signs/symptoms consistent with pathology which may benefit from Dry needling      []other:      Prognosis/Rehab Potential:      []Excellent   [x]Good    []Fair   []Poor    Tolerance of evaluation/treatment:    []Excellent   [x]Good    []Fair   []Poor    Physical Therapy Evaluation Complexity Justification  [x] A history of present problem with:  [] no personal factors and/or comorbidities that impact the plan of care;  [x]1-2 personal factors and/or comorbidities that impact the plan of care  []3 personal factors and/or comorbidities that impact the plan of care  [x] An examination of body systems using standardized tests and measures addressing any of the following: body structures and functions (impairments), activity limitations, and/or participation restrictions;:  [] a total of 1-2 or more elements   [] a total of 3 or more elements   [x] a total of 4 or more elements   [x] A clinical presentation with:  [x] stable and/or uncomplicated characteristics   [] evolving clinical presentation with changing characteristics  [] unstable and unpredictable characteristics;   [x] Clinical decision making of [x] low, [] moderate, [] high complexity using standardized patient assessment instrument and/or measurable assessment of functional outcome.     [x] EVAL (LOW) 64472 (typically 20 minutes face-to-face)  [] EVAL (MOD) 29458 (typically 30 minutes face-to-face)  [] EVAL (HIGH) 52162 (typically 45 minutes face-to-face)  [] RE-EVAL     PLAN:   Frequency/Duration:  2 days per week for 12 Weeks:  Interventions:  [x]  Therapeutic exercise including: strength training, ROM, for Lower extremity and core   [x]  NMR activation and proprioception for LE, Glutes and Core   [x]  Manual therapy as indicated for LE, Hip and spine to include: Dry Needling/IASTM, STM, PROM, Gr I-IV mobilizations, manipulation. [x] Modalities as needed that may include: thermal agents, E-stim, Biofeedback, US, iontophoresis as indicated  [x] Patient education on joint protection, postural re-education, activity modification, progression of HEP. HEP instruction: (see scanned forms)    GOALS:  Patient stated goal: \"Return to normal\"    Therapist goals for Patient:   Short Term Goals: To be achieved in: 2 weeks  1. Independent in HEP and progression per patient tolerance, in order to prevent re-injury. 2. Patient will have a decrease in pain to facilitate improvement in movement, function, and ADLs as indicated by Functional Deficits. Long Term Goals: To be achieved in: 12 weeks  1. Disability index score of 30% or less for the LEFS to assist with reaching prior level of function. 2. Patient will demonstrate increased AROM to 0-120 to allow for proper joint functioning as indicated by patients Functional Deficits. 3. Patient will demonstrate an increase in Strength to good proximal hip strength and control, within 5lb HHD in LE to allow for proper functional mobility as indicated by patients Functional Deficits. 4. Patient will return to full functional activities without increased symptoms or restriction including ability to ambulate I without gait abnormality. 5. Patient will be able to perform bed mobility and stand from seated position pain free to return to ADLs.      Electronically signed by:  Raven Casey PT

## 2019-04-05 ENCOUNTER — OFFICE VISIT (OUTPATIENT)
Dept: PULMONOLOGY | Age: 64
End: 2019-04-05
Payer: MEDICARE

## 2019-04-05 VITALS
SYSTOLIC BLOOD PRESSURE: 133 MMHG | HEIGHT: 67 IN | DIASTOLIC BLOOD PRESSURE: 80 MMHG | HEART RATE: 104 BPM | WEIGHT: 186 LBS | RESPIRATION RATE: 18 BRPM | BODY MASS INDEX: 29.19 KG/M2 | OXYGEN SATURATION: 99 %

## 2019-04-05 DIAGNOSIS — E66.9 CLASS 1 OBESITY WITH SERIOUS COMORBIDITY AND BODY MASS INDEX (BMI) OF 30.0 TO 30.9 IN ADULT, UNSPECIFIED OBESITY TYPE: ICD-10-CM

## 2019-04-05 DIAGNOSIS — J98.4 RESTRICTIVE LUNG DISEASE: ICD-10-CM

## 2019-04-05 DIAGNOSIS — G47.30 OBSERVED SLEEP APNEA: Primary | ICD-10-CM

## 2019-04-05 DIAGNOSIS — J41.0 SIMPLE CHRONIC BRONCHITIS (HCC): ICD-10-CM

## 2019-04-05 DIAGNOSIS — M22.2X2 PATELLOFEMORAL STRESS SYNDROME OF LEFT KNEE: ICD-10-CM

## 2019-04-05 DIAGNOSIS — F17.200 CURRENT SMOKER: ICD-10-CM

## 2019-04-05 PROCEDURE — G8427 DOCREV CUR MEDS BY ELIG CLIN: HCPCS | Performed by: INTERNAL MEDICINE

## 2019-04-05 PROCEDURE — G8417 CALC BMI ABV UP PARAM F/U: HCPCS | Performed by: INTERNAL MEDICINE

## 2019-04-05 PROCEDURE — 4040F PNEUMOC VAC/ADMIN/RCVD: CPT | Performed by: INTERNAL MEDICINE

## 2019-04-05 PROCEDURE — 4004F PT TOBACCO SCREEN RCVD TLK: CPT | Performed by: INTERNAL MEDICINE

## 2019-04-05 PROCEDURE — G8926 SPIRO NO PERF OR DOC: HCPCS | Performed by: INTERNAL MEDICINE

## 2019-04-05 PROCEDURE — 99214 OFFICE O/P EST MOD 30 MIN: CPT | Performed by: INTERNAL MEDICINE

## 2019-04-05 PROCEDURE — 1123F ACP DISCUSS/DSCN MKR DOCD: CPT | Performed by: INTERNAL MEDICINE

## 2019-04-05 PROCEDURE — 3023F SPIROM DOC REV: CPT | Performed by: INTERNAL MEDICINE

## 2019-04-05 PROCEDURE — 3017F COLORECTAL CA SCREEN DOC REV: CPT | Performed by: INTERNAL MEDICINE

## 2019-04-05 ASSESSMENT — SLEEP AND FATIGUE QUESTIONNAIRES
HOW LIKELY ARE YOU TO NOD OFF OR FALL ASLEEP IN A CAR, WHILE STOPPED FOR A FEW MINUTES IN TRAFFIC: 0
HOW LIKELY ARE YOU TO NOD OFF OR FALL ASLEEP WHEN YOU ARE A PASSENGER IN A CAR FOR AN HOUR WITHOUT A BREAK: 0
HOW LIKELY ARE YOU TO NOD OFF OR FALL ASLEEP WHILE SITTING INACTIVE IN A PUBLIC PLACE: 0
NECK CIRCUMFERENCE (INCHES): 18
HOW LIKELY ARE YOU TO NOD OFF OR FALL ASLEEP WHILE SITTING AND TALKING TO SOMEONE: 0
HOW LIKELY ARE YOU TO NOD OFF OR FALL ASLEEP WHILE WATCHING TV: 0
HOW LIKELY ARE YOU TO NOD OFF OR FALL ASLEEP WHILE SITTING AND READING: 0
ESS TOTAL SCORE: 3
HOW LIKELY ARE YOU TO NOD OFF OR FALL ASLEEP WHILE LYING DOWN TO REST IN THE AFTERNOON WHEN CIRCUMSTANCES PERMIT: 3
HOW LIKELY ARE YOU TO NOD OFF OR FALL ASLEEP WHILE SITTING QUIETLY AFTER LUNCH WITHOUT ALCOHOL: 0

## 2019-04-05 NOTE — PROGRESS NOTES
Chief Complaint/Referring Provider:  Patient has come again for pulmonary evaluation  And to discuss the test results     Patient has come to the office for pulmonary follow-up, patient still has coughing with scanty mucoid expectoration, patient still has shortness of breath with exertion, patient's spouse states that when he goes to the mailbox and comes back or when he walks some distance he starts having more shortness of breath along with that patient has tachycardia, patient does not have any wheezing per se, patient does not have any increasing nasal congestion or sinus congestion, patient has not been able to walk around 2 much because of the left knee issues, patient has not done any regular exercise on a regular basis in the recent past; he does not have any significant abdominal discomfort and nausea vomiting, patient still has difficulty in ambulation, patient continues to be a smoker, patient still feels tired and having less energy, no other pertinent review of system of concern      Previous  HPIPatient was recently reevaluated by a cardiologist and was found to have high heart rate and after reevaluation by the cardiologist it was noticed that patient did not do the sleep studies as ordered on the previous visit, and patient was told her to follow up in this office, patient says that he did not do the tests as ordered because he was not valve, patient says that he has had 3 prostate surgeries and he is not sure that if he has any prostate cancer not, patient also says that he had left knee surgery and because of these reasons he cannot do any tests, patient says that he has shortness of breath, patient's excise tolerance is limited to less than 100 yards, patient does have some cough with mucoid expectoration which is not significant, patient does not have any profound rhinorrhea or nasal congestion, patient does not have any chest pain, patient does have intermittent palpitations and high heart rate, patient on questioning further states that he continues to be a smoker, patient does not have any hemoptysis, patient on questioning further states that he does not have any significant abdominal symptoms of concern, patient does have a history of snoring and was told that he has abdominal breathing pattern and he stops breathing at nighttime, patient does have some left leg swelling after the surgery, patient does not have any confusion or lethargy, no other pertinent review of system of concern           : Patient is a 64-year-old male who has come to the office for pulmonary evaluation for possible sleep apnea  Patient says that he has been having cardiac issues for last 6 months or so, patient says that he has been having rapid heart rate with irregular and the heart rate goes as high as 140/m for which he was seeing Dr. Irving Mcknight, patient says that along with the high heart rate he was having some shortness of breath with exertion, patient says that he has switched his cardiologist from Dr. Irving Mcknight to document and at the [facility] and the patient was started on metoprolol and also patient is supposed to have a echocardiogram in the near future as a part of the workup as been told by the patient, patient on questioning further states that he has some throat irritation and swelling of throat early in the morning time, patient says that he also has been having minimal coughing but does not have any significant expectoration, patient does not have any wheezing per se, patient has had some left-sided pleuritic chest pain along with that patient says that he feels that he also has recently developed right sided chest pain which she thinks that is secondary to pulling of a muscle, patient says that he had a urinary retention and he had to go for a prostate surgery about 7 weeks back along with that patient says that his body is allergic to the medical in the records which were placed for his arthritis in ARTHROSCOPY, SYNOVECTOMY      PROSTATE SURGERY      REVISION TOTAL KNEE ARTHROPLASTY Left 2/18/2019    REVISION LEFT TOTAL KNEE ARTHROPLASTY               LOREDO performed by Handy Hathaway MD at [address] Left 2017       Allergies   Allergen Reactions    Other Anaphylaxis    Percocet [Oxycodone-Acetaminophen] Shortness Of Breath     Felt like a heart attack  Felt like a heart attack      Morphine Other (See Comments)     Other reaction(s):  Other (See Comments)  Hallucinations  Hallucinations  Lights me up    Morphine And Related Other (See Comments)    Nickel     Propoxyphene      Darvocet       Medication list was reviewed and updated as needed in Epic    Social History     Socioeconomic History    Marital status:      Spouse name: Not on file    Number of children: Not on file    Years of education: Not on file    Highest education level: Not on file   Occupational History    Not on file   Social Needs    Financial resource strain: Not on file    Food insecurity:     Worry: Not on file     Inability: Not on file    Transportation needs:     Medical: Not on file     Non-medical: Not on file   Tobacco Use    Smoking status: Current Every Day Smoker     Packs/day: 0.50     Years: 30.00     Pack years: 15.00     Types: Cigarettes    Smokeless tobacco: Never Used   Substance and Sexual Activity    Alcohol use: No    Drug use: No    Sexual activity: Yes     Partners: Female   Lifestyle    Physical activity:     Days per week: Not on file     Minutes per session: Not on file    Stress: Not on file   Relationships    Social connections:     Talks on phone: Not on file     Gets together: Not on file     Attends Jain service: Not on file     Active member of club or organization: Not on file     Attends meetings of clubs or organizations: Not on file     Relationship status: Not on file    Intimate partner violence:     Fear of current or ex partner: Not on file     Emotionally abused: Not on file     Physically abused: Not on file     Forced sexual activity: Not on file   Other Topics Concern    Not on file   Social History Narrative    Not on file       Family History   Problem Relation Age of Onset    Heart Disease Mother         defibrillator    Thyroid Disease Father         PPM    Arthritis Sister     Alcohol Abuse Brother         arthritis    Other Sister         Typhoid fever complications    Other Sister         obesity    Cancer Maternal Grandmother     Alzheimer's Disease Maternal Grandmother     Diabetes Paternal Grandmother             ROS same as above     Physical Exam:  Blood pressure 133/80, pulse 104, resp. rate 18, height 5' 7\" (1.702 m), weight 186 lb (84.4 kg), SpO2 99 %.'  Constitutional:  No acute distress. HENT:  Oropharynx is clear and moist. No thyromegaly. Eyes:  Conjunctivae are normal. Pupils equal, round, and reactive to light. No scleral icterus. Neck: . No tracheal deviation present. No obvious thyroid mass. Diameter is increased   Cardiovascular: Normal rate, regular rhythm, normal heart sounds. No right ventricular heave. No lower extremity edema. Pulmonary/Chest: No wheezes. No rales. Chest wall is not dull to percussion. No accessory muscle usage or stridor. Decreased BSI at bases  Abdominal: Soft. Bowel sounds present. No distension or hernia. No tenderness. Obese   Musculoskeletal: No cyanosis. No clubbing. status post left he surgery with swelling   Lymphadenopathy: No cervical or supraclavicular adenopathy. Skin: Skin is warm and dry. No rash or nodules on the exposed extremities. Psychiatric: Normal mood and affect. Behavior is normal.  No anxiety. Neurologic: Alert, awake and oriented. PERRL. Speech fluent, uses cane for walking      Sleep Medicine Data:  Sitting and reading: Would never doze  Watching TV: Would never doze  Sitting, inactive in a public place (e.g. a theatre or a meeting):  Would never doze  As a passenger in a car for an hour without a break: Would never doze  Lying down to rest in the afternoon when circumstances permit: High chance of dozing  Sitting and talking to someone: Would never doze  Sitting quietly after a lunch without alcohol: Would never doze  In a car, while stopped for a few minutes in traffic: Would never doze  Total score: 3  Neck circumference: 18    Data:     Imaging:  I have reviewed radiology images personally. No orders to display     CTA OF THE CHEST WITH CONTRAST       HISTORY: Palpitations, short of breath       Comparison exam CT chest 9/27/2005       FINDINGS: Thin section axial images obtained through the chest   following the administration of 80 mL Isovue-370.  Multiplanar   reconstruction images received for interpretation.  Low radiation   dose CT technique utilized.       The tracheobronchial tree is patent.  There is mild dependent   atelectasis.  No localized consolidation or pleural effusion. Mild groundglass opacity throughout both lungs.  No discrete mass   or pulmonary nodule identified.  Pulmonary vascularity appears   within normal limits.       Heart size appears within normal limits.  No pericardial effusion   seen.  Visualized great vessels are intact.  There is contrast   opacification of the pulmonary arteries.  No discrete filling   defects or findings for pulmonary thromboembolism on the current   exam.  No axillary, hilar, or mediastinal lymphadenopathy seen.     There are a few small scattered paratracheal and AP window nodes,   none of which meet criteria for lymphadenopathy.       Visualized bony structures are intact.  Limited images through the   upper abdomen demonstrates multiple circumscribed hepatic lesions   consistent with hepatic cyst.  These demonstrate no significant   interval change from prior CT abdomen exam of 3/20/2016 consistent   with hepatic cysts.             Impression       1.  No CT findings for pulmonary Dyspnea on exertion/COPD/Restrictive lung disease       2. Observed sleep apnea        3. Current smoker      4. Gastroesophageal reflux disease, esophagitis presence not specified      5. S/P transurethral resection of prostate      6. Class 1 obesity with serious comorbidity and body mass index (BMI) of 30.0 to 30.9 in adult, unspecified obesity type      7.  Hepatic cyst          Plan:   · Patient was told about the auscultative findings and implications  · Patient was told about the PFT results along with implications  · Patient was also told about the sleep study results along with implications  · Patient has mild sleep apnea and if patient is care of his diet exercise and lose weight patient may not require CPAP and partial wants to going with that approach  · Patient was pain told about the recent CT scan of the chest which was done a few weeks back along with findings, interpretation and implications  · Patient does not have any spots on his lungs of concern  · Patient was told about the pathophysiology of the disease process and its modifying factors  · Patient was told that he needs to quit smoking otherwise he will have increasing morbidity and mortality and can be a respiratory cripple-reinforced once again  · Patient was told about the various modalities to stop smoking-patient is not ready for commitment for smoking cessation at this time  · Patient was told her to try some Stiolto respimat inhaler and a sample was given from the office for use and see if that makes a difference in his respiratory status  · Patient was told about the pathophysiology and sequelae of TITUS  · Patient will benefit from a home sleep study which is being ordered  · Patient also is to have a PFT to see if he has any obstructive airway disease or reactive or disease or any restrictive lung disease which warrants further evaluation and management  · Patient was told about diet and lifestyle modifications  · Patient to continue with PPI for GERD  · Patient needs to lose weight-patient cannot do much exercise because of the left knee issues-patient was told to see if he can get to do some water aerobics which may help him  · Patient was also told once again about smoking cessation  · Patient is having constipation because of narcotics-management as per PCP  · Patient refused pneumonia vaccine-does not believe in pneumonia shots  · Patient does not believe in taking flu shots  · Patient to take Xopenex inhaler 2 puffs every 6 on whenever necessary basis and patient was taught how to take it properly and patient exhibits understanding and patient was told that he needs to keep in his pocket on the time as he is not carrying it  · Patient was told about diet and lifestyle modification  · Patient needs to lose weight  · Patient needs to do regular exercise and can try some water aerobics  · If patient has persistent sensation of tiredness and sleepiness can consider giving her CPAP at that time  · Patient was also told once again that he needs to be compliant to help himself otherwise he will have increasing symptoms and signs  · Patient to let us know if the new inhaler is helping him a lot significant tinnitus and call ascription that give him samples for that  · Once again patient was told that it is imperative for him to stop smoking

## 2019-04-05 NOTE — PATIENT INSTRUCTIONS
Sleep Hygiene. .. Tips for better sleep. .. Avoid naps. This will ensure you are sleepy at bedtime. If you have to take a nap, sleep less than 1 hour, before 3 pm.  Sleep only when sleepy; this reduces the time you are awake in bed. Regular exercise is recommended to help you deepen your sleep, but not within 4-6 hours of your bedtime. Timing of exercise is important, aim to exercise early in the morning or early afternoon. A light snack may help you fall asleep. Warm milk and foods high in the amino acid tryptophan, such as bananas, may help you to sleep  Be sure to avoid heavy, spicy or sugary foods 4-6 hours before bedtime and avoid at snack time. Stay away from stimulants such as caffeine and nicotine for at least 4-6 hours before bed. Stimulants can interfere with your ability to fall asleep. Caffeine is found in tea, cola, coffee, cocoa and chocolate and is best avoided at bedtime. Nicotine is found in tobacco products. Avoid alcohol 4-6 hours before bedtime. Alcohol has an immediate sleep-inducing effect, after a few hours when alcohol levels fall there is a stimulant or wake-up effect and will cause fragmented sleep. Sleep rituals are important. Give your body clues it is time to slow down and sleep. Examples include; yoga, deep breathing, listen to relaxing music, a hot bath or a few minutes of reading. Have a fixed bedtime and awakening time, Even on weekends! You will feel better keeping a regular sleep cycle, even if you are retired or not working. Get into your favorite sleep position. If not asleep in 30 minutes, get up and do something boring until you feel sleepy. Remember not to expose yourself to bright lights such as TV, phone or tablet screens. Only use your bed for sleeping. Do not use your bed as an office, workroom or recreation room. Use comfortable bedding. Uncomfortable bedding can prevent good sleep. Ensure your bedroom is quiet and comfortable.  A cooler room along with enough blankets to stay warm is recommended. If your room is too noisy, try a white noise machine. If too bright, try black out shades or an eye mask. Dont take worries to bed. Leave worries about work, school etc. behind you when you go to bed. Some people find it helpful to assign a worry period in the evening or late afternoon to write down your worries and get them out of your system. Tips to Help You Stop Smoking (taken from Up-To-Date)      Cigarette smoking is a preventable cause of death in the [place]. Quitting smoking now can decrease your risk of getting smoking-related illnesses like heart disease, stroke, cancer & COPD. S = Set a quit date. T = Tell family, friends, and the people around you that you plan to quit. A = Anticipate or plan ahead for the tough times you'll face while quitting. R = Remove cigarettes and other tobacco products from your home, car, and work. T = Talk to your doctor about getting help to quit. Your doctor may give you medicines to reduce your craving for cigarettes & the unpleasant symptoms that happen when you stop smoking (called withdrawal symptoms). What are the symptoms of withdrawal? -- The symptoms include:   ?Trouble sleeping   ? Being irritable, anxious or restless   ? Getting frustrated or angry   ? Having trouble thinking clearly  Nicotine replacement therapy eases withdrawal and reduces your bodys craving for nicotine, the main drug found in cigarettes. Non-prescription forms of nicotine replacement include skin patches, lozenges, and gum. Two prescription medications are available that have been proven to help people stop smoking:  ? Bupropion is a prescription medicine that reduces your desire to smoke. This medicine is sold under the brand names Zyban and Wellbutrin & as a generic formulation. ? Varenicline (brand name: Chantix) is a prescription medicine that reduces withdrawal symptoms and cigarette cravings.   If you take

## 2019-04-08 ENCOUNTER — HOSPITAL ENCOUNTER (OUTPATIENT)
Dept: PHYSICAL THERAPY | Age: 64
Setting detail: THERAPIES SERIES
Discharge: HOME | End: 2019-04-08
Payer: MEDICARE

## 2019-04-08 PROCEDURE — 97140 MANUAL THERAPY 1/> REGIONS: CPT | Performed by: PHYSICAL THERAPIST

## 2019-04-08 PROCEDURE — 97110 THERAPEUTIC EXERCISES: CPT | Performed by: PHYSICAL THERAPIST

## 2019-04-08 PROCEDURE — 97016 VASOPNEUMATIC DEVICE THERAPY: CPT | Performed by: PHYSICAL THERAPIST

## 2019-04-08 NOTE — FLOWSHEET NOTE
[facility] Daily Treatment Note  Date:  2019    Patient Name:  Lenka Kenney  :  1955  MRN: [id number]  Restrictions/Precautions:    Medical/Treatment Diagnosis Information:  · Diagnosis: PT: L TKR revision (19)  · Treatment Diagnosis: L knee pain secondary to TKR revision M25.562/ S69.43  Insurance/Certification information:  PT Insurance Information: Medicare/ AARP  Physician Information:  Referring Practitioner: Alfred Kahn of care signed (Y/N):     Date of Patient follow up with Physician:     G-Code (if applicable):      Date G-Code Applied:         Progress Note: [x]  Yes  []  No  Next due by: Visit #10       Latex Allergy:  [x]NO      []YES  Preferred Language for Healthcare:   [x]English       []other:    Visit # Insurance Allowable   2 Med Nec     Pain level:  7/10     SUBJECTIVE:  Having a lot of increased pain as he drove 1.5 hours to visit mother over the weekend. Saw pulmonologist who told patient he needs to lose weight and stop smoking.      OBJECTIVE: See eval  Observation:   Test measurements:  L knee AROM: lacking 2 degrees-120 degrees    RESTRICTIONS/PRECAUTIONS: LATEX ALLERGY    Exercises/Interventions:     Therapeutic Ex Sets/sec Notes HEP   Belt stretch  Seated HSS 30\"x3  30\"x3     Heel prop  Heel slides 1'  x10     QS  SLR x10  x30     Standing ABD  Step Ups  SLS x30 ea  4\"x10  x10     LAQ  HS curls 2# x30  x30 Green     Bike 6'     Manual therapy 8' JASMINE Flood for flex/ext                                                          Manual Intervention                                          NMR re-education                                                                Therapeutic Exercise and NMR EXR  [] (70116) Provided verbal/tactile cueing for activities related to strengthening, flexibility, endurance, ROM for improvements in LE, proximal hip, and core control with self care, Minutes: 50     [] EVAL LOW 78515  [x] CH(88880) x  2   [] IONTO  [] NMR (22182) x      [x] VASO  [x] Manual (40943) x       [] Other:  [] TA x       [] Mech Traction (07406)  [] ES(attended) (20749)      [] ES (un) (27721):     GOALS:   Patient stated goal: \"Return to normal\"    Therapist goals for Patient:   Short Term Goals: To be achieved in: 2 weeks  1. Independent in HEP and progression per patient tolerance, in order to prevent re-injury. 2. Patient will have a decrease in pain to facilitate improvement in movement, function, and ADLs as indicated by Functional Deficits. Long Term Goals: To be achieved in: 12 weeks  1. Disability index score of 30% or less for the LEFS to assist with reaching prior level of function. 2. Patient will demonstrate increased AROM to 0-120 to allow for proper joint functioning as indicated by patients Functional Deficits. 3. Patient will demonstrate an increase in Strength to good proximal hip strength and control, within 5lb HHD in LE to allow for proper functional mobility as indicated by patients Functional Deficits. 4. Patient will return to full functional activities without increased symptoms or restriction including ability to ambulate I without gait abnormality. 5. Patient will be able to perform bed mobility and stand from seated position pain free to return to ADLs. Progression Towards Functional goals:  [] Patient is progressing as expected towards functional goals listed. [] Progression is slowed due to complexities listed. [] Progression has been slowed due to co-morbidities.   [x] Plan just implemented, too soon to assess goals progression  [] Other:     ASSESSMENT:  Poor stamina secondary to shortness of breath (awaiting cardiologist appointment to implement more strenuous strengthening exercises.)     Treatment/Activity Tolerance:  [] Patient tolerated treatment well [] Patient limited by fatique  [] Patient limited by pain  [] Patient limited by other medical complications  [] Other:     Prognosis: [] Good [] Fair  [] Poor    Patient Requires Follow-up: [x] Yes  [] No    PLAN: See eval  [x] Continue per plan of care [] Alter current plan (see comments)  [] Plan of care initiated [] Hold pending MD visit [] Discharge    Electronically signed by: Nga Wilson PT

## 2019-04-11 ENCOUNTER — HOSPITAL ENCOUNTER (OUTPATIENT)
Dept: PHYSICAL THERAPY | Age: 64
Setting detail: THERAPIES SERIES
Discharge: HOME | End: 2019-04-11
Payer: MEDICARE

## 2019-04-11 PROCEDURE — 97110 THERAPEUTIC EXERCISES: CPT

## 2019-04-11 PROCEDURE — 97112 NEUROMUSCULAR REEDUCATION: CPT

## 2019-04-11 PROCEDURE — 97016 VASOPNEUMATIC DEVICE THERAPY: CPT

## 2019-04-11 NOTE — FLOWSHEET NOTE
[address] [facility]    Physical Therapy Daily Treatment Note  Date:  2019    Patient Name:  Paul Albright  :  1955  MRN: [id number]  Restrictions/Precautions:    Medical/Treatment Diagnosis Information:  · Diagnosis: PT: L TKR revision (19)  · Treatment Diagnosis: L knee pain secondary to TKR revision M25.562/ M18.94  Insurance/Certification information:  PT Insurance Information: Medicare/ AARP  Physician Information:  Referring Practitioner: Richard Reichr of care signed (Y/N):     Date of Patient follow up with Physician:     G-Code (if applicable):      Date G-Code Applied:         Progress Note: [x]  Yes  []  No  Next due by: Visit #10       Latex Allergy:  [x]NO      []YES  Preferred Language for Healthcare:   [x]English       []other:    Visit # Insurance Allowable   3 Med Nec     Pain level:  7/10     SUBJECTIVE:  Thinks he overdid it yesterday, walked incline 4-5 times. Stated felt really sore and swollen last night, put EMU and ice on it and stated felt better, swelling went down. Feels better today.     OBJECTIVE: See eval  Observation:   Test measurements:  L knee AROM: 0 degrees-127 degrees 19    RESTRICTIONS/PRECAUTIONS: LATEX ALLERGY    Exercises/Interventions:     Therapeutic Ex Sets/sec Notes HEP   Belt stretch  Seated HSS 30\"x3  30\"x3     Heel prop  Heel slides 1'  x10     QS  SLR  SSLR x10  x30  2x10     Standing ABD/EXT  Step Ups  SLS x30 ea  4\"x20  10\"x10     LAQ  HS curls  HR's 3# x30  x30 Blue  x30     Bike ROM 6'     Manual therapy 5' JASMINE Flood for flex/ext                                                          Manual Intervention                                          NMR re-education                                                                Therapeutic Exercise and NMR EXR  [x] (76987) Provided verbal/tactile cueing for activities related to strengthening, flexibility, endurance, ROM for improvements knee    Charges:  Timed Code Treatment Minutes: 45   Total Treatment Minutes: 55     [] EVAL LOW 21908  [x] UP(97369) x  2   [] IONTO  [x] NMR (77911) x  1   [x] VASO  [] Manual (09938) x       [] Other:  [] TA x       [] Mech Traction (43559)  [] ES(attended) (92136)      [] ES (un) (45339):     GOALS:   Patient stated goal: \"Return to normal\"    Therapist goals for Patient:   Short Term Goals: To be achieved in: 2 weeks  1. Independent in HEP and progression per patient tolerance, in order to prevent re-injury. 2. Patient will have a decrease in pain to facilitate improvement in movement, function, and ADLs as indicated by Functional Deficits. Long Term Goals: To be achieved in: 12 weeks  1. Disability index score of 30% or less for the LEFS to assist with reaching prior level of function. 2. Patient will demonstrate increased AROM to 0-120 to allow for proper joint functioning as indicated by patients Functional Deficits. 3. Patient will demonstrate an increase in Strength to good proximal hip strength and control, within 5lb HHD in LE to allow for proper functional mobility as indicated by patients Functional Deficits. 4. Patient will return to full functional activities without increased symptoms or restriction including ability to ambulate I without gait abnormality. 5. Patient will be able to perform bed mobility and stand from seated position pain free to return to ADLs. Progression Towards Functional goals:  [] Patient is progressing as expected towards functional goals listed. [] Progression is slowed due to complexities listed. [x] Progression has been slowed due to co-morbidities. [] Plan just implemented, too soon to assess goals progression  [] Other: Sees Cardiologist 4/16     ASSESSMENT:  Poor stamina secondary to shortness of breath (awaiting cardiologist appointment to implement more strenuous strengthening exercises.) Pt reported occasional muscle spasms throughout session. Stated that is normal for him, has frequent muscle spasms/cramps. Very gradual increase reps and resistance.     Treatment/Activity Tolerance:  [] Patient tolerated treatment well [] Patient limited by fatique  [] Patient limited by pain  [x] Patient limited by other medical complications  [] Other:     Prognosis: [] Good [] Fair  [] Poor    Patient Requires Follow-up: [x] Yes  [] No    PLAN: See eval  [x] Continue per plan of care [] Alter current plan (see comments)  [] Plan of care initiated [] Hold pending MD visit [] Discharge    Electronically signed by: Lukas Bourgeois PT, DPT

## 2019-04-11 NOTE — PROGRESS NOTES
[facility], [place]   Cardiac Electrophysiology Consultation   Date: 4/16/2019  Reason for Consultation:  Palpitations   Consult Requesting Physician: No att. providers found     Chief Complaint:   Chief Complaint   Patient presents with    Palpitations    Tachycardia      HPI: Shanta Tinajero is a 70 y.o. with PMH of anxiety, HTN, HLD, and palpitations. For the past year, he have ongoing shortness of breath, easy fatigability and palpitations with minimal activities. His transthoracic echocardiogram was within normal limits. His stress test was within normal limits. He had a detailed evaluation by pulmonology and not felt to be significant from pulmonology standpoint. Event monitor (9/2018) showed SR/ST with rates ranging from  bpm, symptoms correlating with sinus. Patient continued to have ongoing palpitations and exertional difficulty breathing. His repeat Holter monitor showed sinus tach and nonsustained atrial tachycardia. Holter (3/2019) showed sinus with rates ranging from  bpm and brief NSAT. Sleep study (3/30/19) showed mild TITUS, recommending smoking cessation and weight loss. PFT's (3/27/19) were unrevealing. Interval History: Today, ECG is SR at 75 bpm.  He reports an episode of exerting himself in his garage, HR was 155 bpm.  He was so exhausted, he collapsed onto the couch, and feels he passed out. He is unsure if he lost consciousness. His wife reports that he became cold and clammy. He did not record a BP at this time. He had a similar episode in 3/2019 when he became SOB and diaphoretic after walking from car into his home and \"passed out on the bed\". He also becomes SOB with minimal activity. Currently on metoprolol 25 mg BID with some symptomatic improvement. Admits to only drinking about 12 oz of water per day. Interestingly, he also correlates that most of the symptoms after initiating on Flomax.     Past Medical History:   Diagnosis Date    Anxiety severe pre-op Anxiety    Arthritis     knee    Bladder distention     Patient sees Dr. Carlotta Garcia HTN (hypertension) 2/18/2019    Hyperlipidemia     Irregular heart beat     hx of     Knee joint dislocation     left    Urinary incontinence     wears depends    Urinary retention     self cath 3x day/ resolved 11/2018    Wears dentures     full        Past Surgical History:   Procedure Laterality Date    CHOLECYSTECTOMY      CYSTOSCOPY      KNEE ARTHROSCOPY Left 03/03/2017     LEFT KNEE ARTHROSCOPY, SYNOVECTOMY      PROSTATE SURGERY      REVISION TOTAL KNEE ARTHROPLASTY Left 2/18/2019    REVISION LEFT TOTAL KNEE ARTHROPLASTY               LOREDO performed by Kishore Keith MD at [facility] Left 2017       Allergies: Allergies   Allergen Reactions    Other Anaphylaxis    Percocet [Oxycodone-Acetaminophen] Shortness Of Breath     Felt like a heart attack  Felt like a heart attack      Morphine Other (See Comments)     Other reaction(s): Other (See Comments)  Hallucinations  Hallucinations  Lights me up    Morphine And Related Other (See Comments)    Nickel     Propoxyphene      Darvocet       Medication:   Prior to Admission medications    Medication Sig Start Date End Date Taking? Authorizing Provider   Tiotropium Bromide-Olodaterol (STIOLTO RESPIMAT) 2.5-2.5 MCG/ACT AERS Inhale 2 puffs into the lungs daily 4/5/19 5/5/19 Yes Efrain Moses MD   levalbuterol [facility]) 45 MCG/ACT inhaler Inhale 2 puffs into the lungs every 4 hours as needed for Wheezing 3/22/19 3/21/20 Yes Efrain Moses MD   cyclobenzaprine (FLEXERIL) 10 MG tablet Take 10 mg by mouth 3 times daily 3/21/19  Yes Historical Provider, MD   metoprolol tartrate (LOPRESSOR) 25 MG tablet Take 1 tablet by mouth 3 times daily 3/21/19  Yes Nira Essex, MD   traMADol (ULTRAM) 50 MG tablet Take 50 mg by mouth every 6 hours as needed for Pain.    Yes Historical Provider, MD   tamsulosin (FLOMAX) 0.4 MG capsule Take 0.4 mg by mouth daily  7/17/18  Yes Historical Provider, MD   omeprazole (PRILOSEC) 40 MG delayed release capsule Take 40 mg by mouth daily  3/27/18  Yes Historical Provider, MD       Social History:   reports that he has been smoking cigarettes. He has a 15.00 pack-year smoking history. He has never used smokeless tobacco. He reports that he does not drink alcohol or use drugs. Family History:  family history includes Alcohol Abuse in his brother; Alzheimer's Disease in his maternal grandmother; Arthritis in his sister; Cancer in his maternal grandmother; Diabetes in his paternal grandmother; Heart Disease in his mother; Other in his sister and sister; Thyroid Disease in his father. Reviewed. Denies family history of sudden cardiac death, arrhythmia, premature CAD    Review of System:    · General ROS: negative for - chills, fever   · Psychological ROS: negative for - depression  · Ophthalmic ROS: negative for - eye pain or loss of vision  · ENT ROS: negative for - epistaxis, headaches, nasal discharge, sore throat   · Allergy and Immunology ROS: negative for - hives, nasal congestion   · Hematological and Lymphatic ROS: negative for - bleeding problems, blood clots, bruising or jaundice  · Endocrine ROS: negative for - skin changes, temperature intolerance or unexpected weight changes  · Respiratory ROS: negative for - cough, hemoptysis, pleuritic pain, sputum changes or wheezing  · Cardiovascular ROS: Per HPI.    · Gastrointestinal ROS: negative for - abdominal pain, blood in stools, diarrhea, hematemesis, melena, nausea/vomiting or swallowing difficulty/pain  · Genito-Urinary ROS: negative for - dysuria or incontinence  · Musculoskeletal ROS: negative for - joint swelling or muscle pain  · Neurological ROS: negative for - confusion, dizziness, gait disturbance, headaches, numbness/tingling, seizures, speech problems, tremors, visual changes or weakness  · Dermatological ROS: negative for - rash    Physical Examination:  Vitals:    04/16/19 0909   BP: 122/76   Pulse: 75       · Constitutional: Oriented. No distress. · Head: Normocephalic and atraumatic. · Mouth/Throat: Oropharynx is clear and moist.   · Eyes: Conjunctivae normal. EOM are normal.   · Neck: Normal range of motion. Neck supple. No rigidity. No JVD present. · Cardiovascular: Normal rate, regular rhythm, S1&S2 and intact distal pulses. · Pulmonary/Chest: Bilateral respiratory sounds. No wheezes. No rhonchi. · Abdominal: Soft. Bowel sounds present. No distension, No tenderness. · Musculoskeletal: No tenderness. No edema    · Lymphadenopathy: Has no cervical adenopathy. · Neurological: Alert and oriented. Cranial nerve appears intact, No Gross deficit   · Skin: Skin is warm and dry. No rash noted. · Psychiatric: Has a normal mood, affect and behavior     Labs:  Reviewed. ECG: reviewed, Sinus  Rhythm, with QRS duration 96 ms. No pathologic Q waves, ventricular pre-excitation, or QT prolongation. Studies:   1. Event monitor (9/14-9/2018):  SR, average high HR of 120, average low HR of 60.  7 pt triggered events correlating with sinus         Holter (3/21/19):  SR with average HR 80 (), 1 episode of NSAT for 4 bts, no pauses, no AF, no VT      2. Echo 3/27/19:    Summary   Normal LV systolic function with EF of 55-60%.   No regional wall motion abnormalites are seen.   Grade I diastolic dysfunction with normal filling pressure.   Mild mitral regurgitation.   No TR jet to estimate systolic pulmonary artery pressure (SPAP). Probably   normal.    3. Myoview 3/20/18:    No evidence of stress-induced reversible myocardial ischemia.         4. Cath 10/17/12, Dr. Dario Okeefe:   IMPRESSION:  1. Normal left ventricular systolic function. 2.  No significant obstructive coronary artery disease. 3.  Successful Angio-Seal of right femoral arteriotomy site.     The MCOT, echocardiogram, stress test, and coronary angiography/PCI were reviewed by myself and used for my plan of care. Procedure  1. Implant loop recorder    Assessment/Plan:   1. Tachycardia  Inappropriate sinus tachycardia  Little better after being on metoprolol 25 mg  However, continues to have recurrent episodes of sinus tachycardia and incapacitating him  Started him on Ivabradine 5 mg BID, along with metoprolol      2. SOB  SOB with minimal exertion, prior to and after knee replacement  Active smoker  PFT's unrevealing  Echo, EF 43-31%, grade I diastolic dysfunction  Encouraged smoking cessation    3. TITUS  Mild, sleep physician advised smoking cessation and weight loss    4. Syncope  Neurocardiogenic versus cardiac arrhythmia  Encouraged to obtain a BP during episodes of syncope  Encouraged increase in hydration, at least 90 oz of fluids per day  Encouraged salt intake  Start ivabradine 5 mg BID  Stop Flomax. If urinary symptoms return, take the Flomax in the evening instead of the morning. No caffeine  Recommended LinQ implantation due to syncope and palpitations    Thank you for allowing me to participate in the care of Zach Mcdonald. All questions and concerns were addressed to the patient/family. Alternatives to my treatment were discussed. Heber Sellers RN, am scribing for and in the presence of Dr. Ramo Goldberg. 04/16/19 9:22 AM  ALONSO Choi MD, personally performed the services prescribed in this documentation as scribed by Ms. Pascale Prasad RN in my presence and it is both accurate and complete.          Parvez Reis MD  Cardiac Electrophysiology  [facility]

## 2019-04-16 ENCOUNTER — OFFICE VISIT (OUTPATIENT)
Dept: CARDIOLOGY CLINIC | Age: 64
End: 2019-04-16
Payer: MEDICARE

## 2019-04-16 VITALS
HEART RATE: 75 BPM | DIASTOLIC BLOOD PRESSURE: 76 MMHG | WEIGHT: 196.6 LBS | HEIGHT: 67 IN | SYSTOLIC BLOOD PRESSURE: 122 MMHG | BODY MASS INDEX: 30.86 KG/M2

## 2019-04-16 DIAGNOSIS — R00.2 PALPITATIONS: ICD-10-CM

## 2019-04-16 DIAGNOSIS — G47.33 OSA (OBSTRUCTIVE SLEEP APNEA): ICD-10-CM

## 2019-04-16 DIAGNOSIS — R00.0 TACHYCARDIA: Primary | ICD-10-CM

## 2019-04-16 DIAGNOSIS — R06.09 DYSPNEA ON EXERTION: ICD-10-CM

## 2019-04-16 PROCEDURE — 93000 ELECTROCARDIOGRAM COMPLETE: CPT | Performed by: INTERNAL MEDICINE

## 2019-04-16 PROCEDURE — 99214 OFFICE O/P EST MOD 30 MIN: CPT | Performed by: INTERNAL MEDICINE

## 2019-04-16 NOTE — PATIENT INSTRUCTIONS
Implantable Loop Recorder    Date of Procedure:  4/18/19    Time of Arrival:  9:30 am    Cardiologist performing procedure:  Dr. Lazarus Dynes    · Arrive at [facility] through the main entrance. Check in at the Outpatient Diagnostic desk on the 1st floor. · No food or drink for 2 hours before your procedure. · You may brush your teeth and rinse the morning of the procedure. · Take ALL of your routine medications the morning of the procedure. · Do NOT stop taking aspirin, Plavix, coumadin, Eliquis, Xarelto, or Pradaxa (any blood thinners)    · Please use Hibiclens soap to wash your neck, chest, and abdomen the night before your procedure. · Do not apply any lotion, deodorant, or powder the morning of the procedure. · Please bring a list of your medications to the hospital with you. · You may drive yourself home. · If you are unable to make this appointment, please call [facility] at [phone].

## 2019-04-16 NOTE — LETTER
[address] [place] [facility]  EP Procedure Sheet  4/16/19    Gale Shallow  1955    Physician: Dr. Walter Gimenez    EP Procedures   Pacemaker implant  EP Study    ICD implant  Atrial flutter ablation     Biv implant  Atrial fibrillation ablation    Generator Change  SVT ablation    Lead revision  VT ablation    Lead extraction +/- upgrade  AVN ablation   x Loop implant  Cardioversion     Other:   CHETNA     Equipment  x Medtronic   ESTHER Mapping System    [place]  [address] [address]  CryoAblation    Biotronik  Laser Lead Extraction    Special Equipment       EP Procedures Scheduling Request  Time Requested  1100   Specific Day 4/18/19   Anesthesia    CT surgery backup    Location [facility]     Pre-Procedure Labs / Imaging   PT/INR  Type & cross    CBC  Units PRBC    BMP/Mg  Units FFP    Venogram  CXR    Echo  Pulmonary CTA for Pulmonary vein mapping     Patient Instructions    Date of last admission: > 30 days

## 2019-04-17 ENCOUNTER — PRE-PROCEDURE TELEPHONE (OUTPATIENT)
Dept: CARDIAC CATH/INVASIVE PROCEDURES | Age: 64
End: 2019-04-17

## 2019-04-18 ENCOUNTER — HOSPITAL ENCOUNTER (OUTPATIENT)
Dept: CARDIAC CATH/INVASIVE PROCEDURES | Age: 64
Discharge: HOME | End: 2019-04-18
Attending: INTERNAL MEDICINE | Admitting: INTERNAL MEDICINE
Payer: MEDICARE

## 2019-04-18 VITALS — WEIGHT: 190 LBS | BODY MASS INDEX: 29.82 KG/M2 | HEIGHT: 67 IN | TEMPERATURE: 97.5 F

## 2019-04-18 DIAGNOSIS — M25.562 LEFT KNEE PAIN, UNSPECIFIED CHRONICITY: Primary | ICD-10-CM

## 2019-04-18 PROBLEM — Z45.09 ENCOUNTER FOR ELECTRONIC ANALYSIS OF REVEAL EVENT RECORDER: Status: ACTIVE | Noted: 2019-04-18

## 2019-04-18 LAB
ANION GAP SERPL CALCULATED.3IONS-SCNC: 13 MMOL/L (ref 3–16)
BUN BLDV-MCNC: 12 MG/DL (ref 7–20)
CALCIUM SERPL-MCNC: 9.2 MG/DL (ref 8.3–10.6)
CHLORIDE BLD-SCNC: 105 MMOL/L (ref 99–110)
CO2: 21 MMOL/L (ref 21–32)
CREAT SERPL-MCNC: 0.7 MG/DL (ref 0.8–1.3)
GFR AFRICAN AMERICAN: >60
GFR NON-AFRICAN AMERICAN: >60
GLUCOSE BLD-MCNC: 130 MG/DL (ref 70–99)
INR BLD: 1.03 (ref 0.86–1.14)
POTASSIUM SERPL-SCNC: 4.2 MMOL/L (ref 3.5–5.1)
PROTHROMBIN TIME: 11.7 SEC (ref 9.8–13)
SODIUM BLD-SCNC: 139 MMOL/L (ref 136–145)

## 2019-04-18 PROCEDURE — 85610 PROTHROMBIN TIME: CPT

## 2019-04-18 PROCEDURE — 80048 BASIC METABOLIC PNL TOTAL CA: CPT

## 2019-04-18 PROCEDURE — C1764 EVENT RECORDER, CARDIAC: HCPCS

## 2019-04-18 PROCEDURE — 33285 INSJ SUBQ CAR RHYTHM MNTR: CPT

## 2019-04-18 RX ORDER — SODIUM CHLORIDE 0.9 % (FLUSH) 0.9 %
10 SYRINGE (ML) INJECTION PRN
Status: DISCONTINUED | OUTPATIENT
Start: 2019-04-18 | End: 2019-04-18 | Stop reason: HOSPADM

## 2019-04-18 RX ORDER — SODIUM CHLORIDE 0.9 % (FLUSH) 0.9 %
10 SYRINGE (ML) INJECTION EVERY 12 HOURS SCHEDULED
Status: DISCONTINUED | OUTPATIENT
Start: 2019-04-18 | End: 2019-04-18 | Stop reason: HOSPADM

## 2019-04-18 RX ORDER — SODIUM CHLORIDE 9 MG/ML
INJECTION, SOLUTION INTRAVENOUS CONTINUOUS
Status: DISCONTINUED | OUTPATIENT
Start: 2019-04-18 | End: 2019-04-18 | Stop reason: HOSPADM

## 2019-04-18 NOTE — PROCEDURES
[address]     Electrophysiology Procedure Note       Date of Procedure: 4/18/2019  Patient's Name: Chico Wise  YOB: 1955   Medical Record Number: [id number]  Procedure Performed by: Ivette Khan MD.,    Procedures performed:    · Loop recorder implantation    Indication of the procedure: Syncope, Palpitation   Chico Wise is a 70 y.o. male with palpitations and syncope     Details of procedure:   Procedure's risks, benefits and alternatives of procedure were explained to patient. All questions were answered. Patient understood and informed consent was obtained. The patient was brought to the holding bay in a fasting nonsedated state. Patient was prepped and draped in usual sterile fashion. No moderate sedation (conscious sedation) nor general anesthesia was administered or utilized for this procedure. The patient was monitored continuously with ECG, pulse oximetry, blood pressure monitoring, and direct observation. After injection of 0.5% bupivacaine/lidocaine mixture in the left 4th intercostal space, a 5 mm small incision was made using the Medtronic-provided scalpel, after which a #11 blade was used to expand the opening of this incision on both ends. Then a Medtronic-provided tunneling tool was inserted into this scar in a diagonal trajectory towards the L nipple which created the necessary space to insert the implantable loop recorder. This tool was used to deliver the implantable loop recorder into the created space. Both the plunger and the tunneling tool was then retracted. The surgical scar was taped with Steri-strips and manual pressure was held over the taped area to achieve hemostasis. The patient tolerated the procedure well without any complications.     Device information:   The device is a [employer] Reveal LINQ [id number] # [id number] with implant date: 4/18/2019  The device was programmed to detect:   VT: 380 ms 16 beats   Bradycardia: 2000 ms   Pause : 3 seconds    Impression:    Pre- and post-procedural diagnoses of syncope with symptoms of palpitations with successful implantation of a Medtronic Implantable Loop Recorder. Plan:   The patient will have usual post-implant care with a 1-week wound check with our nurse in the [facility] at [facility]. The patient can be discharged home if the patient remains stable. Follow-up also includes routine device interrogation with the Device Clinic. Thank you for allowing us to participate in the care of your patient. If you have any questions or comments, please feel free to contact us.     Elsa Burnette MD   Cardiac Electrophysiology  [facility]

## 2019-04-19 ENCOUNTER — HOSPITAL ENCOUNTER (OUTPATIENT)
Dept: PHYSICAL THERAPY | Age: 64
Setting detail: THERAPIES SERIES
Discharge: HOME | End: 2019-04-19
Payer: MEDICARE

## 2019-04-19 PROCEDURE — 97140 MANUAL THERAPY 1/> REGIONS: CPT | Performed by: PHYSICAL THERAPIST

## 2019-04-19 PROCEDURE — 33285 INSJ SUBQ CAR RHYTHM MNTR: CPT | Performed by: INTERNAL MEDICINE

## 2019-04-19 PROCEDURE — 97016 VASOPNEUMATIC DEVICE THERAPY: CPT | Performed by: PHYSICAL THERAPIST

## 2019-04-19 PROCEDURE — 97110 THERAPEUTIC EXERCISES: CPT | Performed by: PHYSICAL THERAPIST

## 2019-04-19 NOTE — FLOWSHEET NOTE
[facility]    Physical Therapy Daily Treatment Note  Date:  2019    Patient Name:  Katharine Valadez  :  1955  MRN: [id number]  Restrictions/Precautions:    Medical/Treatment Diagnosis Information:  · Diagnosis: PT: L TKR revision (19)  · Treatment Diagnosis: L knee pain secondary to TKR revision M25.562/ X07.53  Insurance/Certification information:  PT Insurance Information: Medicare/ AARP  Physician Information:  Referring Practitioner: Aubrey Kay of care signed (Y/N):     Date of Patient follow up with Physician:     G-Code (if applicable):      Date G-Code Applied:         Progress Note: [x]  Yes  []  No  Next due by: Visit #10       Latex Allergy:  [x]NO      []YES  Preferred Language for Healthcare:   [x]English       []other:    Visit # Insurance Allowable   4 Med Nec     Pain level:  7/10     SUBJECTIVE:  Saw MD who was pleased with progress. Reports he is no longer walking with cane. Saw cardiologist who is concerned with his heart rhythm. Patient underwent loop recorder procedure yesterday, has FU with him in one week.      OBJECTIVE: See eval  Observation:   Test measurements:  L knee AROM: 0 degrees-127 degrees 19    RESTRICTIONS/PRECAUTIONS: LATEX ALLERGY    Exercises/Interventions:     Therapeutic Ex Sets/sec Notes HEP   Belt stretch  Seated HSS 30\"x3  30\"x3     Heel prop  Heel slides 1'  x10     QS  SLR  SSLR x30         Standing ABD/EXT  Step Ups  SLS        LAQ  HS curls  HR's 3# x30  x30 Blue  x30         Manual therapy 8' JASMINE Flood for flex/ext                                                          Manual Intervention                                          NMR re-education                                                                Therapeutic Exercise and NMR EXR  [x] (29518) Provided verbal/tactile cueing for activities related to strengthening, flexibility, endurance, ROM for improvements in LE, proximal hip, and core control with self care, mobility, lifting, ambulation.  [] (73617) Provided verbal/tactile cueing for activities related to improving balance, coordination, kinesthetic sense, posture, motor skill, proprioception  to assist with LE, proximal hip, and core control in self care, mobility, lifting, ambulation and eccentric single leg control. NMR and Therapeutic Activities:    [x] (93429 or 79143) Provided verbal/tactile cueing for activities related to improving balance, coordination, kinesthetic sense, posture, motor skill, proprioception and motor activation to allow for proper function of core, proximal hip and LE with self care and ADLs  [] (17841) Gait Re-education- Provided training and instruction to the patient for proper LE, core and proximal hip recruitment and positioning and eccentric body weight control with ambulation re-education including up and down stairs     Home Exercise Program:    [x] (13511) Reviewed/Progressed HEP activities related to strengthening, flexibility, endurance, ROM of core, proximal hip and LE for functional self-care, mobility, lifting and ambulation/stair navigation   [] (61526)Reviewed/Progressed HEP activities related to improving balance, coordination, kinesthetic sense, posture, motor skill, proprioception of core, proximal hip and LE for self care, mobility, lifting, and ambulation/stair navigation      Manual Treatments:  PROM / STM / Oscillations-Mobs:  G-I, II, III, IV (PA's, Inf., Post.)  [x] (25214) Provided manual therapy to mobilize LE, proximal hip and/or LS spine soft tissue/joints for the purpose of modulating pain, promoting relaxation,  increasing ROM, reducing/eliminating soft tissue swelling/inflammation/restriction, improving soft tissue extensibility and allowing for proper ROM for normal function with self care, mobility, lifting and ambulation.      Modalities:  VASO 10 minutes for swelling/ effusion to L knee    Charges:  Timed Code Treatment Minutes: 30   Total Treatment Minutes: 40     [] EVAL LOW 64407  [x] KQ(80000) x  1   [] IONTO  [] NMR (27796) x      [x] VASO  [x] Manual (87457) x       [] Other:  [] TA x       [] Mech Traction (35719)  [] ES(attended) (34222)      [] ES (un) (22156):     GOALS:   Patient stated goal: \"Return to normal\"    Therapist goals for Patient:   Short Term Goals: To be achieved in: 2 weeks  1. Independent in HEP and progression per patient tolerance, in order to prevent re-injury. 2. Patient will have a decrease in pain to facilitate improvement in movement, function, and ADLs as indicated by Functional Deficits. Long Term Goals: To be achieved in: 12 weeks  1. Disability index score of 30% or less for the LEFS to assist with reaching prior level of function. 2. Patient will demonstrate increased AROM to 0-120 to allow for proper joint functioning as indicated by patients Functional Deficits. 3. Patient will demonstrate an increase in Strength to good proximal hip strength and control, within 5lb HHD in LE to allow for proper functional mobility as indicated by patients Functional Deficits. 4. Patient will return to full functional activities without increased symptoms or restriction including ability to ambulate I without gait abnormality. 5. Patient will be able to perform bed mobility and stand from seated position pain free to return to ADLs. Progression Towards Functional goals:  [] Patient is progressing as expected towards functional goals listed. [] Progression is slowed due to complexities listed. [x] Progression has been slowed due to co-morbidities. [] Plan just implemented, too soon to assess goals progression  [] Other: Sees Cardiologist 4/16     ASSESSMENT:  Poor stamina secondary to shortness of breath, did not attempt strenuous exercises that would exert patient breathing or increase HR. Very gradual increase reps and resistance.     Treatment/Activity Tolerance:  [] Patient tolerated treatment well [] Patient limited by fatique  [] Patient limited by pain  [x] Patient limited by other medical complications  [] Other:     Prognosis: [] Good [] Fair  [] Poor    Patient Requires Follow-up: [x] Yes  [] No    PLAN: See eval  [x] Continue per plan of care [] Alter current plan (see comments)  [] Plan of care initiated [] Hold pending MD visit [] Discharge    Electronically signed by: Bro Hampton PT, DPT

## 2019-04-24 ENCOUNTER — APPOINTMENT (OUTPATIENT)
Dept: PHYSICAL THERAPY | Age: 64
End: 2019-04-24
Payer: MEDICARE

## 2019-04-25 ENCOUNTER — PROCEDURE VISIT (OUTPATIENT)
Dept: CARDIOLOGY CLINIC | Age: 64
End: 2019-04-25
Payer: MEDICARE

## 2019-04-25 DIAGNOSIS — Z45.09 ENCOUNTER FOR ELECTRONIC ANALYSIS OF REVEAL EVENT RECORDER: Primary | ICD-10-CM

## 2019-04-25 DIAGNOSIS — R00.2 HEART PALPITATIONS: ICD-10-CM

## 2019-04-25 PROCEDURE — 93291 INTERROG DEV EVAL SCRMS IP: CPT | Performed by: INTERNAL MEDICINE

## 2019-04-25 NOTE — PROGRESS NOTES
Interrogation of the device shows normal function. See interrogation for details. Incision healing well. Discussed [facility]. Recheck 1 mos.

## 2019-04-26 ENCOUNTER — HOSPITAL ENCOUNTER (OUTPATIENT)
Dept: PHYSICAL THERAPY | Age: 64
Setting detail: THERAPIES SERIES
Discharge: HOME | End: 2019-04-26
Payer: MEDICARE

## 2019-04-26 PROCEDURE — 97110 THERAPEUTIC EXERCISES: CPT | Performed by: PHYSICAL THERAPIST

## 2019-04-26 PROCEDURE — 97016 VASOPNEUMATIC DEVICE THERAPY: CPT | Performed by: PHYSICAL THERAPIST

## 2019-04-26 PROCEDURE — 97112 NEUROMUSCULAR REEDUCATION: CPT | Performed by: PHYSICAL THERAPIST

## 2019-04-26 NOTE — FLOWSHEET NOTE
[facility] [place] [facility]    Physical Therapy Daily Treatment Note  Date:  2019    Patient Name:  Delia Schwab  :  1955  MRN: [id number]  Restrictions/Precautions:    Medical/Treatment Diagnosis Information:  · Diagnosis: PT: L TKR revision (19)  · Treatment Diagnosis: L knee pain secondary to TKR revision M25.562/ E92.00  Insurance/Certification information:  PT Insurance Information: Medicare/ AARP  Physician Information:  Referring Practitioner: Evette Eldridge of care signed (Y/N):     Date of Patient follow up with Physician:     G-Code (if applicable):      Date G-Code Applied:         Progress Note: [x]  Yes  []  No  Next due by: Visit #10       Latex Allergy:  [x]NO      []YES  Preferred Language for Healthcare:   [x]English       []other:    Visit # Insurance Allowable   5 Med Nec     Pain level:  7/10     SUBJECTIVE:  Knee is more sore thinks its either the weather or walking more visiting friend in nursing home. States saw cardiologist and stated the 3-4 times he pushed button to record heart rate showed no change in EKG.      OBJECTIVE: See eval  Observation:   Test measurements:  L knee AROM: 0 degrees-127 degrees 19    RESTRICTIONS/PRECAUTIONS: LATEX ALLERGY    Exercises/Interventions:     Therapeutic Ex Sets/sec Notes HEP   Belt stretch  Seated HSS 30\"x3  30\"x3     Heel prop  Heel slides 1'  x10     QS  SLR  SSLR x30  x30       Standing ABD/EXT  Step Ups  SLS x30 ea       LAQ  HS curls  HR's 3# x30  x30 Blue  x30                                                               Manual Intervention                                          NMR re-education                                                                Therapeutic Exercise and NMR EXR  [x] (39962) Provided verbal/tactile cueing for activities related to strengthening, flexibility, endurance, ROM for improvements in LE, proximal hip, and core control with self care, mobility, lifting, ambulation.  [] (23662) Provided verbal/tactile cueing for activities related to improving balance, coordination, kinesthetic sense, posture, motor skill, proprioception  to assist with LE, proximal hip, and core control in self care, mobility, lifting, ambulation and eccentric single leg control. NMR and Therapeutic Activities:    [x] (18768 or 47962) Provided verbal/tactile cueing for activities related to improving balance, coordination, kinesthetic sense, posture, motor skill, proprioception and motor activation to allow for proper function of core, proximal hip and LE with self care and ADLs  [] (47694) Gait Re-education- Provided training and instruction to the patient for proper LE, core and proximal hip recruitment and positioning and eccentric body weight control with ambulation re-education including up and down stairs     Home Exercise Program:    [x] (76041) Reviewed/Progressed HEP activities related to strengthening, flexibility, endurance, ROM of core, proximal hip and LE for functional self-care, mobility, lifting and ambulation/stair navigation   [] (37146)Reviewed/Progressed HEP activities related to improving balance, coordination, kinesthetic sense, posture, motor skill, proprioception of core, proximal hip and LE for self care, mobility, lifting, and ambulation/stair navigation      Manual Treatments:  PROM / STM / Oscillations-Mobs:  G-I, II, III, IV (PA's, Inf., Post.)  [x] (30594) Provided manual therapy to mobilize LE, proximal hip and/or LS spine soft tissue/joints for the purpose of modulating pain, promoting relaxation,  increasing ROM, reducing/eliminating soft tissue swelling/inflammation/restriction, improving soft tissue extensibility and allowing for proper ROM for normal function with self care, mobility, lifting and ambulation.      Modalities:  VASO 10 minutes for swelling/ effusion to L knee    Charges:  Timed Code Treatment Minutes: 30   Total Treatment Minutes: 40     [] EVAL LOW 75867  [x] VK(26061) x  1   [] IONTO  [x] NMR (74501) x      [x] VASO  [] Manual (48223) x       [] Other:  [] TA x       [] Mech Traction (24833)  [] ES(attended) (80999)      [] ES (un) (04926):     GOALS:   Patient stated goal: \"Return to normal\"    Therapist goals for Patient:   Short Term Goals: To be achieved in: 2 weeks  1. Independent in HEP and progression per patient tolerance, in order to prevent re-injury. 2. Patient will have a decrease in pain to facilitate improvement in movement, function, and ADLs as indicated by Functional Deficits. Long Term Goals: To be achieved in: 12 weeks  1. Disability index score of 30% or less for the LEFS to assist with reaching prior level of function. 2. Patient will demonstrate increased AROM to 0-120 to allow for proper joint functioning as indicated by patients Functional Deficits. 3. Patient will demonstrate an increase in Strength to good proximal hip strength and control, within 5lb HHD in LE to allow for proper functional mobility as indicated by patients Functional Deficits. 4. Patient will return to full functional activities without increased symptoms or restriction including ability to ambulate I without gait abnormality. 5. Patient will be able to perform bed mobility and stand from seated position pain free to return to ADLs. Progression Towards Functional goals:  [] Patient is progressing as expected towards functional goals listed. [] Progression is slowed due to complexities listed. [x] Progression has been slowed due to co-morbidities. [] Plan just implemented, too soon to assess goals progression  [] Other: Sees Cardiologist 4/16     ASSESSMENT:  Poor stamina secondary to shortness of breath, did not attempt strenuous exercises that would exert patient breathing or increase HR. Educated on frequent rest breaks and monitoring cardiac symptoms with activity.  Very gradual increase reps and

## 2019-05-01 ENCOUNTER — HOSPITAL ENCOUNTER (OUTPATIENT)
Dept: PHYSICAL THERAPY | Age: 64
Setting detail: THERAPIES SERIES
Discharge: HOME | End: 2019-05-01
Payer: MEDICARE

## 2019-05-01 PROCEDURE — 97016 VASOPNEUMATIC DEVICE THERAPY: CPT | Performed by: PHYSICAL THERAPIST

## 2019-05-01 PROCEDURE — 97110 THERAPEUTIC EXERCISES: CPT | Performed by: PHYSICAL THERAPIST

## 2019-05-01 PROCEDURE — 97112 NEUROMUSCULAR REEDUCATION: CPT | Performed by: PHYSICAL THERAPIST

## 2019-05-01 NOTE — FLOWSHEET NOTE
[facility]    [facility] Therapy Daily Treatment Note  Date:  2019    Patient Name:  Kassandra Lopez  :  1955  MRN: [id number]  Restrictions/Precautions:    Medical/Treatment Diagnosis Information:  · Diagnosis: PT: L TKR revision (19)  · Treatment Diagnosis: L knee pain secondary to TKR revision M25.562/ Z04.11  Insurance/Certification information:  PT Insurance Information: Medicare/ AARP  Physician Information:  Referring Practitioner: Yaneth Fajardo of care signed (Y/N):     Date of Patient follow up with Physician:     G-Code (if applicable):      Date G-Code Applied:         Progress Note: [x]  Yes  []  No  Next due by: Visit #10       Latex Allergy:  [x]NO      []YES  Preferred Language for Healthcare:   [x]English       []other:    Visit # Insurance Allowable   6 Med Nec     Pain level:  7/10     SUBJECTIVE:  Knee pain increased (having more tenderness) this morning with no known reason- thinks it may be weather related. Reports he has only used loop recorder twice since last PT visit.      OBJECTIVE: See eval  Observation:   Test measurements:  L knee AROM: 0 degrees-127 degrees 19    RESTRICTIONS/PRECAUTIONS: LATEX ALLERGY    Exercises/Interventions:     Therapeutic Ex Sets/sec Notes HEP   Belt stretch  Seated HSS 30\"x3  30\"x3     Heel prop  Heel slides 1'  x10     QS  SLR  SSLR x30  x30       Standing ABD/EXT  Step Ups  SLS  TKE x30 ea  10\"x10  x20 Blue     LAQ  HS curls  HR's 3# x30  x30 Blue  x30                                                               Manual Intervention                                          NMR re-education                                                                Therapeutic Exercise and NMR EXR  [x] (65700) Provided verbal/tactile cueing for activities related to strengthening, flexibility, endurance, ROM for improvements in LE, proximal hip, and core control with self care, mobility, lifting, ambulation.  [] (48347) Provided verbal/tactile cueing for activities related to improving balance, coordination, kinesthetic sense, posture, motor skill, proprioception  to assist with LE, proximal hip, and core control in self care, mobility, lifting, ambulation and eccentric single leg control. NMR and Therapeutic Activities:    [x] (22505 or 57658) Provided verbal/tactile cueing for activities related to improving balance, coordination, kinesthetic sense, posture, motor skill, proprioception and motor activation to allow for proper function of core, proximal hip and LE with self care and ADLs  [] (14748) Gait Re-education- Provided training and instruction to the patient for proper LE, core and proximal hip recruitment and positioning and eccentric body weight control with ambulation re-education including up and down stairs     Home Exercise Program:    [x] (71404) Reviewed/Progressed HEP activities related to strengthening, flexibility, endurance, ROM of core, proximal hip and LE for functional self-care, mobility, lifting and ambulation/stair navigation   [] (36474)Reviewed/Progressed HEP activities related to improving balance, coordination, kinesthetic sense, posture, motor skill, proprioception of core, proximal hip and LE for self care, mobility, lifting, and ambulation/stair navigation      Manual Treatments:  PROM / STM / Oscillations-Mobs:  G-I, II, III, IV (PA's, Inf., Post.)  [x] (30443) Provided manual therapy to mobilize LE, proximal hip and/or LS spine soft tissue/joints for the purpose of modulating pain, promoting relaxation,  increasing ROM, reducing/eliminating soft tissue swelling/inflammation/restriction, improving soft tissue extensibility and allowing for proper ROM for normal function with self care, mobility, lifting and ambulation.      Modalities:  VASO 10 minutes for swelling/ effusion to L knee    Charges:  Timed Code Treatment Minutes: 38   Total Treatment Minutes: 48 [] EVAL LOW 39490  [x] NP(30613) x  1   [] IONTO  [x] NMR (06374) x      [x] VASO  [] Manual (68012) x       [] Other:  [] TA x       [] Mech Traction (37035)  [] ES(attended) (10115)      [] ES (un) (19257):     GOALS:   Patient stated goal: \"Return to normal\"    Therapist goals for Patient:   Short Term Goals: To be achieved in: 2 weeks  1. Independent in HEP and progression per patient tolerance, in order to prevent re-injury. 2. Patient will have a decrease in pain to facilitate improvement in movement, function, and ADLs as indicated by Functional Deficits. Long Term Goals: To be achieved in: 12 weeks  1. Disability index score of 30% or less for the LEFS to assist with reaching prior level of function. 2. Patient will demonstrate increased AROM to 0-120 to allow for proper joint functioning as indicated by patients Functional Deficits. 3. Patient will demonstrate an increase in Strength to good proximal hip strength and control, within 5lb HHD in LE to allow for proper functional mobility as indicated by patients Functional Deficits. 4. Patient will return to full functional activities without increased symptoms or restriction including ability to ambulate I without gait abnormality. 5. Patient will be able to perform bed mobility and stand from seated position pain free to return to ADLs. Progression Towards Functional goals:  [] Patient is progressing as expected towards functional goals listed. [] Progression is slowed due to complexities listed. [x] Progression has been slowed due to co-morbidities. [] Plan just implemented, too soon to assess goals progression  [] Other:     ASSESSMENT:  Poor stamina secondary to shortness of breath, did not attempt strenuous exercises that would exert patient breathing or increase HR. Educated on frequent rest breaks and monitoring cardiac symptoms with activity. Very gradual increase reps and resistance.     Treatment/Activity Tolerance:  [] Patient tolerated treatment well [] Patient limited by fatique  [] Patient limited by pain  [x] Patient limited by other medical complications  [] Other:     Prognosis: [] Good [] Fair  [] Poor    Patient Requires Follow-up: [x] Yes  [] No    PLAN: See eval  [x] Continue per plan of care [] Alter current plan (see comments)  [] Plan of care initiated [] Hold pending MD visit [] Discharge    Electronically signed by: Tiburcio Farrar PT, DPT

## 2019-05-03 ENCOUNTER — HOSPITAL ENCOUNTER (OUTPATIENT)
Dept: PHYSICAL THERAPY | Age: 64
Setting detail: THERAPIES SERIES
End: 2019-05-03
Payer: MEDICARE

## 2019-05-08 ENCOUNTER — TELEPHONE (OUTPATIENT)
Dept: FAMILY MEDICINE CLINIC | Age: 64
End: 2019-05-08

## 2019-05-08 ENCOUNTER — HOSPITAL ENCOUNTER (OUTPATIENT)
Dept: PHYSICAL THERAPY | Age: 64
Setting detail: THERAPIES SERIES
Discharge: HOME | End: 2019-05-08
Payer: MEDICARE

## 2019-05-08 PROCEDURE — 97112 NEUROMUSCULAR REEDUCATION: CPT | Performed by: PHYSICAL THERAPIST

## 2019-05-08 PROCEDURE — 97110 THERAPEUTIC EXERCISES: CPT | Performed by: PHYSICAL THERAPIST

## 2019-05-08 PROCEDURE — 97016 VASOPNEUMATIC DEVICE THERAPY: CPT | Performed by: PHYSICAL THERAPIST

## 2019-05-08 NOTE — TELEPHONE ENCOUNTER
Urology needs to make that call with what else may help is prostate.  We always use urology group- he could check with [facility] I just don't know any of them

## 2019-05-08 NOTE — TELEPHONE ENCOUNTER
Patient called and states that his cardiologist took him off Flomax due to the side effects it was causing with his heart. Patient wanted to know if [facility] could send in a different medication for his prostate. Patient states that he is not happy with the urology group that he was being seen at. Please refer him to a different group if [facility] does not want to send in a different medication. Please advise.

## 2019-05-08 NOTE — FLOWSHEET NOTE
[facility]    [facility] Therapy Daily Treatment Note  Date:  2019    Patient Name:  Paul Albright  :  1955  MRN: [id number]  Restrictions/Precautions:    Medical/Treatment Diagnosis Information:  · Diagnosis: PT: L TKR revision (19)  · Treatment Diagnosis: L knee pain secondary to TKR revision M25.562/ I09.50  Insurance/Certification information:  PT Insurance Information: Medicare/ AARP  Physician Information:  Referring Practitioner: Richard Oar of care signed (Y/N):     Date of Patient follow up with Physician:     G-Code (if applicable):      Date G-Code Applied:         Progress Note: [x]  Yes  []  No  Next due by: Visit #10       Latex Allergy:  [x]NO      []YES  Preferred Language for Healthcare:   [x]English       []other:    Visit # Insurance Allowable   7 Med Nec     Pain level:  7/10      SUBJECTIVE:  Reports knee and breathing/ heart palpitations both feel better.  States he was on his feet too long 2 days ago and reports spasms in his distal quad that improved with ice and rest.     OBJECTIVE: See eval  Observation:   Test measurements:  L knee AROM: 0 degrees-127 degrees 19    RESTRICTIONS/PRECAUTIONS: LATEX ALLERGY    Exercises/Interventions:     Therapeutic Ex Sets/sec Notes HEP   Belt stretch  Seated HSS 30\"x3  30\"x3     Heel prop  Heel slides 1'  x10     QS  SLR  SSLR x30  x30       Standing ABD/EXT  Step Ups  SLS  TKE x30 ea  10\"x10  x20 Blue     LAQ  HS curls  HR's 10# x30  x30 25#  x30     Bike 5' Vitals monitored                                                          Manual Intervention                                          NMR re-education                                                                Therapeutic Exercise and NMR EXR  [x] (26366) Provided verbal/tactile cueing for activities related to strengthening, flexibility, endurance, ROM for improvements in LE, proximal hip, and core control with self care, mobility, lifting, ambulation.  [] (72547) Provided verbal/tactile cueing for activities related to improving balance, coordination, kinesthetic sense, posture, motor skill, proprioception  to assist with LE, proximal hip, and core control in self care, mobility, lifting, ambulation and eccentric single leg control. NMR and Therapeutic Activities:    [x] (02700 or 31371) Provided verbal/tactile cueing for activities related to improving balance, coordination, kinesthetic sense, posture, motor skill, proprioception and motor activation to allow for proper function of core, proximal hip and LE with self care and ADLs  [] (20269) Gait Re-education- Provided training and instruction to the patient for proper LE, core and proximal hip recruitment and positioning and eccentric body weight control with ambulation re-education including up and down stairs     Home Exercise Program:    [x] (61059) Reviewed/Progressed HEP activities related to strengthening, flexibility, endurance, ROM of core, proximal hip and LE for functional self-care, mobility, lifting and ambulation/stair navigation   [] (31696)Reviewed/Progressed HEP activities related to improving balance, coordination, kinesthetic sense, posture, motor skill, proprioception of core, proximal hip and LE for self care, mobility, lifting, and ambulation/stair navigation      Manual Treatments:  PROM / STM / Oscillations-Mobs:  G-I, II, III, IV (PA's, Inf., Post.)  [x] (68180) Provided manual therapy to mobilize LE, proximal hip and/or LS spine soft tissue/joints for the purpose of modulating pain, promoting relaxation,  increasing ROM, reducing/eliminating soft tissue swelling/inflammation/restriction, improving soft tissue extensibility and allowing for proper ROM for normal function with self care, mobility, lifting and ambulation.      Modalities:  VASO 10 minutes for swelling/ effusion to L knee    Charges:  Timed Code Treatment Minutes: 40 Total Treatment Minutes: 50     [] EVAL LOW 29806  [x] XJ(98236) x  2   [] IONTO  [x] NMR (06898) x      [x] VASO  [] Manual (50448) x       [] Other:  [] TA x       [] Mech Traction (67472)  [] ES(attended) (50511)      [] ES (un) (22557):     GOALS:   Patient stated goal: \"Return to normal\"    Therapist goals for Patient:   Short Term Goals: To be achieved in: 2 weeks  1. Independent in HEP and progression per patient tolerance, in order to prevent re-injury. 2. Patient will have a decrease in pain to facilitate improvement in movement, function, and ADLs as indicated by Functional Deficits. Long Term Goals: To be achieved in: 12 weeks  1. Disability index score of 30% or less for the LEFS to assist with reaching prior level of function. 2. Patient will demonstrate increased AROM to 0-120 to allow for proper joint functioning as indicated by patients Functional Deficits. 3. Patient will demonstrate an increase in Strength to good proximal hip strength and control, within 5lb HHD in LE to allow for proper functional mobility as indicated by patients Functional Deficits. 4. Patient will return to full functional activities without increased symptoms or restriction including ability to ambulate I without gait abnormality. 5. Patient will be able to perform bed mobility and stand from seated position pain free to return to ADLs. Progression Towards Functional goals:  [] Patient is progressing as expected towards functional goals listed. [x] Progression is slowed due to complexities listed. [x] Progression has been slowed due to co-morbidities. [] Plan just implemented, too soon to assess goals progression  [] Other:     ASSESSMENT:  HR, BP, and O2 stats taken following bike with values WNL. Poor stamina secondary to shortness of breath, did not attempt strenuous exercises that would exert patient breathing or increase HR.  Educated on frequent rest breaks and monitoring cardiac symptoms with

## 2019-05-10 ENCOUNTER — HOSPITAL ENCOUNTER (OUTPATIENT)
Dept: PHYSICAL THERAPY | Age: 64
Setting detail: THERAPIES SERIES
Discharge: HOME | End: 2019-05-10
Payer: MEDICARE

## 2019-05-10 PROCEDURE — 97016 VASOPNEUMATIC DEVICE THERAPY: CPT | Performed by: PHYSICAL THERAPIST

## 2019-05-10 PROCEDURE — 97112 NEUROMUSCULAR REEDUCATION: CPT | Performed by: PHYSICAL THERAPIST

## 2019-05-10 PROCEDURE — 97110 THERAPEUTIC EXERCISES: CPT | Performed by: PHYSICAL THERAPIST

## 2019-05-10 NOTE — FLOWSHEET NOTE
[facility]    Physical Therapy Daily Treatment Note  Date:  5/10/2019    Patient Name:  Albina Zuleta  :  1955  MRN: [id number]  Restrictions/Precautions:    Medical/Treatment Diagnosis Information:  · Diagnosis: PT: L TKR revision (19)  · Treatment Diagnosis: L knee pain secondary to TKR revision M25.562/ Q77.30  Insurance/Certification information:  PT Insurance Information: Medicare/ AARP  Physician Information:  Referring Practitioner: Desi Pittman of care signed (Y/N):     Date of Patient follow up with Physician:     G-Code (if applicable):      Date G-Code Applied:         Progress Note: [x]  Yes  []  No  Next due by: Visit #10       Latex Allergy:  [x]NO      []YES  Preferred Language for Healthcare:   [x]English       []other:    Visit # Insurance Allowable   8 Med Nec     Pain level:  7/10      SUBJECTIVE:  Feels improvement, states noticing less pain when walking long distances and ascending/ descending stairs.      OBJECTIVE: See eval  Observation:   Test measurements:  L knee AROM: 0 degrees-127 degrees 19    RESTRICTIONS/PRECAUTIONS: LATEX ALLERGY    Exercises/Interventions:     Therapeutic Ex Sets/sec Notes HEP   Belt stretch  Seated HSS 30\"x3  30\"x3     Heel prop  Heel slides 1'  x10     QS  SLR  SSLR x30  x30       Standing ABD/EXT  Step Ups  SLS  TKE x30 ea  10\"x10  x20 Blue     LAQ  HS curls 10# 3x 10  x30 25#     Bike  Slantboard  HRs 3'  30\"x3  3x10 Vitals monitored    Bridges 3x10                                                     Manual Intervention                                          NMR re-education                                                                Therapeutic Exercise and NMR EXR  [x] (18492) Provided verbal/tactile cueing for activities related to strengthening, flexibility, endurance, ROM for improvements in LE, proximal hip, and core control with self care, mobility, lifting, ambulation.  [] (88445) Provided verbal/tactile cueing for activities related to improving balance, coordination, kinesthetic sense, posture, motor skill, proprioception  to assist with LE, proximal hip, and core control in self care, mobility, lifting, ambulation and eccentric single leg control. NMR and Therapeutic Activities:    [x] (79177 or 48913) Provided verbal/tactile cueing for activities related to improving balance, coordination, kinesthetic sense, posture, motor skill, proprioception and motor activation to allow for proper function of core, proximal hip and LE with self care and ADLs  [] (96823) Gait Re-education- Provided training and instruction to the patient for proper LE, core and proximal hip recruitment and positioning and eccentric body weight control with ambulation re-education including up and down stairs     Home Exercise Program:    [x] (77973) Reviewed/Progressed HEP activities related to strengthening, flexibility, endurance, ROM of core, proximal hip and LE for functional self-care, mobility, lifting and ambulation/stair navigation   [] (93754)Reviewed/Progressed HEP activities related to improving balance, coordination, kinesthetic sense, posture, motor skill, proprioception of core, proximal hip and LE for self care, mobility, lifting, and ambulation/stair navigation      Manual Treatments:  PROM / STM / Oscillations-Mobs:  G-I, II, III, IV (PA's, Inf., Post.)  [x] (44544) Provided manual therapy to mobilize LE, proximal hip and/or LS spine soft tissue/joints for the purpose of modulating pain, promoting relaxation,  increasing ROM, reducing/eliminating soft tissue swelling/inflammation/restriction, improving soft tissue extensibility and allowing for proper ROM for normal function with self care, mobility, lifting and ambulation.      Modalities:  VASO 10 minutes for swelling/ effusion to L knee    Charges:  Timed Code Treatment Minutes: 40   Total Treatment Minutes: 50     [] ROBI LOW 32061  [x] VX(43776) x  2   [] IONTO  [x] NMR (37796) x      [x] VASO  [] Manual (68326) x       [] Other:  [] TA x       [] Mech Traction (82215)  [] ES(attended) (90022)      [] ES (un) (27967):     GOALS:   Patient stated goal: \"Return to normal\"    Therapist goals for Patient:   Short Term Goals: To be achieved in: 2 weeks  1. Independent in HEP and progression per patient tolerance, in order to prevent re-injury. 2. Patient will have a decrease in pain to facilitate improvement in movement, function, and ADLs as indicated by Functional Deficits. Long Term Goals: To be achieved in: 12 weeks  1. Disability index score of 30% or less for the LEFS to assist with reaching prior level of function. 2. Patient will demonstrate increased AROM to 0-120 to allow for proper joint functioning as indicated by patients Functional Deficits. 3. Patient will demonstrate an increase in Strength to good proximal hip strength and control, within 5lb HHD in LE to allow for proper functional mobility as indicated by patients Functional Deficits. 4. Patient will return to full functional activities without increased symptoms or restriction including ability to ambulate I without gait abnormality. 5. Patient will be able to perform bed mobility and stand from seated position pain free to return to ADLs. Progression Towards Functional goals:  [] Patient is progressing as expected towards functional goals listed. [x] Progression is slowed due to complexities listed. [x] Progression has been slowed due to co-morbidities. [] Plan just implemented, too soon to assess goals progression  [] Other:     ASSESSMENT:  HR, BP, and O2 stats taken following bike with values WNL. Vitals monitored throughout session. Poor stamina secondary to shortness of breath, did not attempt strenuous exercises that would exert patient breathing or increase HR.  Educated on frequent rest breaks and monitoring cardiac symptoms with

## 2019-05-15 ENCOUNTER — HOSPITAL ENCOUNTER (OUTPATIENT)
Dept: PHYSICAL THERAPY | Age: 64
Setting detail: THERAPIES SERIES
Discharge: HOME | End: 2019-05-15
Payer: MEDICARE

## 2019-05-15 PROCEDURE — 97110 THERAPEUTIC EXERCISES: CPT | Performed by: PHYSICAL THERAPIST

## 2019-05-15 PROCEDURE — 97016 VASOPNEUMATIC DEVICE THERAPY: CPT | Performed by: PHYSICAL THERAPIST

## 2019-05-15 PROCEDURE — 97112 NEUROMUSCULAR REEDUCATION: CPT | Performed by: PHYSICAL THERAPIST

## 2019-05-15 NOTE — FLOWSHEET NOTE
ambulation.  [] (86693) Provided verbal/tactile cueing for activities related to improving balance, coordination, kinesthetic sense, posture, motor skill, proprioception  to assist with LE, proximal hip, and core control in self care, mobility, lifting, ambulation and eccentric single leg control. NMR and Therapeutic Activities:    [x] (17385 or 32826) Provided verbal/tactile cueing for activities related to improving balance, coordination, kinesthetic sense, posture, motor skill, proprioception and motor activation to allow for proper function of core, proximal hip and LE with self care and ADLs  [] (99979) Gait Re-education- Provided training and instruction to the patient for proper LE, core and proximal hip recruitment and positioning and eccentric body weight control with ambulation re-education including up and down stairs     Home Exercise Program:    [x] (72528) Reviewed/Progressed HEP activities related to strengthening, flexibility, endurance, ROM of core, proximal hip and LE for functional self-care, mobility, lifting and ambulation/stair navigation   [] (68106)Reviewed/Progressed HEP activities related to improving balance, coordination, kinesthetic sense, posture, motor skill, proprioception of core, proximal hip and LE for self care, mobility, lifting, and ambulation/stair navigation      Manual Treatments:  PROM / STM / Oscillations-Mobs:  G-I, II, III, IV (PA's, Inf., Post.)  [x] (15664) Provided manual therapy to mobilize LE, proximal hip and/or LS spine soft tissue/joints for the purpose of modulating pain, promoting relaxation,  increasing ROM, reducing/eliminating soft tissue swelling/inflammation/restriction, improving soft tissue extensibility and allowing for proper ROM for normal function with self care, mobility, lifting and ambulation.      Modalities:  VASO 10 minutes for swelling/ effusion to L knee    Charges:  Timed Code Treatment Minutes: 40   Total Treatment Minutes: 50     [] ROBI LOW 07963  [x] MI(21379) x  2   [] IONTO  [x] NMR (28241) x      [x] VASO  [] Manual (57871) x       [] Other:  [] TA x       [] Mech Traction (35695)  [] ES(attended) (50917)      [] ES (un) (66993):     GOALS:   Patient stated goal: \"Return to normal\"    Therapist goals for Patient:   Short Term Goals: To be achieved in: 2 weeks  1. Independent in HEP and progression per patient tolerance, in order to prevent re-injury. 2. Patient will have a decrease in pain to facilitate improvement in movement, function, and ADLs as indicated by Functional Deficits. Long Term Goals: To be achieved in: 12 weeks  1. Disability index score of 30% or less for the LEFS to assist with reaching prior level of function. 2. Patient will demonstrate increased AROM to 0-120 to allow for proper joint functioning as indicated by patients Functional Deficits. 3. Patient will demonstrate an increase in Strength to good proximal hip strength and control, within 5lb HHD in LE to allow for proper functional mobility as indicated by patients Functional Deficits. 4. Patient will return to full functional activities without increased symptoms or restriction including ability to ambulate I without gait abnormality. 5. Patient will be able to perform bed mobility and stand from seated position pain free to return to ADLs. Progression Towards Functional goals:  [] Patient is progressing as expected towards functional goals listed. [x] Progression is slowed due to complexities listed. [x] Progression has been slowed due to co-morbidities. [] Plan just implemented, too soon to assess goals progression  [] Other:     ASSESSMENT:  HR, BP, and O2 stats taken following bike with values WNL. Vitals monitored throughout session. Poor stamina secondary to shortness of breath, did not attempt strenuous exercises that would exert patient breathing or increase HR.  Educated on frequent rest breaks and monitoring cardiac symptoms with activity. Very gradual increase reps and resistance.     Treatment/Activity Tolerance:  [] Patient tolerated treatment well [] Patient limited by fatique  [] Patient limited by pain  [x] Patient limited by other medical complications  [] Other:     Prognosis: [] Good [] Fair  [] Poor    Patient Requires Follow-up: [x] Yes  [] No    PLAN: See eval  [x] Continue per plan of care [] Alter current plan (see comments)  [] Plan of care initiated [] Hold pending MD visit [] Discharge    Electronically signed by: Abdulkadir Marinelli PT, DPT

## 2019-05-15 NOTE — FLOWSHEET NOTE
Physical Therapist Assistant Activity Sheet    Date:  5/15/2019    Patient Name:  Delia Arriaga    :  1955  MRN: [id number]  Restrictions/Precautions:    Medical/Treatment Diagnosis Information:  ·   Diagnosis: PT: L TKR revision (19)  ·   Treatment Diagnosis: L knee pain secondary to TKR revision M25.562/ M17.12  Physician Information:    Referring Practitioner: Costa [facility] Post-[facility]  8 wks  12 wks 16 wks 20 wks   24 wks                            Activities                                                DOS/DOI:                                                    Date: 5/15/19    Bike    Elliptical    Treadmill    Airdyne        Gastroc stretch, heel raises 30\" x 3, 3 x 10   Soleus stretch    Hamstring stretch    ITB stretch    Hip Flexor stretch    Quad stretch    Adductor stretch        Weight Shifting sp                              fp                              tp    Lateral walking (with/w/o TB)        Balance: PEP/[place] board                   SLS          Star excursion load/explode          Extremity reach UE/LE        Leg Press Jose Roberto. Ecc.                      Inv. Calf Press Jose Roberto. Ecc.                      Inv.        SABRINA   Flex               ABd               ADd               TKE               Ext        Steps  Up                Up and Over                Down                Lateral                Rotation        Squats:  Mini                   Wall                   BOSU        Lunges:  Lunge to Balance                   Balance to Lunge                   Walking        Knee Extension Bilat. 10# x 30                              Ecc.                               Inv. Hamstring Curls Bilat. 30# x 30                               Ecc.                                Inv.        Soleus Press Bilat. Ecc.                            Inv.         Ladders    Square    Jump/Hop  Low                       Med.

## 2019-05-17 ENCOUNTER — APPOINTMENT (OUTPATIENT)
Dept: PHYSICAL THERAPY | Age: 64
End: 2019-05-17
Payer: MEDICARE

## 2019-05-20 ENCOUNTER — HOSPITAL ENCOUNTER (OUTPATIENT)
Dept: PHYSICAL THERAPY | Age: 64
Setting detail: THERAPIES SERIES
Discharge: HOME | End: 2019-05-20
Payer: MEDICARE

## 2019-05-20 PROCEDURE — 97016 VASOPNEUMATIC DEVICE THERAPY: CPT

## 2019-05-20 PROCEDURE — 97112 NEUROMUSCULAR REEDUCATION: CPT

## 2019-05-20 PROCEDURE — 97110 THERAPEUTIC EXERCISES: CPT

## 2019-05-20 NOTE — PLAN OF CARE
[x]English       []other:    Visit # Insurance Allowable   10 Med Nec     Pain level:  5/10      SUBJECTIVE:  Pt states knee feels stiff upon arrival, sore. States frustrated with how quickly he fatigues. OBJECTIVE: See eval  Observation:   Test measurements:  L knee AROM: 0 degrees-128 degrees 5/20/19    RESTRICTIONS/PRECAUTIONS: LATEX ALLERGY    Exercises/Interventions:     Therapeutic Ex Sets/sec Notes HEP   Belt stretch  Seated HSS 30\"x3  30\"x3     Heel prop  Heel slides 1'  x10     QS  SLR  SSLR x30  1# x30       Standing ABD  Step Ups  SLS  TKE x30 ea  10\"x10   dizziness    LAQ  HS curls 10# x30  30# x30 SOB    Bike  Slantboard  HRs 3'  30\"x3  3x10 Vitals monitored    Bridges 3x10                                                     Manual Intervention                                          NMR re-education                                                                Therapeutic Exercise and NMR EXR  [x] (52454) Provided verbal/tactile cueing for activities related to strengthening, flexibility, endurance, ROM for improvements in LE, proximal hip, and core control with self care, mobility, lifting, ambulation.  [] (64495) Provided verbal/tactile cueing for activities related to improving balance, coordination, kinesthetic sense, posture, motor skill, proprioception  to assist with LE, proximal hip, and core control in self care, mobility, lifting, ambulation and eccentric single leg control.      NMR and Therapeutic Activities:    [x] (21685 or 90798) Provided verbal/tactile cueing for activities related to improving balance, coordination, kinesthetic sense, posture, motor skill, proprioception and motor activation to allow for proper function of core, proximal hip and LE with self care and ADLs  [] (99854) Gait Re-education- Provided training and instruction to the patient for proper LE, core and proximal hip recruitment and positioning and eccentric body weight control with ambulation re-education including up and down stairs     Home Exercise Program:    [x] (88502) Reviewed/Progressed HEP activities related to strengthening, flexibility, endurance, ROM of core, proximal hip and LE for functional self-care, mobility, lifting and ambulation/stair navigation   [] (23238)Reviewed/Progressed HEP activities related to improving balance, coordination, kinesthetic sense, posture, motor skill, proprioception of core, proximal hip and LE for self care, mobility, lifting, and ambulation/stair navigation      Manual Treatments:  PROM / STM / Oscillations-Mobs:  G-I, II, III, IV (PA's, Inf., Post.)  [x] (99780) Provided manual therapy to mobilize LE, proximal hip and/or LS spine soft tissue/joints for the purpose of modulating pain, promoting relaxation,  increasing ROM, reducing/eliminating soft tissue swelling/inflammation/restriction, improving soft tissue extensibility and allowing for proper ROM for normal function with self care, mobility, lifting and ambulation. Modalities:  VASO 10 minutes for swelling/ effusion to L knee    Charges:  Timed Code Treatment Minutes: 40   Total Treatment Minutes: 50     [] EVAL LOW 55048  [x] ID(80604) x  2   [] IONTO  [x] NMR (03858) x      [x] VASO  [] Manual (77928) x       [] Other:  [] TA x       [] Mech Traction (25432)  [] ES(attended) (24520)      [] ES (un) (20070):     GOALS:   Patient stated goal: \"Return to normal\"    Therapist goals for Patient:   Short Term Goals: To be achieved in: 2 weeks  1. Independent in HEP and progression per patient tolerance, in order to prevent re-injury. 2. Patient will have a decrease in pain to facilitate improvement in movement, function, and ADLs as indicated by Functional Deficits. Long Term Goals: To be achieved in: 12 weeks  1. Disability index score of 30% or less for the LEFS to assist with reaching prior level of function.    2. Patient will demonstrate increased AROM to 0-120 to allow for proper joint functioning as indicated by patients Functional Deficits. 3. Patient will demonstrate an increase in Strength to good proximal hip strength and control, within 5lb HHD in LE to allow for proper functional mobility as indicated by patients Functional Deficits. 4. Patient will return to full functional activities without increased symptoms or restriction including ability to ambulate I without gait abnormality. 5. Patient will be able to perform bed mobility and stand from seated position pain free to return to ADLs. Progression Towards Functional goals:  [] Patient is progressing as expected towards functional goals listed. [x] Progression is slowed due to complexities listed. [x] Progression has been slowed due to co-morbidities. [] Plan just implemented, too soon to assess goals progression  [] Other:     ASSESSMENT:  HR, BP, and O2 stats taken following bike with values WNL. Vitals monitored throughout session. Poor stamina secondary to shortness of breath, did not attempt strenuous exercises that would exert patient breathing or increase HR. Educated on frequent rest breaks and monitoring cardiac symptoms with activity. Very gradual increase reps and resistance.     Treatment/Activity Tolerance:  [] Patient tolerated treatment well [] Patient limited by fatique  [] Patient limited by pain  [x] Patient limited by other medical complications  [] Other:     Prognosis: [] Good [] Fair  [] Poor    Patient Requires Follow-up: [x] Yes  [] No    PLAN: See eval  [x] Continue per plan of care [] Alter current plan (see comments)  [] Plan of care initiated [] Hold pending MD visit [] Discharge    Electronically signed by: Chito Llanos PT

## 2019-05-21 NOTE — PROGRESS NOTES
Diagnosis Date    Anxiety     severe pre-op Anxiety    Arthritis     knee    Bladder distention     Patient sees Dr. Stephanie Stuart HTN (hypertension) 2/18/2019    Hyperlipidemia     Irregular heart beat     hx of     Knee joint dislocation     left    Urinary incontinence     wears depends    Urinary retention     self cath 3x day/ resolved 11/2018    Wears dentures     full        Past Surgical History:   Procedure Laterality Date    CHOLECYSTECTOMY      CYSTOSCOPY      KNEE ARTHROSCOPY Left 03/03/2017     LEFT KNEE ARTHROSCOPY, SYNOVECTOMY      PROSTATE SURGERY      REVISION TOTAL KNEE ARTHROPLASTY Left 2/18/2019    REVISION LEFT TOTAL KNEE ARTHROPLASTY               LOREDO performed by Lisa Koenig MD at [facility] Left 2017       Allergies   Allergen Reactions    Other Anaphylaxis    Percocet [Oxycodone-Acetaminophen] Shortness Of Breath     Felt like a heart attack  Felt like a heart attack      Morphine Other (See Comments)     Other reaction(s): Other (See Comments)  Hallucinations  Hallucinations  Lights me up    Morphine And Related Other (See Comments)    Nickel     Propoxyphene      Darvocet       Social History:  Reviewed. reports that he has been smoking cigarettes. He has a 15.00 pack-year smoking history. He has never used smokeless tobacco. He reports that he does not drink alcohol or use drugs. Family History:  Reviewed. family history includes Alcohol Abuse in his brother; Alzheimer's Disease in his maternal grandmother; Arthritis in his sister; Cancer in his maternal grandmother; Diabetes in his paternal grandmother; Heart Disease in his mother; Other in his sister and sister; Thyroid Disease in his father. Review of System:    · Constitutional: No fevers, chills. · Eyes: No visual changes or diplopia. No scleral icterus. · ENT: No Headaches. No mouth sores or sore throat.   · Cardiovascular: No for chest pain, No for dyspnea on exertion, No for palpitations or No for loss of consciousness. No cough, hemoptysis, No for pleuritic pain, or phlebitis. · Respiratory: No for cough or wheezing. No hematemesis. · Gastrointestinal: No abdominal pain, blood in stools. · Genitourinary: No dysuria, or hematuria. · Musculoskeletal: No gait disturbance,    · Integumentary: No rash or pruritis. · Neurological: No headache, change in muscle strength, numbness or tingling. · Psychiatric: No anxiety, or depression. · Endocrine: No temperature intolerance. No excessive thirst, fluid intake, or urination. · Hem/Lymph: No abnormal bruising or bleeding, blood clots or swollen lymph nodes. · Allergic/Immunologic: No nasal congestion or hives. Physical Examination:  Vitals:    05/22/19 1114   BP: 135/80   Pulse: 68         Wt Readings from Last 3 Encounters:   05/22/19 193 lb (87.5 kg)   04/18/19 190 lb (86.2 kg)   04/16/19 196 lb 9.6 oz (89.2 kg)       · Constitutional: Oriented. No distress. · Head: Normocephalic and atraumatic. · Mouth/Throat: Oropharynx is clear and moist.   · Eyes: Conjunctivae clear without jaunduice. PERRL. · Neck: Neck supple. No rigidity. No JVD present. · Cardiovascular: Normal rate, regular rhythm, S1&S2. · Pulmonary/Chest: Bilateral respiratory sounds. No wheezes, No rhonchi. · Abdominal: Soft. Bowel sounds present. No distension, No tenderness. · Musculoskeletal: No tenderness. No edema    · Lymphadenopathy: Has no cervical adenopathy. · Neurological: Alert and oriented. Cranial nerve appears intact, No Gross deficit   · Skin: Skin is warm and dry. No rash noted. · Psychiatric: Has a normal mood, affect and behavior     Labs:  Reviewed. No results for input(s): NA, K, CL, CO2, PHOS, BUN, CREATININE in the last 72 hours. Invalid input(s): CA,  TSH  No results for input(s): WBC, HGB, HCT, MCV, PLT in the last 72 hours.   Lab Results   Component Value Date    TROPONINI <0.01 03/11/2019     Lab Results   Component Value Date    BNP 26.0 03/19/2018    BNP 31 10/17/2012    BNP <15.0 10/16/2012     Lab Results   Component Value Date    PROTIME 11.7 04/18/2019    PROTIME 12.6 03/11/2019    PROTIME 11.3 02/01/2019    INR 1.03 04/18/2019    INR 1.11 03/11/2019    INR 0.99 02/01/2019     Lab Results   Component Value Date    CHOL 175 03/12/2019    HDL 29 03/12/2019    TRIG 131 03/12/2019       ECG: Personally reviewed: SB, HR 59, , QRS 98    ECHO: 3/27/19  Normal LV systolic function with EF of 55-60%.   No regional wall motion abnormalites are seen.   Grade I diastolic dysfunction with normal filling pressure.   Mild mitral regurgitation.   No TR jet to estimate systolic pulmonary artery pressure (SPAP). Probably   normal.    Stress Test: 3/21/19  No evidence of stress-induced reversible myocardial ischemia. Cardiac Angiography: 2012  RESULTS:  HEMODYNAMICS:  Left anterior and diastolic pressure equals 5. There is no significant gradient crossing the aortic valve by pullback post  cineangiography. LEFT VENTRICULOGRAM:  Left ventriculogram demonstrates a uniform wall motion. The estimated ejection fraction is 55%. LEFT MAIN:  Normal.   LEFT ANTERIOR DESCENDING:  LAD courses and wraps around the apex. There are  luminal irregularities in the proximal vessel but no significant obstructive  lesion. LEFT CIRCUMFLEX:  Circumflex consisted of a very small high-rising marginal  branch. A large second marginal with multiple terminal division then courses  on an AV groove. Circumflex is normal.   RIGHT CORONARY ARTERY:  Right coronary artery is a dominant vessel. He has a  moderate-sized PDA and several distal posterolateral branches. Right  coronary artery is normal.     IMPRESSION:  1. Normal left ventricular systolic function. 2.  No significant obstructive coronary artery disease. 3.  Successful Angio-Seal of right femoral arteriotomy site.           Problem List:   Patient Active Problem List    Diagnosis Date Noted    Dyspnea on exertion 03/20/2018     Priority: High    Encounter for electronic analysis of reveal event recorder 04/18/2019    Simple chronic bronchitis ([address] 04/05/2019    Restrictive lung disease 04/05/2019    Observed sleep apnea 03/22/2019    SOB (shortness of breath) 03/11/2019    HTN (hypertension) 03/11/2019    Knee pain, left 03/11/2019    History of revision of total knee arthroplasty 02/18/2019    HTN (hypertension) 02/18/2019    Suspected sleep apnea 09/19/2018    Current smoker 09/19/2018    GERD (gastroesophageal reflux disease) 09/19/2018    Class 1 obesity in adult 09/19/2018    Hepatic cyst 09/19/2018    Abdominal pain 08/20/2018    S/P transurethral resection of prostate 08/15/2018    Gross hematuria 08/15/2018    Complex regional pain syndrome type 2 of left lower extremity     Bilateral primary osteoarthritis of knee     Heart palpitations 03/19/2018    Status post total left knee replacement 08/30/2017    Reflex sympathetic dystrophy of both lower extremities 08/11/2017    Primary osteoarthritis of left knee 12/19/2016    Acquired varus deformity knee 10/24/2016    Effusion of knee joint, left 10/24/2016    Patellofemoral stress syndrome of left knee 10/24/2016    Gastritis 10/19/2012    Chest pain 10/19/2012    HLD (hyperlipidemia) 10/19/2012        Assessment:   1. Chest pain, unspecified type    2. Palpitations    3. SOB (shortness of breath)      Cardiac HX: Kala Liz is a 70 y. o.man with a h/o of anxiety, HTN, HLD, palpitations and SOB c/o for over one year. Work-up included a normal echo, MPI and pulmonology eval.  Event monitor (9/2018) showed SR/ST with rates ranging from 60-120bpm.  Sleep study (3/30/19) showed mild TITUS, recommending smoking cessation and weight loss. PFT's (3/27/19) were unrevealing.     Event monitor (9/14/2018-9/20/2018) showed NSR, SB with a heart rate between .    24-hour Holter 1 4/2/2019 showed a min HR 48, average 80, max 150, NSR, 4 beats  NSAT, no other arrhythmias noted. Palpitations  - S/p ILR  - Device check today showed no arrhythmias that correlate with symptomatic episodes  - Reviewed process with patient to download 4 s/s episodes then do a manual download in order to catch all   - F/u Dr. Walter Gimenez 3 months   - ECG ordered and results personally reviewed     Chest pain/SOB  - Lexiscan myoview - will start with GXT and then convert to Steffanie if unable to do    EF of 23-68%  No systolic HF  ASA and Statin for CAD  No known AF     Tobacco use was discussed with the patient and education provided. All questions and concerns were addressed to the patient/family. Alternatives to my treatment were discussed. The note was completed using EMR. Every effort was made to ensure accuracy; however, inadvertent computerized transcription errors may be present. Patient received education regarding their diagnosis, treatment and medications while in the office today.       Jami Howard [address]

## 2019-05-22 ENCOUNTER — OFFICE VISIT (OUTPATIENT)
Dept: CARDIOLOGY CLINIC | Age: 64
End: 2019-05-22
Payer: MEDICARE

## 2019-05-22 ENCOUNTER — PROCEDURE VISIT (OUTPATIENT)
Dept: CARDIOLOGY CLINIC | Age: 64
End: 2019-05-22
Payer: MEDICARE

## 2019-05-22 ENCOUNTER — HOSPITAL ENCOUNTER (OUTPATIENT)
Dept: PHYSICAL THERAPY | Age: 64
Setting detail: THERAPIES SERIES
Discharge: HOME | End: 2019-05-22
Payer: MEDICARE

## 2019-05-22 VITALS
WEIGHT: 193 LBS | SYSTOLIC BLOOD PRESSURE: 135 MMHG | BODY MASS INDEX: 30.23 KG/M2 | HEART RATE: 68 BPM | DIASTOLIC BLOOD PRESSURE: 80 MMHG

## 2019-05-22 DIAGNOSIS — Z45.09 ENCOUNTER FOR ELECTRONIC ANALYSIS OF REVEAL EVENT RECORDER: ICD-10-CM

## 2019-05-22 DIAGNOSIS — R00.2 HEART PALPITATIONS: ICD-10-CM

## 2019-05-22 DIAGNOSIS — R06.02 SOB (SHORTNESS OF BREATH): ICD-10-CM

## 2019-05-22 DIAGNOSIS — R00.2 PALPITATIONS: ICD-10-CM

## 2019-05-22 DIAGNOSIS — R07.9 CHEST PAIN, UNSPECIFIED TYPE: Primary | ICD-10-CM

## 2019-05-22 PROCEDURE — 4040F PNEUMOC VAC/ADMIN/RCVD: CPT | Performed by: NURSE PRACTITIONER

## 2019-05-22 PROCEDURE — 4004F PT TOBACCO SCREEN RCVD TLK: CPT | Performed by: NURSE PRACTITIONER

## 2019-05-22 PROCEDURE — G8417 CALC BMI ABV UP PARAM F/U: HCPCS | Performed by: NURSE PRACTITIONER

## 2019-05-22 PROCEDURE — 93291 INTERROG DEV EVAL SCRMS IP: CPT | Performed by: INTERNAL MEDICINE

## 2019-05-22 PROCEDURE — 97016 VASOPNEUMATIC DEVICE THERAPY: CPT

## 2019-05-22 PROCEDURE — 97112 NEUROMUSCULAR REEDUCATION: CPT

## 2019-05-22 PROCEDURE — G8427 DOCREV CUR MEDS BY ELIG CLIN: HCPCS | Performed by: NURSE PRACTITIONER

## 2019-05-22 PROCEDURE — 1123F ACP DISCUSS/DSCN MKR DOCD: CPT | Performed by: NURSE PRACTITIONER

## 2019-05-22 PROCEDURE — 93000 ELECTROCARDIOGRAM COMPLETE: CPT | Performed by: NURSE PRACTITIONER

## 2019-05-22 PROCEDURE — 3017F COLORECTAL CA SCREEN DOC REV: CPT | Performed by: NURSE PRACTITIONER

## 2019-05-22 PROCEDURE — 97110 THERAPEUTIC EXERCISES: CPT

## 2019-05-22 PROCEDURE — 99214 OFFICE O/P EST MOD 30 MIN: CPT | Performed by: NURSE PRACTITIONER

## 2019-05-22 NOTE — FLOWSHEET NOTE
[facility]     Physical Therapy Daily Treatment Note      Date:  2019    Patient Name:  Kike Darden  :  1955  MRN: [id number]  Restrictions/Precautions:    Medical/Treatment Diagnosis Information:  · Diagnosis: PT: L TKR revision (19)  · Treatment Diagnosis: L knee pain secondary to TKR revision M25.562/ G42.11  Insurance/Certification information:  PT Insurance Information: Medicare/ AARP  Physician Information:  Referring Practitioner: Ayden Mcdonald of care signed (Y/N):     Date of Patient follow up with Physician:     G-Code (if applicable):      Date G-Code Applied:       LEFS 70% disability    Progress Note: [x]  Yes  []  No  Next due by: Visit #10       Latex Allergy:  [x]NO      []YES  Preferred Language for Healthcare:   [x]English       []other:    Visit # Insurance Allowable   11 Med Nec     Pain level:  5/10      SUBJECTIVE:  Pt states knee has been noticeably stiffer the past few days and doesn't know why.       OBJECTIVE: See eval  Observation:   Test measurements:  L knee AROM: 0 degrees-128 degrees 19    RESTRICTIONS/PRECAUTIONS: LATEX ALLERGY    Exercises/Interventions:     Therapeutic Ex Sets/sec Notes HEP   Belt stretch  Seated HSS 30\"x3  30\"x3     Heel prop  Heel slides 1'  x10     QS  SLR  SSLR x30  1# x30       Standing ABD  Step Ups  SLS  TKE x30 ea  10\"x10   dizziness    LAQ  HS curls 15# x30  35# x30 SOB    Bike  Slantboard  HRs 3'  30\"x3  3x10 Vitals monitored    Bridges 3x10                                                     Manual Intervention                                          NMR re-education                                                                Therapeutic Exercise and NMR EXR  [x] (55093) Provided verbal/tactile cueing for activities related to strengthening, flexibility, endurance, ROM for improvements in LE, proximal hip, and core control with self care, mobility, lifting, ambulation.  [] (88985) Provided verbal/tactile cueing for activities related to improving balance, coordination, kinesthetic sense, posture, motor skill, proprioception  to assist with LE, proximal hip, and core control in self care, mobility, lifting, ambulation and eccentric single leg control. NMR and Therapeutic Activities:    [x] (58164 or 91579) Provided verbal/tactile cueing for activities related to improving balance, coordination, kinesthetic sense, posture, motor skill, proprioception and motor activation to allow for proper function of core, proximal hip and LE with self care and ADLs  [] (78072) Gait Re-education- Provided training and instruction to the patient for proper LE, core and proximal hip recruitment and positioning and eccentric body weight control with ambulation re-education including up and down stairs     Home Exercise Program:    [x] (27364) Reviewed/Progressed HEP activities related to strengthening, flexibility, endurance, ROM of core, proximal hip and LE for functional self-care, mobility, lifting and ambulation/stair navigation   [] (62665)Reviewed/Progressed HEP activities related to improving balance, coordination, kinesthetic sense, posture, motor skill, proprioception of core, proximal hip and LE for self care, mobility, lifting, and ambulation/stair navigation      Manual Treatments:  PROM / STM / Oscillations-Mobs:  G-I, II, III, IV (PA's, Inf., Post.)  [x] (46185) Provided manual therapy to mobilize LE, proximal hip and/or LS spine soft tissue/joints for the purpose of modulating pain, promoting relaxation,  increasing ROM, reducing/eliminating soft tissue swelling/inflammation/restriction, improving soft tissue extensibility and allowing for proper ROM for normal function with self care, mobility, lifting and ambulation.      Modalities:  VASO 10 minutes for swelling/ effusion to L knee    Charges:  Timed Code Treatment Minutes: 40   Total Treatment Minutes: 50     [] ROBI LOW 14768  [x] AD(66496) x  2   [] IONTO  [x] NMR (00538) x      [x] VASO  [] Manual (32477) x       [] Other:  [] TA x       [] Mech Traction (50345)  [] ES(attended) (06533)      [] ES (un) (56313):     GOALS:   Patient stated goal: \"Return to normal\"    Therapist goals for Patient:   Short Term Goals: To be achieved in: 2 weeks  1. Independent in HEP and progression per patient tolerance, in order to prevent re-injury. 2. Patient will have a decrease in pain to facilitate improvement in movement, function, and ADLs as indicated by Functional Deficits. Long Term Goals: To be achieved in: 12 weeks  1. Disability index score of 30% or less for the LEFS to assist with reaching prior level of function. 2. Patient will demonstrate increased AROM to 0-120 to allow for proper joint functioning as indicated by patients Functional Deficits. 3. Patient will demonstrate an increase in Strength to good proximal hip strength and control, within 5lb HHD in LE to allow for proper functional mobility as indicated by patients Functional Deficits. 4. Patient will return to full functional activities without increased symptoms or restriction including ability to ambulate I without gait abnormality. 5. Patient will be able to perform bed mobility and stand from seated position pain free to return to ADLs. Progression Towards Functional goals:  [] Patient is progressing as expected towards functional goals listed. [x] Progression is slowed due to complexities listed. [x] Progression has been slowed due to co-morbidities. [] Plan just implemented, too soon to assess goals progression  [] Other:     ASSESSMENT:  Poor stamina secondary to shortness of breath, did not attempt strenuous exercises that would exert patient breathing or increase HR. Educated on frequent rest breaks and monitoring cardiac symptoms with activity. Very gradual increase reps and resistance.     Treatment/Activity Tolerance:  [] Patient tolerated treatment well [] Patient limited by fatique  [] Patient limited by pain  [x] Patient limited by other medical complications  [] Other:     Prognosis: [] Good [] Fair  [] Poor    Patient Requires Follow-up: [x] Yes  [] No    PLAN: See eval  [x] Continue per plan of care [] Alter current plan (see comments)  [] Plan of care initiated [] Hold pending MD visit [] Discharge    Electronically signed by: Mica Corado PTA

## 2019-05-23 ENCOUNTER — TELEPHONE (OUTPATIENT)
Dept: CARDIOLOGY CLINIC | Age: 64
End: 2019-05-23

## 2019-05-24 ENCOUNTER — HOSPITAL ENCOUNTER (OUTPATIENT)
Dept: NON INVASIVE DIAGNOSTICS | Age: 64
Discharge: HOME | End: 2019-05-24
Payer: MEDICARE

## 2019-05-24 ENCOUNTER — APPOINTMENT (OUTPATIENT)
Dept: PHYSICAL THERAPY | Age: 64
End: 2019-05-24
Payer: MEDICARE

## 2019-05-24 LAB
LV EF: 72 %
LVEF MODALITY: NORMAL

## 2019-05-24 PROCEDURE — A9502 TC99M TETROFOSMIN: HCPCS | Performed by: NURSE PRACTITIONER

## 2019-05-24 PROCEDURE — 2580000003 HC RX 258: Performed by: NURSE PRACTITIONER

## 2019-05-24 PROCEDURE — 78452 HT MUSCLE IMAGE SPECT MULT: CPT

## 2019-05-24 PROCEDURE — 6360000002 HC RX W HCPCS: Performed by: INTERNAL MEDICINE

## 2019-05-24 PROCEDURE — 93017 CV STRESS TEST TRACING ONLY: CPT

## 2019-05-24 PROCEDURE — 3430000000 HC RX DIAGNOSTIC RADIOPHARMACEUTICAL: Performed by: NURSE PRACTITIONER

## 2019-05-24 RX ORDER — SODIUM CHLORIDE 0.9 % (FLUSH) 0.9 %
10 SYRINGE (ML) INJECTION 2 TIMES DAILY
Status: DISCONTINUED | OUTPATIENT
Start: 2019-05-24 | End: 2019-05-25 | Stop reason: HOSPADM

## 2019-05-24 RX ADMIN — TETROFOSMIN 10 MILLICURIE: 1.38 INJECTION, POWDER, LYOPHILIZED, FOR SOLUTION INTRAVENOUS at 08:21

## 2019-05-24 RX ADMIN — Medication 10 ML: at 08:21

## 2019-05-24 RX ADMIN — TETROFOSMIN 30 MILLICURIE: 1.38 INJECTION, POWDER, LYOPHILIZED, FOR SOLUTION INTRAVENOUS at 09:33

## 2019-05-24 RX ADMIN — Medication 10 ML: at 09:32

## 2019-05-24 RX ADMIN — REGADENOSON 0.4 MG: 0.08 INJECTION, SOLUTION INTRAVENOUS at 09:33

## 2019-05-30 ENCOUNTER — NURSE ONLY (OUTPATIENT)
Dept: CARDIOLOGY CLINIC | Age: 64
End: 2019-05-30

## 2019-05-30 ENCOUNTER — TELEPHONE (OUTPATIENT)
Dept: CARDIOLOGY CLINIC | Age: 64
End: 2019-05-30

## 2019-05-30 NOTE — TELEPHONE ENCOUNTER
Mr. Ector Aburto had a loop recorder implant about 6 weeks ago. He reports there is a red  pimple about the size of the end of an ink pen at the site. He forgot to ask about this last week when he saw you. It hasn't changed in appearance or size since last week, but he says the itching has gotten much worse. He said he feels like a mosquito bite. He had trouble after his knee replacement due an allergic reaction to nickel. This resulted in a blistered rash all over his leg after surgery. He wonders if there is any nickel in the loop recorder, or if he may be allergic to something else used during this procedure. He would also like to know what to do moving forward since his stress test results are normal.     Please call him.

## 2019-05-30 NOTE — PROGRESS NOTES
The pt is here for a wound check. The incision itself looks OK to me as it did last week. His biggest complaint is that the area itches. \"It feels like a mosquito bite\". He has an allergy to nickel, and wonders whether there is any in the device. I got the exact composition of the device from [employer], which are:    titanium - can,   titanium nitride - electrodes,   polyurethane silicone - header,   and the whole can has a parylene coating. The pt discussed this further with Forrest Duckworth NP today. We talked about the pt doing research to see whether any elements in the metals used may have a similar composition to nickel.

## 2019-05-31 ENCOUNTER — NURSE ONLY (OUTPATIENT)
Dept: CARDIOLOGY CLINIC | Age: 64
End: 2019-05-31

## 2019-05-31 ENCOUNTER — OFFICE VISIT (OUTPATIENT)
Dept: CARDIOLOGY CLINIC | Age: 64
End: 2019-05-31
Payer: MEDICARE

## 2019-05-31 ENCOUNTER — SURG/PROC ORDERS (OUTPATIENT)
Dept: CARDIOLOGY | Age: 64
End: 2019-05-31

## 2019-05-31 VITALS
HEART RATE: 84 BPM | DIASTOLIC BLOOD PRESSURE: 72 MMHG | WEIGHT: 193 LBS | BODY MASS INDEX: 30.23 KG/M2 | SYSTOLIC BLOOD PRESSURE: 110 MMHG | OXYGEN SATURATION: 97 %

## 2019-05-31 DIAGNOSIS — R07.9 CHEST PAIN, UNSPECIFIED TYPE: Primary | ICD-10-CM

## 2019-05-31 DIAGNOSIS — I20.9 ANGINA, CLASS III (HCC): ICD-10-CM

## 2019-05-31 DIAGNOSIS — R07.9 CHEST PAIN, UNSPECIFIED TYPE: ICD-10-CM

## 2019-05-31 DIAGNOSIS — R06.02 SOB (SHORTNESS OF BREATH): ICD-10-CM

## 2019-05-31 DIAGNOSIS — E78.00 HYPERCHOLESTEROLEMIA: Primary | ICD-10-CM

## 2019-05-31 PROCEDURE — 4040F PNEUMOC VAC/ADMIN/RCVD: CPT | Performed by: INTERNAL MEDICINE

## 2019-05-31 PROCEDURE — 3017F COLORECTAL CA SCREEN DOC REV: CPT | Performed by: INTERNAL MEDICINE

## 2019-05-31 PROCEDURE — G8427 DOCREV CUR MEDS BY ELIG CLIN: HCPCS | Performed by: INTERNAL MEDICINE

## 2019-05-31 PROCEDURE — 1123F ACP DISCUSS/DSCN MKR DOCD: CPT | Performed by: INTERNAL MEDICINE

## 2019-05-31 PROCEDURE — 4004F PT TOBACCO SCREEN RCVD TLK: CPT | Performed by: INTERNAL MEDICINE

## 2019-05-31 PROCEDURE — G8417 CALC BMI ABV UP PARAM F/U: HCPCS | Performed by: INTERNAL MEDICINE

## 2019-05-31 PROCEDURE — 99214 OFFICE O/P EST MOD 30 MIN: CPT | Performed by: INTERNAL MEDICINE

## 2019-05-31 PROCEDURE — G8598 ASA/ANTIPLAT THER USED: HCPCS | Performed by: INTERNAL MEDICINE

## 2019-05-31 RX ORDER — SODIUM CHLORIDE 9 MG/ML
INJECTION, SOLUTION INTRAVENOUS CONTINUOUS
Status: CANCELLED | OUTPATIENT
Start: 2019-05-31

## 2019-05-31 RX ORDER — SODIUM CHLORIDE 0.9 % (FLUSH) 0.9 %
10 SYRINGE (ML) INJECTION PRN
Status: CANCELLED | OUTPATIENT
Start: 2019-05-31

## 2019-05-31 RX ORDER — ATORVASTATIN CALCIUM 20 MG/1
20 TABLET, FILM COATED ORAL DAILY
Qty: 90 TABLET | Refills: 3 | Status: SHIPPED | OUTPATIENT
Start: 2019-05-31 | End: 2019-06-20 | Stop reason: SDUPTHER

## 2019-05-31 RX ORDER — SODIUM CHLORIDE 0.9 % (FLUSH) 0.9 %
10 SYRINGE (ML) INJECTION EVERY 12 HOURS SCHEDULED
Status: CANCELLED | OUTPATIENT
Start: 2019-05-31

## 2019-05-31 RX ORDER — ASPIRIN 325 MG
325 TABLET ORAL ONCE
Status: CANCELLED | OUTPATIENT
Start: 2019-06-20

## 2019-05-31 RX ORDER — ASPIRIN 81 MG/1
81 TABLET, CHEWABLE ORAL DAILY
Qty: 30 TABLET | Refills: 3 | COMMUNITY
Start: 2019-05-31

## 2019-05-31 NOTE — PROGRESS NOTES
70 y.o. man with anxiety, irreg heart beats, HTN, and HL. Here for cp. Gets cp on the left side of the chest. Felt like being hit in the chest with a hammer. Seems to be increasingly frequent. Gets it with stress, very infrequently random. Notices it more when exerting himself. Riding a bike consistently brings it on; has since stopped doing that. Had recent knee surgeyr in 2/2019 and did well. No previous MI. No previous stroke. Frequently dizzy. Does have episodes of syncope as well. Seeing EP team.    Has been having the pain for 1.5 years. Past Medical History:   Diagnosis Date    Anxiety     severe pre-op Anxiety    Arthritis     knee    Bladder distention     Patient sees Dr. Radha Mitchell HTN (hypertension) 2/18/2019    Hyperlipidemia     Irregular heart beat     hx of     Knee joint dislocation     left    Urinary incontinence     wears depends    Urinary retention     self cath 3x day/ resolved 11/2018    Wears dentures     full     Past Surgical History:   Procedure Laterality Date    CHOLECYSTECTOMY      CYSTOSCOPY      KNEE ARTHROSCOPY Left 03/03/2017     LEFT KNEE ARTHROSCOPY, SYNOVECTOMY      PROSTATE SURGERY      REVISION TOTAL KNEE ARTHROPLASTY Left 2/18/2019    REVISION LEFT TOTAL KNEE ARTHROPLASTY               LOREDO performed by Burgess Estefania MD at [address] [place] [address] 2017     Social History     Tobacco Use    Smoking status: Current Every Day Smoker     Packs/day: 0.50     Years: 30.00     Pack years: 15.00     Types: Cigarettes    Smokeless tobacco: Never Used   Substance Use Topics    Alcohol use: No    Drug use: No     Allergies   Allergen Reactions    Other Anaphylaxis    Percocet [Oxycodone-Acetaminophen] Shortness Of Breath     Felt like a heart attack  Felt like a heart attack      Morphine Other (See Comments)     Other reaction(s):  Other (See Comments)  Hallucinations  Hallucinations  Lights me up    Morphine And Related Other (See Comments)    Nickel     Propoxyphene      Darvocet     Family History   Problem Relation Age of Onset    Heart Disease Mother         defibrillator    Thyroid Disease Father         PPM    Arthritis Sister     Alcohol Abuse Brother         arthritis    Other Sister         Typhoid fever complications    Other Sister         obesity    Cancer Maternal Grandmother     Alzheimer's Disease Maternal Grandmother     Diabetes Paternal Grandmother      Review of Systems   General: No fevers, chills, fatigue, or night sweats. No abnormal changes in weight. HEENT: can have blurred vision and get dizzy with that. Cardiovascular: See HPI. No cramping in legs or buttocks when walking. Respiratory: No cough, hemoptysis, or wheezing. No history of asthma. Gastrointestinal: No abdominal pain, hematochezia, melana, or history of GI ulcers. Genito-Urinary: No dysuria or hematuria. No urgency or polyuria. Musculoskeletal: No complaints of joint pain, joint swelling or muscular weakness/soreness. Neurological: + dizzy. No history of a stroke or TIA. Psychological: + anxiety  Hematological and Lymphatic: No abnormal bleeding or bruising, blood clots, jaundice. Endocrine: No malaise/lethargy, palpitations, polydipsia/polyuria, temperature intolerance or unexpected weight changes. Skin: No rashes or non-healing ulcers. PE:  Blood pressure 110/72, pulse 84, weight 193 lb (87.5 kg), SpO2 97 %. General (appearance): Well devel. No distress  Eyes: Anicteric. eomi  Neck: Supple. No jvd  Ears/Nose/Mouth/Thorat: No cyanosis  CV: RRR. No m/r/g  Respiratory:  Clear B, normal respiratory effort  GI: Abd s/nt/nd. No peritoneal signs  Skin: Warm, dry. No rashes  Neuro/Psych: Alert and oriented x 3. Appropriate behavior  Ext:  No c/c. Non pitting edema  Pulses:  2+ carotid B. No bruits.  2+ radial    Lab Results   Component Value Date    WBC 6.5 03/12/2019    HGB 14.5 03/12/2019    HCT 41.7 03/12/2019    MCV 95.3 03/12/2019     03/12/2019     Lab Results   Component Value Date     04/18/2019    K 4.2 04/18/2019     04/18/2019    CO2 21 04/18/2019    BUN 12 04/18/2019    CREATININE 0.7 (L) 04/18/2019    GLUCOSE 130 (H) 04/18/2019    CALCIUM 9.2 04/18/2019    PROT 7.4 03/11/2019    LABALBU 4.5 03/11/2019    BILITOT 0.9 03/11/2019    ALKPHOS 85 03/11/2019    AST 17 03/11/2019    ALT 23 03/11/2019    LABGLOM >60 04/18/2019    GFRAA >60 04/18/2019    AGRATIO 1.6 03/11/2019    GLOB 2.9 03/11/2019     Lab Results   Component Value Date    CHOL 175 03/12/2019    CHOL 187 03/20/2018    CHOL 202 (H) 10/17/2012     Lab Results   Component Value Date    TRIG 131 03/12/2019    TRIG 178 (H) 03/20/2018    TRIG 151 (H) 10/17/2012     Lab Results   Component Value Date    HDL 29 (L) 03/12/2019    HDL 34 (L) 03/20/2018    HDL 49 01/26/2018     Lab Results   Component Value Date    LDLCALC 120 (H) 03/12/2019    LDLCALC 117 (H) 03/20/2018    LDLCALC 141 (H) 01/26/2018     Lab Results   Component Value Date    LABVLDL 26 03/12/2019    LABVLDL 36 03/20/2018    LABVLDL 27 01/26/2018     No results found for: CHOLHDLRATIO    5/2019 Nuc Stress:  Overall findings represent a low risk scan.    There is normal isotope uptake at stress and rest. There is no evidence of    myocardial ischemia or scar.    Normal LV size and systolic function.    Normal myocardial perfusion study.    Non-diagnostic EKG response due to failure to reach target heart rate.    ECG: Non-diagnostic EKG response due to failure to reach target heart rate. 3/2019 TTE:  Normal LV systolic function with EF of 55-60%.   No regional wall motion abnormalites are seen.   Grade I diastolic dysfunction with normal filling pressure.   Mild mitral regurgitation.   No TR jet to estimate systolic pulmonary artery pressure (SPAP).  Probably   normal.    2012 Cardiac cath: No sig CAD      Current Outpatient Medications:     ivabradine (CORLANOR) 5 MG TABS tablet, Take 1 tablet by mouth 2 times daily (with meals), Disp: 60 tablet, Rfl: 3    cyclobenzaprine (FLEXERIL) 10 MG tablet, Take 10 mg by mouth 3 times daily, Disp: , Rfl:     metoprolol tartrate (LOPRESSOR) 25 MG tablet, Take 1 tablet by mouth 3 times daily, Disp: 90 tablet, Rfl: 5    traMADol (ULTRAM) 50 MG tablet, Take 50 mg by mouth every 6 hours as needed for Pain., Disp: , Rfl:     omeprazole (PRILOSEC) 40 MG delayed release capsule, Take 40 mg by mouth daily , Disp: , Rfl:     A/P:  70 y.o. here for CP. 1. Hypercholesterolemia    2. Angina, class III ([facility] [address]      Recs:  -Will pursue cath. Given contd pain, and with exertion, all in agreement. R/B/A discussed.  -Ni allergy. Need to see what our stents have.  May just do diagnostic cath  -Going on cruise in August. Will set up prior.  -Stop smoking

## 2019-05-31 NOTE — PROGRESS NOTES
Left Heart Catheterization    A left heart catheterization is a procedure that provides your cardiologist with detailed information regarding how your heart functions. A small catheter (long, fine tube) is inserted into an artery (a vessel that carries blood and oxygen) that leads to your heart. While watching with x-ray equipment, small amounts of dye are injected which enables visualization of the heart arteries and chambers. The pictures that your cardiologist receives from the cardiac catheterization enable him or her to decide on the best treatment for you. Date of the procedure:  6/20/19    Time of arrival: 0730    Cardiologist performing the procedure: __Rapp_______________________    Instructions for your left heart catheterization:    1. Bring a list of your medications to the hospital.    2.  Please notify us before the procedure if you are allergic to anything; especially x-ray contrast dye, iodine, nickel, or any type of jewelry. This is very important! 3. Do not eat or drink anything at all after midnight (or 8 hours) prior to the procedure. 4.  Take all morning medications EXCEPT any diuretics (water pills) the day of the procedure with a small sip of water. 5.  If you are on Coumadin, Warfarin, or Ivett Lesch, please notify us so that we can make adjustments to your medication. 6.  If you are taking Xarelto, Eliquis, or Pradaxa, please stop staking these medications two days prior to the procedure (including the day of the procedure). 7.  If you are diabetic, check your blood sugar in the morning. If your blood sugar is 120 or less, do not take insulin. If your blood sugar is more than 120, take half the dose of your normal insulin. Do not take Metformin the night before your procedure or morning of the procedure. 8.  You MUST have someone to drive you home--no driving for 24 hours after your procedure.   If an intervention is performed, you might stay overnight in the hospital.    9.  Discharge instructions will be given to you at the time of your procedure. 10.  For any questions or if you cannot keep this appointment for any reason, please call [phone]. Spoke with pt regarding procedure. Pre-op instructions, time and location of arrival discussed. Pt verbalized understanding and all questions answered at this time. Spoke with Shantelle Gerardo in cath lab.

## 2019-06-05 ENCOUNTER — HOSPITAL ENCOUNTER (OUTPATIENT)
Dept: PHYSICAL THERAPY | Age: 64
Setting detail: THERAPIES SERIES
Discharge: HOME | End: 2019-06-05
Payer: MEDICARE

## 2019-06-05 PROCEDURE — 97016 VASOPNEUMATIC DEVICE THERAPY: CPT

## 2019-06-05 PROCEDURE — 97110 THERAPEUTIC EXERCISES: CPT

## 2019-06-05 PROCEDURE — 97112 NEUROMUSCULAR REEDUCATION: CPT

## 2019-06-05 NOTE — FLOWSHEET NOTE
[address] [facility]     Physical Therapy Daily Treatment Note      Date:  2019    Patient Name:  Cece Garcia  :  1955  MRN: [id number]  Restrictions/Precautions:    Medical/Treatment Diagnosis Information:  · Diagnosis: PT: L TKR revision (19)  · Treatment Diagnosis: L knee pain secondary to TKR revision M25.562/ N84.85  Insurance/Certification information:  PT Insurance Information: Medicare/ AARP  Physician Information:  Referring Practitioner: Marylou Severs of care signed (Y/N):     Date of Patient follow up with Physician:     G-Code (if applicable):      Date G-Code Applied:       LEFS 70% disability    Progress Note: [x]  Yes  []  No  Next due by: Visit #10       Latex Allergy:  [x]NO      []YES  Preferred Language for Healthcare:   [x]English       []other:    Visit # Insurance Allowable   12 Med Nec     Pain level:  3/10       SUBJECTIVE:  Pt states the doctors don't know what's going on with his heart, had chemical stress test last Friday. Possible pacemaker and/or stent in the future.        OBJECTIVE: See eval  Observation:   Test measurements:  L knee AROM: 0 degrees-128 degrees 19    RESTRICTIONS/PRECAUTIONS: LATEX ALLERGY    Exercises/Interventions:      Therapeutic Ex Sets/sec Notes HEP   Belt stretch  Seated HSS 30\"x3  30\"x3     Heel prop  Heel slides 1'  x10     QS  SLR   SSLR x30  1# x30       Standing ABD/EXT  Step Ups  SLS  TKE x30 ea  10\"x10   dizziness    LAQ  HS curls 15# x30  40# x30 SOB    Bike  Slantboard  HRs 3'  30\"x3  3x10 Vitals monitored    Bridges 3x10                                                     Manual Intervention                                          NMR re-education                                                                Therapeutic Exercise and NMR EXR  [x] (12151) Provided verbal/tactile cueing for activities related to strengthening, flexibility, endurance, ROM for improvements in LE, proximal hip, and core control with self care, mobility, lifting, ambulation.  [] (56736) Provided verbal/tactile cueing for activities related to improving balance, coordination, kinesthetic sense, posture, motor skill, proprioception  to assist with LE, proximal hip, and core control in self care, mobility, lifting, ambulation and eccentric single leg control. NMR and Therapeutic Activities:    [x] (88727 or 44709) Provided verbal/tactile cueing for activities related to improving balance, coordination, kinesthetic sense, posture, motor skill, proprioception and motor activation to allow for proper function of core, proximal hip and LE with self care and ADLs  [] (13201) Gait Re-education- Provided training and instruction to the patient for proper LE, core and proximal hip recruitment and positioning and eccentric body weight control with ambulation re-education including up and down stairs     Home Exercise Program:    [x] (44741) Reviewed/Progressed HEP activities related to strengthening, flexibility, endurance, ROM of core, proximal hip and LE for functional self-care, mobility, lifting and ambulation/stair navigation   [] (01991)Reviewed/Progressed HEP activities related to improving balance, coordination, kinesthetic sense, posture, motor skill, proprioception of core, proximal hip and LE for self care, mobility, lifting, and ambulation/stair navigation      Manual Treatments:  PROM / STM / Oscillations-Mobs:  G-I, II, III, IV (PA's, Inf., Post.)  [x] (02491) Provided manual therapy to mobilize LE, proximal hip and/or LS spine soft tissue/joints for the purpose of modulating pain, promoting relaxation,  increasing ROM, reducing/eliminating soft tissue swelling/inflammation/restriction, improving soft tissue extensibility and allowing for proper ROM for normal function with self care, mobility, lifting and ambulation.      Modalities:  VASO 10 minutes for swelling/ effusion to L knee    Charges:  Timed Code Treatment Minutes: 40   Total Treatment Minutes: 50     [] EVAL LOW 99588  [x] YZ(96103) x  2   [] IONTO  [x] NMR (70738) x      [x] VASO  [] Manual (81234) x       [] Other:  [] TA x       [] Mech Traction (87420)  [] ES(attended) (56641)      [] ES (un) (85915):     GOALS:   Patient stated goal: \"Return to normal\"    Therapist goals for Patient:   Short Term Goals: To be achieved in: 2 weeks  1. Independent in HEP and progression per patient tolerance, in order to prevent re-injury. 2. Patient will have a decrease in pain to facilitate improvement in movement, function, and ADLs as indicated by Functional Deficits. Long Term Goals: To be achieved in: 12 weeks  1. Disability index score of 30% or less for the LEFS to assist with reaching prior level of function. 2. Patient will demonstrate increased AROM to 0-120 to allow for proper joint functioning as indicated by patients Functional Deficits. 3. Patient will demonstrate an increase in Strength to good proximal hip strength and control, within 5lb HHD in LE to allow for proper functional mobility as indicated by patients Functional Deficits. 4. Patient will return to full functional activities without increased symptoms or restriction including ability to ambulate I without gait abnormality. 5. Patient will be able to perform bed mobility and stand from seated position pain free to return to ADLs. Progression Towards Functional goals:  [] Patient is progressing as expected towards functional goals listed. [x] Progression is slowed due to complexities listed. [x] Progression has been slowed due to co-morbidities. [] Plan just implemented, too soon to assess goals progression  [] Other:     ASSESSMENT:  Poor stamina secondary to shortness of breath, did not attempt strenuous exercises that would exert patient breathing or increase HR. Educated on frequent rest breaks and monitoring cardiac symptoms with activity. Very gradual increase reps and resistance.     Treatment/Activity Tolerance:  [] Patient tolerated treatment well [] Patient limited by fatique  [] Patient limited by pain  [x] Patient limited by other medical complications  [] Other:     Prognosis: [] Good [] Fair  [] Poor    Patient Requires Follow-up: [x] Yes  [] No    PLAN: See eval  [x] Continue per plan of care [] Alter current plan (see comments)  [] Plan of care initiated [] Hold pending MD visit [] Discharge    Electronically signed by: Matheus Suresh PTA

## 2019-06-07 ENCOUNTER — HOSPITAL ENCOUNTER (OUTPATIENT)
Dept: PHYSICAL THERAPY | Age: 64
Setting detail: THERAPIES SERIES
Discharge: HOME | End: 2019-06-07
Payer: MEDICARE

## 2019-06-07 PROCEDURE — 97112 NEUROMUSCULAR REEDUCATION: CPT

## 2019-06-07 PROCEDURE — 97110 THERAPEUTIC EXERCISES: CPT

## 2019-06-07 PROCEDURE — 97016 VASOPNEUMATIC DEVICE THERAPY: CPT

## 2019-06-07 NOTE — FLOWSHEET NOTE
[facility]     Physical Therapy Daily Treatment Note      Date:  2019    Patient Name:  Blue Youngblood  :  1955  MRN: [id number]  Restrictions/Precautions:    Medical/Treatment Diagnosis Information:  · Diagnosis: PT: L TKR revision (19)  · Treatment Diagnosis: L knee pain secondary to TKR revision M25.562/ S97.96  Insurance/Certification information:  PT Insurance Information: Medicare/ AARP  Physician Information:  Referring Practitioner: Lesly Rangel of care signed (Y/N):     Date of Patient follow up with Physician:     G-Code (if applicable):      Date G-Code Applied:       LEFS 70% disability    Progress Note: [x]  Yes  []  No  Next due by: Visit #10       Latex Allergy:  [x]NO      []YES  Preferred Language for Healthcare:   [x]English       []other:    Visit # Insurance Allowable   13 Med Nec     Pain level:  3/10       SUBJECTIVE:  Pt reports feeling pretty good this morning, mild soreness in knee.  Planning on finishing scheduled PT visits and d/c to possible cardiac rehab     OBJECTIVE: See eval  Observation:   Test measurements:  L knee AROM: 0 degrees-128 degrees 19    RESTRICTIONS/PRECAUTIONS: LATEX ALLERGY    Exercises/Interventions:      Therapeutic Ex Sets/sec Notes HEP   Belt stretch  Seated HSS 30\"x3  30\"x3     Heel prop  Heel slides 1'  x10     QS  SLR   SSLR x30  1# x30       Standing ABD/EXT  Step Ups  SLS  TKE x30 ea6\"-8\" x 10 ea  10\"x10       Blue foam    LAQ  HS curls 15# x30  40# x30 SOB    Leg Press 80# x 30     Bike  Slantboard  HRs 3'  30\"x3  3x10 Vitals monitored    Bridges 3x10                                                     Manual Intervention                                          NMR re-education                                                                Therapeutic Exercise and NMR EXR  [x] (41966) Provided verbal/tactile cueing for activities related to strengthening, flexibility, Patient tolerated treatment well [] Patient limited by fatique  [] Patient limited by pain  [x] Patient limited by other medical complications  [] Other:     Prognosis: [] Good [] Fair  [] Poor    Patient Requires Follow-up: [x] Yes  [] No    PLAN: See eval  [x] Continue per plan of care [] Alter current plan (see comments)  [] Plan of care initiated [] Hold pending MD visit [] Discharge    Electronically signed by: Lynda Gleason PTA

## 2019-06-11 DIAGNOSIS — R07.9 CHEST PAIN, UNSPECIFIED TYPE: ICD-10-CM

## 2019-06-11 LAB
A/G RATIO: 1.6 (ref 1.1–2.2)
ALBUMIN SERPL-MCNC: 4.5 G/DL (ref 3.4–5)
ALP BLD-CCNC: 61 U/L (ref 40–129)
ALT SERPL-CCNC: 30 U/L (ref 10–40)
ANION GAP SERPL CALCULATED.3IONS-SCNC: 14 MMOL/L (ref 3–16)
AST SERPL-CCNC: 21 U/L (ref 15–37)
BILIRUB SERPL-MCNC: 0.7 MG/DL (ref 0–1)
BUN BLDV-MCNC: 10 MG/DL (ref 7–20)
CALCIUM SERPL-MCNC: 9.7 MG/DL (ref 8.3–10.6)
CHLORIDE BLD-SCNC: 104 MMOL/L (ref 99–110)
CO2: 23 MMOL/L (ref 21–32)
CREAT SERPL-MCNC: 0.8 MG/DL (ref 0.8–1.3)
GFR AFRICAN AMERICAN: >60
GFR NON-AFRICAN AMERICAN: >60
GLOBULIN: 2.8 G/DL
GLUCOSE BLD-MCNC: 108 MG/DL (ref 70–99)
HCT VFR BLD CALC: 49.5 % (ref 40.5–52.5)
HEMOGLOBIN: 16.5 G/DL (ref 13.5–17.5)
INR BLD: 1.02 (ref 0.86–1.14)
MCH RBC QN AUTO: 31.9 PG (ref 26–34)
MCHC RBC AUTO-ENTMCNC: 33.4 G/DL (ref 31–36)
MCV RBC AUTO: 95.5 FL (ref 80–100)
PDW BLD-RTO: 13.4 % (ref 12.4–15.4)
PLATELET # BLD: 266 K/UL (ref 135–450)
PMV BLD AUTO: 9.3 FL (ref 5–10.5)
POTASSIUM SERPL-SCNC: 4.6 MMOL/L (ref 3.5–5.1)
PROTHROMBIN TIME: 11.6 SEC (ref 9.8–13)
RBC # BLD: 5.18 M/UL (ref 4.2–5.9)
SODIUM BLD-SCNC: 141 MMOL/L (ref 136–145)
TOTAL PROTEIN: 7.3 G/DL (ref 6.4–8.2)
WBC # BLD: 8.7 K/UL (ref 4–11)

## 2019-06-12 ENCOUNTER — HOSPITAL ENCOUNTER (OUTPATIENT)
Dept: PHYSICAL THERAPY | Age: 64
Setting detail: THERAPIES SERIES
Discharge: HOME | End: 2019-06-12
Payer: MEDICARE

## 2019-06-12 PROCEDURE — 97112 NEUROMUSCULAR REEDUCATION: CPT

## 2019-06-12 PROCEDURE — 97110 THERAPEUTIC EXERCISES: CPT

## 2019-06-12 PROCEDURE — 97016 VASOPNEUMATIC DEVICE THERAPY: CPT

## 2019-06-12 NOTE — FLOWSHEET NOTE
[facility]     Physical Therapy Daily Treatment Note      Date:  2019    Patient Name:  Enzo Chavez  :  1955  MRN: [id number]  Restrictions/Precautions:    Medical/Treatment Diagnosis Information:  · Diagnosis: PT: L TKR revision (19)  · Treatment Diagnosis: L knee pain secondary to TKR revision M25.562/ Y63.76  Insurance/Certification information:  PT Insurance Information: Medicare/ AARP  Physician Information:  Referring Practitioner: Emmanuel Frazier of care signed (Y/N):     Date of Patient follow up with Physician:     G-Code (if applicable):      Date G-Code Applied:       LEFS 70% disability    Progress Note: [x]  Yes  []  No  Next due by: Visit #10       Latex Allergy:  [x]NO      []YES  Preferred Language for Healthcare:   [x]English       []other:    Visit # Insurance Allowable   14 Med Nec     Pain level:  3/10       SUBJECTIVE:  Feeling pretty good, just sore. Plan to d/c next visit.       OBJECTIVE: See eval  Observation:   Test measurements:  L knee AROM: 0 degrees-128 degrees 19    RESTRICTIONS/PRECAUTIONS: LATEX ALLERGY    Exercises/Interventions:      Therapeutic Ex Sets/sec Notes HEP   Belt stretch  Seated HSS 30\"x3  30\"x3     Heel prop  Heel slides 1'  x10     QS  SLR   SSLR x30  1# x30       Standing ABD/EXT  Step Ups  SLS  TKE x30 ea8\" x 15  10\"x10       Blue foam    LAQ  HS curls 15# x30  45# x30 SOB    Leg Press 80# x 30     Bike  Slantboard  HRs 5'  30\"x3  3x10 Vitals monitored    Bridges 3x10                                                     Manual Intervention                                          NMR re-education                                                                Therapeutic Exercise and NMR EXR  [x] (46773) Provided verbal/tactile cueing for activities related to strengthening, flexibility, endurance, ROM for improvements in LE, proximal hip, and core control with self care, mobility, lifting, ambulation.  [] (92709) Provided verbal/tactile cueing for activities related to improving balance, coordination, kinesthetic sense, posture, motor skill, proprioception  to assist with LE, proximal hip, and core control in self care, mobility, lifting, ambulation and eccentric single leg control. NMR and Therapeutic Activities:    [x] (29141 or 29089) Provided verbal/tactile cueing for activities related to improving balance, coordination, kinesthetic sense, posture, motor skill, proprioception and motor activation to allow for proper function of core, proximal hip and LE with self care and ADLs  [] (89683) Gait Re-education- Provided training and instruction to the patient for proper LE, core and proximal hip recruitment and positioning and eccentric body weight control with ambulation re-education including up and down stairs     Home Exercise Program:    [x] (77236) Reviewed/Progressed HEP activities related to strengthening, flexibility, endurance, ROM of core, proximal hip and LE for functional self-care, mobility, lifting and ambulation/stair navigation   [] (57842)Reviewed/Progressed HEP activities related to improving balance, coordination, kinesthetic sense, posture, motor skill, proprioception of core, proximal hip and LE for self care, mobility, lifting, and ambulation/stair navigation      Manual Treatments:  PROM / STM / Oscillations-Mobs:  G-I, II, III, IV (PA's, Inf., Post.)  [x] (14002) Provided manual therapy to mobilize LE, proximal hip and/or LS spine soft tissue/joints for the purpose of modulating pain, promoting relaxation,  increasing ROM, reducing/eliminating soft tissue swelling/inflammation/restriction, improving soft tissue extensibility and allowing for proper ROM for normal function with self care, mobility, lifting and ambulation.      Modalities:  VASO 10 minutes for swelling/ effusion to L knee    Charges:  Timed Code Treatment Minutes: 40   Total Treatment Minutes: 50     [] EVAL LOW 00355  [x] VL(62103) x  2   [] IONTO  [x] NMR (91565) x      [x] VASO  [] Manual (70154) x       [] Other:  [] TA x       [] Mech Traction (67703)  [] ES(attended) (72301)      [] ES (un) (13374):     GOALS:   Patient stated goal: \"Return to normal\"    Therapist goals for Patient:   Short Term Goals: To be achieved in: 2 weeks  1. Independent in HEP and progression per patient tolerance, in order to prevent re-injury. 2. Patient will have a decrease in pain to facilitate improvement in movement, function, and ADLs as indicated by Functional Deficits. Long Term Goals: To be achieved in: 12 weeks  1. Disability index score of 30% or less for the LEFS to assist with reaching prior level of function. 2. Patient will demonstrate increased AROM to 0-120 to allow for proper joint functioning as indicated by patients Functional Deficits. 3. Patient will demonstrate an increase in Strength to good proximal hip strength and control, within 5lb HHD in LE to allow for proper functional mobility as indicated by patients Functional Deficits. 4. Patient will return to full functional activities without increased symptoms or restriction including ability to ambulate I without gait abnormality. 5. Patient will be able to perform bed mobility and stand from seated position pain free to return to ADLs. Progression Towards Functional goals:  [] Patient is progressing as expected towards functional goals listed. [x] Progression is slowed due to complexities listed. [x] Progression has been slowed due to co-morbidities. [] Plan just implemented, too soon to assess goals progression  [] Other:     ASSESSMENT:  Demonstrates increased endurance and progressing with strength with respect to cardiac symptoms. No complaints of pain, SOB monitored through out. Plan to d/c next visit.      Treatment/Activity Tolerance:  [x] Patient tolerated treatment well [] Patient limited by rhina  [] Patient limited by pain  [x] Patient limited by other medical complications  [] Other:     Prognosis: [] Good [] Fair  [] Poor    Patient Requires Follow-up: [x] Yes  [] No    PLAN: See eval  [x] Continue per plan of care [] Alter current plan (see comments)  [] Plan of care initiated [] Hold pending MD visit [] Discharge    Electronically signed by: Vasile Mccullough PTA

## 2019-06-14 ENCOUNTER — HOSPITAL ENCOUNTER (OUTPATIENT)
Dept: PHYSICAL THERAPY | Age: 64
Setting detail: THERAPIES SERIES
Discharge: HOME | End: 2019-06-14
Payer: MEDICARE

## 2019-06-14 PROCEDURE — 97110 THERAPEUTIC EXERCISES: CPT | Performed by: PHYSICAL THERAPIST

## 2019-06-14 PROCEDURE — 97016 VASOPNEUMATIC DEVICE THERAPY: CPT | Performed by: PHYSICAL THERAPIST

## 2019-06-14 PROCEDURE — 97112 NEUROMUSCULAR REEDUCATION: CPT | Performed by: PHYSICAL THERAPIST

## 2019-06-14 NOTE — FLOWSHEET NOTE
Macario Moon, [facility]     Physical Therapy Re-Certification Plan of Care    Dear  Robert Gomez,    We had the pleasure of treating the following patient for physical therapy services at [address] [place] [address]. A summary of our findings can be found in the updated assessment below. This includes our plan of care. If you have any questions or concerns regarding these findings, please do not hesitate to contact me at the office phone number checked above. Thank you for the referral.     Physician Signature:________________________________Date:__________________  By signing above (or electronic signature), therapists plan is approved by physician      Overall Response to Treatment:   [x]Patient is responding well to treatment and improvement is noted with regards  to goals   []Patient should continue to improve in reasonable time if they continue HEP   []Patient has plateaued and is no longer responding to skilled PT intervention    []Patient is getting worse and would benefit from return to referring MD   []Patient unable to adhere to initial POC   []Other: Patient at this time has reached max potential of orthopedic knee rehab, to begin cardiac rehab next week for shortness of breath.     Date:  2019    Patient Name:  Katharine Valadez  :  1955  MRN: [id number]  Restrictions/Precautions:    Medical/Treatment Diagnosis Information:  · Diagnosis: PT: L TKR revision (19)  · Treatment Diagnosis: L knee pain secondary to TKR revision M25.562/ K99.16  Insurance/Certification information:  PT Insurance Information: Medicare/ AARP  Physician Information:  Referring Practitioner: Aubrey Kay of care signed (Y/N):     Date of Patient follow up with Physician:     G-Code (if applicable):      Date G-Code Applied:       LEFS 70% disability    Progress Note: [x]  Yes  []  No  Next due by: Visit #10       Latex Allergy:  [x]NO      []YES  Preferred Language for Healthcare:   [x]English       []other:    Visit # Insurance Allowable   15 Med Nec     Pain level:  3/10       SUBJECTIVE:  Knee feels great, plans to DC as patient is beginning cardiac rehab for shortness of breath. OBJECTIVE: See eval  Observation:   Test measurements:  L knee AROM: 0 degrees-128 degrees 6/14/19      MMT: Hip flex: 5/5, Knee Flex/ Ext: 4+/5    RESTRICTIONS/PRECAUTIONS: LATEX ALLERGY    Exercises/Interventions:      Therapeutic Ex Sets/sec Notes HEP   Belt stretch  Seated HSS 30\"x3  30\"x3     Heel prop  Heel slides 1'  x10     QS  SLR   SSLR x30  1# x30       Standing ABD/EXT  Step Ups  SLS  TKE x30 ea8\" x 15  10\"x10       Blue foam    LAQ  HS curls 15# x30  45# x30 SOB    Leg Press 80# x 30     Bike  Slantboard  HRs 5'  30\"x3  3x10 Vitals monitored    Bridges 3x10                                                     Manual Intervention                                          NMR re-education                                                                Therapeutic Exercise and NMR EXR  [x] (26541) Provided verbal/tactile cueing for activities related to strengthening, flexibility, endurance, ROM for improvements in LE, proximal hip, and core control with self care, mobility, lifting, ambulation.  [] (69144) Provided verbal/tactile cueing for activities related to improving balance, coordination, kinesthetic sense, posture, motor skill, proprioception  to assist with LE, proximal hip, and core control in self care, mobility, lifting, ambulation and eccentric single leg control.      NMR and Therapeutic Activities:    [x] (15323 or 00800) Provided verbal/tactile cueing for activities related to improving balance, coordination, kinesthetic sense, posture, motor skill, proprioception and motor activation to allow for proper function of core, proximal hip and LE with self care and ADLs  [] (08799) Gait Re-education- Provided training and instruction to the patient for proper LE, core and proximal hip recruitment and positioning and eccentric body weight control with ambulation re-education including up and down stairs     Home Exercise Program:    [x] (26088) Reviewed/Progressed HEP activities related to strengthening, flexibility, endurance, ROM of core, proximal hip and LE for functional self-care, mobility, lifting and ambulation/stair navigation   [] (89875)Reviewed/Progressed HEP activities related to improving balance, coordination, kinesthetic sense, posture, motor skill, proprioception of core, proximal hip and LE for self care, mobility, lifting, and ambulation/stair navigation      Manual Treatments:  PROM / STM / Oscillations-Mobs:  G-I, II, III, IV (PA's, Inf., Post.)  [x] (73998) Provided manual therapy to mobilize LE, proximal hip and/or LS spine soft tissue/joints for the purpose of modulating pain, promoting relaxation,  increasing ROM, reducing/eliminating soft tissue swelling/inflammation/restriction, improving soft tissue extensibility and allowing for proper ROM for normal function with self care, mobility, lifting and ambulation. Modalities:  V    Charges:  Timed Code Treatment Minutes: 40   Total Treatment Minutes: 40     [] EVAL LOW 03473  [x] ZH(14043) x  2   [] IONTO  [x] NMR (43112) x      [] VASO  [] Manual (49144) x       [] Other:  [] TA x       [] Mech Traction (80488)  [] ES(attended) (11917)      [] ES (un) (85199):     GOALS:   Patient stated goal: \"Return to normal\"    Therapist goals for Patient:   Short Term Goals: To be achieved in: 2 weeks  1. Independent in HEP and progression per patient tolerance, in order to prevent re-injury. 2. Patient will have a decrease in pain to facilitate improvement in movement, function, and ADLs as indicated by Functional Deficits. Long Term Goals: To be achieved in: 12 weeks  1. Disability index score of 30% or less for the LEFS to assist with reaching prior level of function.    2. Patient will demonstrate increased

## 2019-06-19 NOTE — PRE-PROCEDURE INSTRUCTIONS
Spoke with patient regarding their procedure tomorrow. Patient given these instructions:    1. Arrive at 0730 for your 0900 procedure. 2.  DO NOT eat or drink anything after midnight the night before your procedure. 3.  Take all of your usual morning medications the day of the procedure with just a sip of water as directed by your physician. 4.  Bring a current list of all of your medications with you. 5. Please have a responsible adult drive you home after your procedure. Reviewed all pre-procedure instructions with patient/family member via telephone. Answered all questions.     Astrid Martínez RN

## 2019-06-20 ENCOUNTER — HOSPITAL ENCOUNTER (OUTPATIENT)
Dept: CARDIAC CATH/INVASIVE PROCEDURES | Age: 64
Discharge: HOME | End: 2019-06-22
Payer: MEDICARE

## 2019-06-20 VITALS — BODY MASS INDEX: 29.82 KG/M2 | WEIGHT: 190 LBS | TEMPERATURE: 97.3 F | HEIGHT: 67 IN

## 2019-06-20 DIAGNOSIS — R06.02 SOB (SHORTNESS OF BREATH): ICD-10-CM

## 2019-06-20 DIAGNOSIS — R00.0 TACHYCARDIA: ICD-10-CM

## 2019-06-20 LAB
EKG ATRIAL RATE: 74 BPM
EKG DIAGNOSIS: NORMAL
EKG P AXIS: 69 DEGREES
EKG P-R INTERVAL: 142 MS
EKG Q-T INTERVAL: 372 MS
EKG QRS DURATION: 88 MS
EKG QTC CALCULATION (BAZETT): 412 MS
EKG R AXIS: 68 DEGREES
EKG T AXIS: 23 DEGREES
EKG VENTRICULAR RATE: 74 BPM
LEFT VENTRICULAR EJECTION FRACTION HIGH VALUE: 65 %
LEFT VENTRICULAR EJECTION FRACTION MODE: NORMAL
LV EF: 60 %

## 2019-06-20 PROCEDURE — 93458 L HRT ARTERY/VENTRICLE ANGIO: CPT | Performed by: INTERNAL MEDICINE

## 2019-06-20 PROCEDURE — 99152 MOD SED SAME PHYS/QHP 5/>YRS: CPT | Performed by: INTERNAL MEDICINE

## 2019-06-20 PROCEDURE — 93010 ELECTROCARDIOGRAM REPORT: CPT | Performed by: INTERNAL MEDICINE

## 2019-06-20 PROCEDURE — 93458 L HRT ARTERY/VENTRICLE ANGIO: CPT

## 2019-06-20 PROCEDURE — 93005 ELECTROCARDIOGRAM TRACING: CPT | Performed by: INTERNAL MEDICINE

## 2019-06-20 PROCEDURE — 99152 MOD SED SAME PHYS/QHP 5/>YRS: CPT

## 2019-06-20 PROCEDURE — C1894 INTRO/SHEATH, NON-LASER: HCPCS

## 2019-06-20 PROCEDURE — 2500000003 HC RX 250 WO HCPCS

## 2019-06-20 PROCEDURE — C1887 CATHETER, GUIDING: HCPCS

## 2019-06-20 PROCEDURE — C1769 GUIDE WIRE: HCPCS

## 2019-06-20 PROCEDURE — 6360000004 HC RX CONTRAST MEDICATION: Performed by: INTERNAL MEDICINE

## 2019-06-20 PROCEDURE — 2709999900 HC NON-CHARGEABLE SUPPLY

## 2019-06-20 PROCEDURE — 6360000002 HC RX W HCPCS

## 2019-06-20 PROCEDURE — 99217 PR OBSERVATION CARE DISCHARGE MANAGEMENT: CPT | Performed by: NURSE PRACTITIONER

## 2019-06-20 RX ORDER — SODIUM CHLORIDE 0.9 % (FLUSH) 0.9 %
10 SYRINGE (ML) INJECTION EVERY 12 HOURS SCHEDULED
Status: DISCONTINUED | OUTPATIENT
Start: 2019-06-20 | End: 2019-06-23 | Stop reason: HOSPADM

## 2019-06-20 RX ORDER — SODIUM CHLORIDE 0.9 % (FLUSH) 0.9 %
10 SYRINGE (ML) INJECTION PRN
Status: DISCONTINUED | OUTPATIENT
Start: 2019-06-20 | End: 2019-06-20 | Stop reason: SDUPTHER

## 2019-06-20 RX ORDER — ASPIRIN 325 MG
325 TABLET ORAL ONCE
Status: DISCONTINUED | OUTPATIENT
Start: 2019-06-20 | End: 2019-06-23 | Stop reason: HOSPADM

## 2019-06-20 RX ORDER — ATORVASTATIN CALCIUM 40 MG/1
40 TABLET, FILM COATED ORAL DAILY
Qty: 90 TABLET | Refills: 3 | Status: SHIPPED | OUTPATIENT
Start: 2019-06-20

## 2019-06-20 RX ORDER — SODIUM CHLORIDE 9 MG/ML
INJECTION, SOLUTION INTRAVENOUS CONTINUOUS
Status: DISCONTINUED | OUTPATIENT
Start: 2019-06-20 | End: 2019-06-20 | Stop reason: SDUPTHER

## 2019-06-20 RX ORDER — SODIUM CHLORIDE 0.9 % (FLUSH) 0.9 %
10 SYRINGE (ML) INJECTION EVERY 12 HOURS SCHEDULED
Status: DISCONTINUED | OUTPATIENT
Start: 2019-06-20 | End: 2019-06-20 | Stop reason: SDUPTHER

## 2019-06-20 RX ORDER — METOPROLOL SUCCINATE 100 MG/1
100 TABLET, EXTENDED RELEASE ORAL DAILY
Qty: 30 TABLET | Refills: 3 | Status: CANCELLED | OUTPATIENT
Start: 2019-06-20

## 2019-06-20 RX ORDER — SODIUM CHLORIDE 9 MG/ML
INJECTION, SOLUTION INTRAVENOUS CONTINUOUS
Status: ACTIVE | OUTPATIENT
Start: 2019-06-20 | End: 2019-06-20

## 2019-06-20 RX ORDER — SODIUM CHLORIDE 0.9 % (FLUSH) 0.9 %
10 SYRINGE (ML) INJECTION PRN
Status: DISCONTINUED | OUTPATIENT
Start: 2019-06-20 | End: 2019-06-23 | Stop reason: HOSPADM

## 2019-06-20 RX ORDER — ISOSORBIDE MONONITRATE 30 MG/1
30 TABLET, EXTENDED RELEASE ORAL DAILY
Qty: 90 TABLET | Refills: 3 | Status: SHIPPED | OUTPATIENT
Start: 2019-06-20 | End: 2019-06-28 | Stop reason: SDUPTHER

## 2019-06-20 RX ADMIN — IOHEXOL 150 ML: 350 INJECTION, SOLUTION INTRAVENOUS at 09:52

## 2019-06-20 NOTE — H&P
70 y.o. man with anxiety, irreg heart beats, HTN, and HL. Here for cp. Gets cp on the left side of the chest. Felt like being hit in the chest with a hammer. Seems to be increasingly frequent. Gets it with stress, very infrequently random. Notices it more when exerting himself. Riding a bike consistently brings it on; has since stopped doing that. Had recent knee surgeyr in 2/2019 and did well. No previous MI. No previous stroke. Frequently dizzy. Does have episodes of syncope as well. Seeing EP team.    Has been having the pain for 1.5 years. Past Medical History:   Diagnosis Date    Anxiety     severe pre-op Anxiety    Arthritis     knee    Bladder distention     Patient sees Dr. Bethany Jessica HTN (hypertension) 2/18/2019    Hyperlipidemia     Irregular heart beat     hx of     Knee joint dislocation     left    Urinary incontinence     wears depends    Urinary retention     self cath 3x day/ resolved 11/2018    Wears dentures     full     Past Surgical History:   Procedure Laterality Date    CHOLECYSTECTOMY      CYSTOSCOPY      KNEE ARTHROSCOPY Left 03/03/2017     LEFT KNEE ARTHROSCOPY, SYNOVECTOMY      PROSTATE SURGERY      REVISION TOTAL KNEE ARTHROPLASTY Left 2/18/2019    REVISION LEFT TOTAL KNEE ARTHROPLASTY               LOREDO performed by Pearl Clements MD at [address] [place] [address] 2017     Social History     Tobacco Use    Smoking status: Current Every Day Smoker     Packs/day: 0.50     Years: 30.00     Pack years: 15.00     Types: Cigarettes    Smokeless tobacco: Never Used   Substance Use Topics    Alcohol use: No    Drug use: No     Allergies   Allergen Reactions    Other Anaphylaxis    Percocet [Oxycodone-Acetaminophen] Shortness Of Breath     Felt like a heart attack  Felt like a heart attack      Morphine Other (See Comments)     Other reaction(s):  Other (See Comments)  Hallucinations  Hallucinations  Lights me up    Morphine And Related Other (See Comments)    Nickel     Propoxyphene      Darvocet     Family History   Problem Relation Age of Onset    Heart Disease Mother         defibrillator    Thyroid Disease Father         PPM    Arthritis Sister     Alcohol Abuse Brother         arthritis    Other Sister         Typhoid fever complications    Other Sister         obesity    Cancer Maternal Grandmother     Alzheimer's Disease Maternal Grandmother     Diabetes Paternal Grandmother      Review of Systems   General: No fevers, chills, fatigue, or night sweats. No abnormal changes in weight. HEENT: can have blurred vision and get dizzy with that. Cardiovascular: See HPI. No cramping in legs or buttocks when walking. Respiratory: No cough, hemoptysis, or wheezing. No history of asthma. Gastrointestinal: No abdominal pain, hematochezia, melana, or history of GI ulcers. Genito-Urinary: No dysuria or hematuria. No urgency or polyuria. Musculoskeletal: No complaints of joint pain, joint swelling or muscular weakness/soreness. Neurological: + dizzy. No history of a stroke or TIA. Psychological: + anxiety  Hematological and Lymphatic: No abnormal bleeding or bruising, blood clots, jaundice. Endocrine: No malaise/lethargy, palpitations, polydipsia/polyuria, temperature intolerance or unexpected weight changes. Skin: No rashes or non-healing ulcers. PE:  Temperature 97.3 °F (36.3 °C), temperature source Oral, height 5' 7\" (1.702 m), weight 190 lb (86.2 kg). General (appearance): Well devel. No distress  Eyes: Anicteric. eomi  Neck: Supple. No jvd  Ears/Nose/Mouth/Thorat: No cyanosis  CV: RRR. No m/r/g  Respiratory:  Clear B, normal respiratory effort  GI: Abd s/nt/nd. No peritoneal signs  Skin: Warm, dry. No rashes  Neuro/Psych: Alert and oriented x 3. Appropriate behavior  Ext:  No c/c. Non pitting edema  Pulses:  2+ carotid B. No bruits.  2+ radial    Lab Results   Component Value Date    WBC 8.7 06/11/2019    HGB 16.5 06/11/2019    HCT 49.5 06/11/2019    MCV 95.5 06/11/2019     06/11/2019     Lab Results   Component Value Date     06/11/2019    K 4.6 06/11/2019     06/11/2019    CO2 23 06/11/2019    BUN 10 06/11/2019    CREATININE 0.8 06/11/2019    GLUCOSE 108 (H) 06/11/2019    CALCIUM 9.7 06/11/2019    PROT 7.3 06/11/2019    LABALBU 4.5 06/11/2019    BILITOT 0.7 06/11/2019    ALKPHOS 61 06/11/2019    AST 21 06/11/2019    ALT 30 06/11/2019    LABGLOM >60 06/11/2019    GFRAA >60 06/11/2019    AGRATIO 1.6 06/11/2019    GLOB 2.8 06/11/2019     Lab Results   Component Value Date    CHOL 175 03/12/2019    CHOL 187 03/20/2018    CHOL 202 (H) 10/17/2012     Lab Results   Component Value Date    TRIG 131 03/12/2019    TRIG 178 (H) 03/20/2018    TRIG 151 (H) 10/17/2012     Lab Results   Component Value Date    HDL 29 (L) 03/12/2019    HDL 34 (L) 03/20/2018    HDL 49 01/26/2018     Lab Results   Component Value Date    LDLCALC 120 (H) 03/12/2019    LDLCALC 117 (H) 03/20/2018    LDLCALC 141 (H) 01/26/2018     Lab Results   Component Value Date    LABVLDL 26 03/12/2019    LABVLDL 36 03/20/2018    LABVLDL 27 01/26/2018     No results found for: CHOLHDLRATIO    5/2019 Nuc Stress:  Overall findings represent a low risk scan.    There is normal isotope uptake at stress and rest. There is no evidence of    myocardial ischemia or scar.    Normal LV size and systolic function.    Normal myocardial perfusion study.    Non-diagnostic EKG response due to failure to reach target heart rate.    ECG: Non-diagnostic EKG response due to failure to reach target heart rate. 3/2019 TTE:  Normal LV systolic function with EF of 55-60%.   No regional wall motion abnormalites are seen.   Grade I diastolic dysfunction with normal filling pressure.   Mild mitral regurgitation.   No TR jet to estimate systolic pulmonary artery pressure (SPAP).  Probably   normal.    2012 Cardiac cath: No sig CAD      Current Outpatient Medications:     aspirin (ASPIRIN CHILDRENS) 81 MG chewable tablet, Take 1 tablet by mouth daily, Disp: 30 tablet, Rfl: 3    atorvastatin (LIPITOR) 20 MG tablet, Take 1 tablet by mouth daily, Disp: 90 tablet, Rfl: 3    ivabradine (CORLANOR) 5 MG TABS tablet, Take 1 tablet by mouth 2 times daily (with meals), Disp: 60 tablet, Rfl: 3    omeprazole (PRILOSEC) 40 MG delayed release capsule, Take 40 mg by mouth daily , Disp: , Rfl:     cyclobenzaprine (FLEXERIL) 10 MG tablet, Take 10 mg by mouth 3 times daily, Disp: , Rfl:     metoprolol tartrate (LOPRESSOR) 25 MG tablet, Take 1 tablet by mouth 3 times daily, Disp: 90 tablet, Rfl: 5    traMADol (ULTRAM) 50 MG tablet, Take 50 mg by mouth every 6 hours as needed for Pain., Disp: , Rfl:     A/P:  70 y.o. here for CP. -Chest pain  -Smoking    Recs:  -Will pursue cath. Given contd pain, and with exertion, all in agreement. R/B/A discussed.  -Ni allergy. Need to see what our stents have. May just do diagnostic cath  -Going on cruise in August.  -Stop smoking. R radial access.

## 2019-06-20 NOTE — DISCHARGE SUMMARY
[address] Discharge Note      Patient ID: Curry Aranda 70 y.o. 1955    Admission Date: 6/20/2019     Discharge Date:  6/20/2019    Reason for Admission:  Principal Diagnosis: Coronary artery disease  Secondary Diagnosis: stable angina, hyperlipidemia, hypertension. Procedures:  Coronary angiogram     Brief Summary of Patient's Course:  70 y.o. patient of Dr. Linda Hung with irregular heart beats, HTN, HLD, dizziness, syncope and chest pain who presented for coronary angiogram.  The left main had minor irregularities. The LAD had an ostial/proximal 40% stenosis. There was mid, diffuse, irregular plaque. The Diagonal had an ostial 70% and mid 80% stenosis. The ramus intermedius was small. The LCx had an ostial 30% stenosis. There was on large OM with no significant disease. The RCA was a large vessel with no significant blockages The LVEF was 60-65%. Patient tolerated the procedure well. A TR band was used to obtain hemostasis of the right radial arteriotomy. He was monitored in the holding bay and discharged home. Imdur was started and Lipitor was increased to 40 mg. We will discuss changing Corlanor to metoprolol with Dr. Linda Hung . Discharge Condition:  Good     Discharge Instructions: Activity Limitations:  No heavy lifting. Diet:  low fat and low sodium    Follow Up Instructions: To office in: Follow up with Janice Hathaway NP in 1 week and Dr Linda Hung in 4 weeks  Instructed Re: Discharge medications, activity and dietary restrictions and follow up appointments were discussed.      Discharge Medications:   Yanivtameka Wes   Home Medication Instructions [facility]:[id number]    Printed on:06/20/19 8731   Medication Information                      aspirin (ASPIRIN CHILDRENS) 81 MG chewable tablet  Take 1 tablet by mouth daily             atorvastatin (LIPITOR) 40 MG tablet  Take 1 tablet by mouth daily             cyclobenzaprine (FLEXERIL) 10 MG tablet  Take 10 mg by mouth 3 times daily isosorbide mononitrate (IMDUR) 30 MG extended release tablet  Take 1 tablet by mouth daily             ivabradine (CORLANOR) 5 MG TABS tablet  Take 1 tablet by mouth 2 times daily (with meals)             omeprazole (PRILOSEC) 40 MG delayed release capsule  Take 40 mg by mouth daily              traMADol (ULTRAM) 50 MG tablet  Take 50 mg by mouth every 6 hours as needed for Pain.                                         Attending Physician: Dr. Pablo Peguero on Discharge: greater than 30 minutes

## 2019-06-20 NOTE — PROCEDURES
Heike Ivan  70 y.o.  [id number]    Indication:  Chest pain    After informed consent was obtained and history and exam reviewed, the patient was taken to the cardiac cath lab. The patient had the right groin and the right wrist prepped and draped in sterile fashion. 1% Xylocaine was used to anesthetize the right wrist.  A needle was inserted into the radial artery and a 5/6 Fr glide sheath was inserted. Continuous pulse-oximetry and ECG monitoring was performed, and frequent blood pressure assessment was performed. A solution of 200 mcg NCT, 2.5 mg Verapamil, and 3,000 U of heparin was injected via the sheath. A 5 Western Tanna Chester catheter was advanced over a Wholey wire into the ascending aorta. The wire was withdrawn and the left main coronary artery was engaged. Digital angiograms were recorded. The Chester catheter was then removed from the left main coronary artery and was used to engage the right coronary artery. Digital angiograms were recorded. The Chseter catheter was then exchanged over an exchange length J wire for a 5 Fr pigtail catheter. The pigtail catheter was advanced into the left ventricle. Pressures were recorded. A ventriculogram was performed. Pressures post ventriculography were recorded, and a pullback across the aortic valve was performed    Hemostasis was obtained by TR Band    Complications: None    Estimated blood loss: < 30 mL    Sedation: 2 mg Versed, 50 mcg Fentanyl  Sedation start: 0934  Sedation stop: 0949    Angiographic Findings:  Right dominant system. Diffuse calcified plaque  Left Main:  Minor irregularities. Left Anterior Descending:  Ostial/proximal 40% stenosis. Mid diffuse irregular plaque; no stenosis greater than 30%. Richad Stands has an ostial 70% stenosis and mid 80% stenosis; this is the only significant diagonal branch. Ramus Intermedius: Small. Circumflex:  Ostial 30% stenosis. Large proximal vessel with small AV circumflex branch.  One very large OM with no significant disease. Right Coronary:  Large vessel. Normal. Some catheter spasm noted that improved with withdrawing the catheter. Left Ventriculogram:  LVEF 60-65%    Hemodynamics (mm Hg):  Left Ventricular Pressure:  140/8, 15  Central Aortic Pressure:  136/77 (103)    Conclusions:  -Diffuse CAD. Only major obstructive disease in a diagonal branch. -Mildly elevated LVEDP  -Normal LVEF    Recommendations:  -Maximize medical therapy.

## 2019-06-20 NOTE — PLAN OF CARE
Wears dentures     full         Surgical History:  Past Surgical History:   Procedure Laterality Date    CHOLECYSTECTOMY      CYSTOSCOPY      KNEE ARTHROSCOPY Left 03/03/2017     LEFT KNEE ARTHROSCOPY, SYNOVECTOMY      PROSTATE SURGERY      REVISION TOTAL KNEE ARTHROPLASTY Left 2/18/2019    REVISION LEFT TOTAL KNEE ARTHROPLASTY               LOREDO performed by Demi Helms MD at [address] Left 2017         Medications:  Current Outpatient Medications   Medication Sig Dispense Refill    aspirin (ASPIRIN CHILDRENS) 81 MG chewable tablet Take 1 tablet by mouth daily 30 tablet 3    atorvastatin (LIPITOR) 20 MG tablet Take 1 tablet by mouth daily 90 tablet 3    ivabradine (CORLANOR) 5 MG TABS tablet Take 1 tablet by mouth 2 times daily (with meals) 60 tablet 3    omeprazole (PRILOSEC) 40 MG delayed release capsule Take 40 mg by mouth daily       cyclobenzaprine (FLEXERIL) 10 MG tablet Take 10 mg by mouth 3 times daily      metoprolol tartrate (LOPRESSOR) 25 MG tablet Take 1 tablet by mouth 3 times daily 90 tablet 5    traMADol (ULTRAM) 50 MG tablet Take 50 mg by mouth every 6 hours as needed for Pain. Current Facility-Administered Medications   Medication Dose Route Frequency Provider Last Rate Last Dose    0.9 % sodium chloride infusion   Intravenous Continuous Pierre Lacey MD        aspirin tablet 325 mg  325 mg Oral Once Pierre Lacey MD        sodium chloride flush 0.9 % injection 10 mL  10 mL Intravenous 2 times per day Pierre Lacey MD        sodium chloride flush 0.9 % injection 10 mL  10 mL Intravenous PRN Pierre Lacey MD               Pre-Sedation:    Pre-Sedation Documentation and Exam:  I have personally completed a history, physical exam & review of systems for this patient (see notes).     Prior History of Anesthesia Complications:   none    Modified Mallampati:  II (soft palate, uvula, fauces visible)    ASA Classification:  Class 3 - A patient with severe systemic disease that limits activity but is not incapacitating and Class 2 -- A normal healthy patient with mild systemic disease    Arnol Scale: Activity:  2 - Able to move 4 extremities voluntarily on command  Respiration:  2 - Able to breathe deeply and cough freely  Circulation:  2 - BP+/- 20mmHg of normal  Consciousness:  2 - Fully awake  Oxygen Saturation (color):  2 - Able to maintain oxygen saturation >92% on room air    Sedation/Anesthesia Plan:  Guard the patient's safety and welfare. Minimize physical discomfort and pain. Minimize negative psychological responses to treatment by providing sedation and analgesia and maximize the potential amnesia. Patient to meet pre-procedure discharge plan. Medication Planned:  midazolam intravenously, fentanyl intravenously    Patient is an appropriate candidate for plan of sedation: yes    Despite normal stress test, symptoms continued. He is a long-time smoker. High clinical risk of CAD and false negative stress is a concern.       Electronically signed by Bryant Wade MD on 6/20/2019 at 9:23 AM

## 2019-06-25 ENCOUNTER — NURSE ONLY (OUTPATIENT)
Dept: CARDIOLOGY CLINIC | Age: 64
End: 2019-06-25
Payer: MEDICARE

## 2019-06-25 DIAGNOSIS — R00.2 HEART PALPITATIONS: ICD-10-CM

## 2019-06-25 DIAGNOSIS — Z45.09 ENCOUNTER FOR ELECTRONIC ANALYSIS OF REVEAL EVENT RECORDER: ICD-10-CM

## 2019-06-25 DIAGNOSIS — R00.0 TACHYCARDIA: ICD-10-CM

## 2019-06-25 PROCEDURE — 93299 PR REM INTERROG ICPMS/SCRMS <30 D TECH REVIEW: CPT | Performed by: INTERNAL MEDICINE

## 2019-06-25 PROCEDURE — 93298 REM INTERROG DEV EVAL SCRMS: CPT | Performed by: INTERNAL MEDICINE

## 2019-06-27 DIAGNOSIS — M25.562 LEFT KNEE PAIN, UNSPECIFIED CHRONICITY: Primary | ICD-10-CM

## 2019-06-27 NOTE — PROGRESS NOTES
CC/HPI:  70 y.o. patient of Dr. Martínez Jensen and Dr An Blackman with CAD, HTN, HLD, irregular heart beats and syncope who is here for follow up after coronary angiogram. He conts to have left sides, exertional, sharp pain. Feels worn out and tired. Has SOB at times and a \"smoker's cough\". No LH/dizziness. Palpitations or less often. No GI/ bleeding. Occasional headache. Denies right radial pain. Past Medical History:   Diagnosis Date    Anxiety     severe pre-op Anxiety    Arthritis     knee    Bladder distention     Patient sees Dr. Jodie Cisse Coronary artery disease of native artery of native heart with stable angina pectoris ([facility] [address]     HTN (hypertension) 2/18/2019    Hyperlipidemia     Irregular heart beat     hx of     Knee joint dislocation     left    Urinary incontinence     wears depends    Urinary retention     self cath 3x day/ resolved 11/2018    Wears dentures     full     Past Surgical History:   Procedure Laterality Date    CHOLECYSTECTOMY      CYSTOSCOPY      KNEE ARTHROSCOPY Left 03/03/2017     LEFT KNEE ARTHROSCOPY, SYNOVECTOMY      PROSTATE SURGERY      REVISION TOTAL KNEE ARTHROPLASTY Left 2/18/2019    REVISION LEFT TOTAL KNEE ARTHROPLASTY               LOREDO performed by Saqib Chandler MD at [address] Left 2017     Family History   Problem Relation Age of Onset    Heart Disease Mother         defibrillator    Thyroid Disease Father         PPM    Arthritis Sister     Alcohol Abuse Brother         arthritis    Other Sister         Typhoid fever complications    Other Sister         obesity    Cancer Maternal Grandmother     Alzheimer's Disease Maternal Grandmother     Diabetes Paternal Grandmother      Social History     Tobacco Use    Smoking status: Current Every Day Smoker     Packs/day: 0.50     Years: 30.00     Pack years: 15.00     Types: Cigarettes    Smokeless tobacco: Never Used   Substance Use Topics    Alcohol use: No    Drug use:  No Allergies: Other; Percocet [oxycodone-acetaminophen]; Morphine; Morphine and related; Nickel; and Propoxyphene    Review of Systems  General: No changes in weight, fatigue, or night sweats. HEENT: No blurry or decreased vision. No changes in hearing, nasal discharge or sore throat. Cardiovascular:  See HPI. Respiratory: No cough, hemoptysis, or wheezing. No history of asthma. Gastrointestinal:  No abdominal pain, hematochezia, melana, constipation, diarrhea, or history of GI ulcers. Genito-Urinary: No dysuria or hematuria. No urgency or polyuria. Musculoskeletal:  No complaints of joint pain, joint swelling or muscular weakness/soreness. Neurological:  No dizziness, headaches, numbness/tingling, speech problems or weakness. No history of a stroke or TIA. Psychological:  No anxiety or depression. Hematological and Lymphatic: No abnormal bleeding or bruising, blood clots, jaundice or swollen lymph nodes. Endocrine:   No malaise/lethargy, palpitations, polydipsia/polyuria, temperature intolerance or unexpected weight changes  Skin:  No rashes or non-healing ulcers. Physical Exam:  Vitals:    06/28/19 1445   BP: (!) 110/58   Pulse: 88       General (appearance):  No acute distress  Eyes: anicteric   Neck: soft, No JVD  Ears/Nose/Mouth/Thorat: No cyanosis  CV: RRR   Respiratory:  Mild, diffuse wheeze  GI: soft, non-tender, non-distended  Skin: Warm, dry. No rashes  Neuro/Psych: Alert and oriented x 3. Appropriate behavior  Ext:  No c/c. No edema  Pulses:  2+ right radial. Right radial soft, no hematoma. + distal pulses and good cap refill. Weight  Discharge:    Today:        CBC:   Lab Results   Component Value Date    WBC 8.7 06/11/2019    HGB 16.5 06/11/2019    HCT 49.5 06/11/2019    MCV 95.5 06/11/2019     06/11/2019     BMP:  Lab Results   Component Value Date    CREATININE 0.8 06/11/2019    BUN 10 06/11/2019     06/11/2019    K 4.6 06/11/2019     06/11/2019    CO2 23 2019     Mag:   Lab Results   Component Value Date    MG 2.10 2018     LIVER PROFILE:   Lab Results   Component Value Date    ALT 30 2019    AST 21 2019    ALKPHOS 61 2019    BILITOT 0.7 2019     PT/INR:   Lab Results   Component Value Date    INR 1.02 2019    INR 1.03 2019    INR 1.11 2019    PROTIME 11.6 2019    PROTIME 11.7 2019    PROTIME 12.6 2019     Pro-BNP   Lab Results   Component Value Date    PROBNP 38 2018     LIPIDS:  No components found for: CHLPL  Lab Results   Component Value Date    TRIG 131 2019    TRIG 178 (H) 2018    TRIG 151 (H) 10/17/2012     Lab Results   Component Value Date    HDL 29 (L) 2019    HDL 34 (L) 2018    HDL 49 2018     Lab Results   Component Value Date    LDLCALC 120 (H) 2019    LDLCALC 117 (H) 2018    LDLCALC 141 (H) 2018     Lab Results   Component Value Date    LABVLDL 26 2019    LABVLDL 36 2018    LABVLDL 27 2018     TSH:No results found for: TSH, T3IYNIE, D4VTHBS, THYROIDAB    IMAGIN2019 Coronary angiogram:  Angiographic Findings:  Right dominant system. Diffuse calcified plaque  Left Main:  Minor irregularities. Left Anterior Descending:  Ostial/proximal 40% stenosis. Mid diffuse irregular plaque; no stenosis greater than 30%. Iqra Collins has an ostial 70% stenosis and mid 80% stenosis; this is the only significant diagonal branch. Ramus Intermedius: Small. Circumflex:  Ostial 30% stenosis. Large proximal vessel with small AV circumflex branch. One very large OM with no significant disease. Right Coronary:  Large vessel. Normal. Some catheter spasm noted that improved with withdrawing the catheter.   Left Ventriculogram:  LVEF 60-65%  Hemodynamics (mm Hg):  Left Ventricular Pressure:  140/8, 15  Central Aortic Pressure:  136/77 (103)    2019 Nuc stress:      Overall findings represent a low risk scan.   Fabiolapastora Ricardo is normal isotope uptake at stress and rest. There is no evidence of    myocardial ischemia or scar.    Normal LV size and systolic function.    Normal myocardial perfusion study.    Non-diagnostic EKG response due to failure to reach target heart rate.    ECG: Non-diagnostic EKG response due to failure to reach target heart rate. 3/27/2019 Echo:    Normal LV systolic function with EF of 55-60%.   No regional wall motion abnormalites are seen.   Grade I diastolic dysfunction with normal filling pressure.   Mild mitral regurgitation.   No TR jet to estimate systolic pulmonary artery pressure (SPAP). Probably   normal.      Medications:   Current Outpatient Medications   Medication Sig Dispense Refill    isosorbide mononitrate (IMDUR) 30 MG extended release tablet Take 1 tablet by mouth daily 90 tablet 3    atorvastatin (LIPITOR) 40 MG tablet Take 1 tablet by mouth daily 90 tablet 3    aspirin (ASPIRIN CHILDRENS) 81 MG chewable tablet Take 1 tablet by mouth daily 30 tablet 3    ivabradine (CORLANOR) 5 MG TABS tablet Take 1 tablet by mouth 2 times daily (with meals) 60 tablet 3    cyclobenzaprine (FLEXERIL) 10 MG tablet Take 10 mg by mouth 3 times daily      traMADol (ULTRAM) 50 MG tablet Take 50 mg by mouth every 6 hours as needed for Pain.  omeprazole (PRILOSEC) 40 MG delayed release capsule Take 40 mg by mouth daily        No current facility-administered medications for this visit. Assessment:  1. S/P coronary angiogram    2. Hypercholesterolemia    3. Heart palpitations    4.  Essential hypertension      Plan:  -Cont asa, lipitor, corlanor  -Follow up with EP to discuss corlanor (too expensive)  -Increase imdur to 60 mg po daily

## 2019-06-28 ENCOUNTER — OFFICE VISIT (OUTPATIENT)
Dept: CARDIOLOGY CLINIC | Age: 64
End: 2019-06-28
Payer: MEDICARE

## 2019-06-28 VITALS
HEART RATE: 88 BPM | WEIGHT: 194.6 LBS | SYSTOLIC BLOOD PRESSURE: 110 MMHG | DIASTOLIC BLOOD PRESSURE: 58 MMHG | BODY MASS INDEX: 30.48 KG/M2

## 2019-06-28 DIAGNOSIS — I10 ESSENTIAL HYPERTENSION: ICD-10-CM

## 2019-06-28 DIAGNOSIS — R00.2 HEART PALPITATIONS: ICD-10-CM

## 2019-06-28 DIAGNOSIS — E78.00 HYPERCHOLESTEROLEMIA: ICD-10-CM

## 2019-06-28 DIAGNOSIS — Z98.890 S/P CORONARY ANGIOGRAM: Primary | ICD-10-CM

## 2019-06-28 PROCEDURE — G8417 CALC BMI ABV UP PARAM F/U: HCPCS | Performed by: NURSE PRACTITIONER

## 2019-06-28 PROCEDURE — G8598 ASA/ANTIPLAT THER USED: HCPCS | Performed by: NURSE PRACTITIONER

## 2019-06-28 PROCEDURE — 99214 OFFICE O/P EST MOD 30 MIN: CPT | Performed by: NURSE PRACTITIONER

## 2019-06-28 PROCEDURE — 3017F COLORECTAL CA SCREEN DOC REV: CPT | Performed by: NURSE PRACTITIONER

## 2019-06-28 PROCEDURE — 4004F PT TOBACCO SCREEN RCVD TLK: CPT | Performed by: NURSE PRACTITIONER

## 2019-06-28 PROCEDURE — 1123F ACP DISCUSS/DSCN MKR DOCD: CPT | Performed by: NURSE PRACTITIONER

## 2019-06-28 PROCEDURE — 4040F PNEUMOC VAC/ADMIN/RCVD: CPT | Performed by: NURSE PRACTITIONER

## 2019-06-28 PROCEDURE — G8427 DOCREV CUR MEDS BY ELIG CLIN: HCPCS | Performed by: NURSE PRACTITIONER

## 2019-06-28 RX ORDER — ISOSORBIDE MONONITRATE 60 MG/1
60 TABLET, EXTENDED RELEASE ORAL DAILY
Qty: 90 TABLET | Refills: 3 | Status: SHIPPED | OUTPATIENT
Start: 2019-06-28 | End: 2019-07-02 | Stop reason: DRUGHIGH

## 2019-07-01 NOTE — PROGRESS NOTES
palps and CP and the side effects of medications. Tobacco use was discussed with the patient and education provided. All questions and concerns were addressed to the patient/family. Alternatives to my treatment were discussed. The note was completed using EMR. Every effort was made to ensure accuracy; however, inadvertent computerized transcription errors may be present. Patient received education regarding their diagnosis, treatment and medications while in the office today.       Brianne Mckeon [address] [facility]

## 2019-07-02 ENCOUNTER — OFFICE VISIT (OUTPATIENT)
Dept: CARDIOLOGY CLINIC | Age: 64
End: 2019-07-02
Payer: MEDICARE

## 2019-07-02 VITALS
WEIGHT: 194.2 LBS | HEART RATE: 52 BPM | DIASTOLIC BLOOD PRESSURE: 70 MMHG | BODY MASS INDEX: 30.42 KG/M2 | SYSTOLIC BLOOD PRESSURE: 130 MMHG

## 2019-07-02 DIAGNOSIS — R00.2 PALPITATIONS: Primary | ICD-10-CM

## 2019-07-02 DIAGNOSIS — I25.10 CORONARY ARTERY DISEASE INVOLVING NATIVE CORONARY ARTERY OF NATIVE HEART WITHOUT ANGINA PECTORIS: ICD-10-CM

## 2019-07-02 DIAGNOSIS — R00.0 INAPPROPRIATE SINUS TACHYCARDIA: ICD-10-CM

## 2019-07-02 DIAGNOSIS — I10 ESSENTIAL HYPERTENSION: ICD-10-CM

## 2019-07-02 PROCEDURE — 4004F PT TOBACCO SCREEN RCVD TLK: CPT | Performed by: NURSE PRACTITIONER

## 2019-07-02 PROCEDURE — 4040F PNEUMOC VAC/ADMIN/RCVD: CPT | Performed by: NURSE PRACTITIONER

## 2019-07-02 PROCEDURE — G8427 DOCREV CUR MEDS BY ELIG CLIN: HCPCS | Performed by: NURSE PRACTITIONER

## 2019-07-02 PROCEDURE — G8417 CALC BMI ABV UP PARAM F/U: HCPCS | Performed by: NURSE PRACTITIONER

## 2019-07-02 PROCEDURE — G8598 ASA/ANTIPLAT THER USED: HCPCS | Performed by: NURSE PRACTITIONER

## 2019-07-02 PROCEDURE — 93000 ELECTROCARDIOGRAM COMPLETE: CPT | Performed by: NURSE PRACTITIONER

## 2019-07-02 PROCEDURE — 3017F COLORECTAL CA SCREEN DOC REV: CPT | Performed by: NURSE PRACTITIONER

## 2019-07-02 PROCEDURE — 99214 OFFICE O/P EST MOD 30 MIN: CPT | Performed by: NURSE PRACTITIONER

## 2019-07-02 PROCEDURE — 1123F ACP DISCUSS/DSCN MKR DOCD: CPT | Performed by: NURSE PRACTITIONER

## 2019-07-02 RX ORDER — ISOSORBIDE MONONITRATE 30 MG/1
30 TABLET, EXTENDED RELEASE ORAL 2 TIMES DAILY
Qty: 60 TABLET | Refills: 5 | Status: SHIPPED | OUTPATIENT
Start: 2019-07-02 | End: 2019-07-05 | Stop reason: SDUPTHER

## 2019-07-05 ENCOUNTER — TELEPHONE (OUTPATIENT)
Dept: CARDIOLOGY CLINIC | Age: 64
End: 2019-07-05

## 2019-07-05 RX ORDER — ISOSORBIDE MONONITRATE 30 MG/1
30 TABLET, EXTENDED RELEASE ORAL 2 TIMES DAILY
Qty: 180 TABLET | Refills: 1 | Status: SHIPPED | OUTPATIENT
Start: 2019-07-05 | End: 2019-07-19

## 2019-07-10 ENCOUNTER — TELEPHONE (OUTPATIENT)
Dept: FAMILY MEDICINE CLINIC | Age: 64
End: 2019-07-10

## 2019-07-10 RX ORDER — SCOLOPAMINE TRANSDERMAL SYSTEM 1 MG/1
1 PATCH, EXTENDED RELEASE TRANSDERMAL
Qty: 10 PATCH | Refills: 0 | Status: ON HOLD | OUTPATIENT
Start: 2019-07-10 | End: 2019-09-18

## 2019-07-10 NOTE — TELEPHONE ENCOUNTER
Patient stated he is going on a 10 day cruise and requesting a prescription for motion sickness. Stated he would like to have the patches if possible.

## 2019-07-19 ENCOUNTER — TELEPHONE (OUTPATIENT)
Dept: ORTHOPEDIC SURGERY | Age: 64
End: 2019-07-19

## 2019-07-19 RX ORDER — ISOSORBIDE MONONITRATE 30 MG/1
90 TABLET, EXTENDED RELEASE ORAL 2 TIMES DAILY
Qty: 180 TABLET | Refills: 1 | Status: SHIPPED | OUTPATIENT
Start: 2019-07-19 | End: 2019-07-22

## 2019-07-19 NOTE — TELEPHONE ENCOUNTER
RECS RECORDS REQUEST FROM [employer] REGARDING DR Marcial Gibson. DR Marcial Gibson RETIRED 12/2017.  FAXED TO [facility] REQUEST TO BE SENT

## 2019-07-22 ENCOUNTER — OFFICE VISIT (OUTPATIENT)
Dept: CARDIOLOGY CLINIC | Age: 64
End: 2019-07-22
Payer: MEDICARE

## 2019-07-22 ENCOUNTER — TELEPHONE (OUTPATIENT)
Dept: ORTHOPEDIC SURGERY | Age: 64
End: 2019-07-22

## 2019-07-22 VITALS
WEIGHT: 196.6 LBS | BODY MASS INDEX: 30.79 KG/M2 | DIASTOLIC BLOOD PRESSURE: 80 MMHG | SYSTOLIC BLOOD PRESSURE: 120 MMHG | HEART RATE: 64 BPM

## 2019-07-22 DIAGNOSIS — E78.00 HYPERCHOLESTEROLEMIA: ICD-10-CM

## 2019-07-22 DIAGNOSIS — I10 ESSENTIAL HYPERTENSION: ICD-10-CM

## 2019-07-22 DIAGNOSIS — I25.118 CORONARY ARTERY DISEASE OF NATIVE ARTERY OF NATIVE HEART WITH STABLE ANGINA PECTORIS (HCC): Primary | ICD-10-CM

## 2019-07-22 DIAGNOSIS — R00.0 INAPPROPRIATE SINUS TACHYCARDIA: ICD-10-CM

## 2019-07-22 PROCEDURE — G8427 DOCREV CUR MEDS BY ELIG CLIN: HCPCS | Performed by: NURSE PRACTITIONER

## 2019-07-22 PROCEDURE — 4004F PT TOBACCO SCREEN RCVD TLK: CPT | Performed by: NURSE PRACTITIONER

## 2019-07-22 PROCEDURE — 1123F ACP DISCUSS/DSCN MKR DOCD: CPT | Performed by: NURSE PRACTITIONER

## 2019-07-22 PROCEDURE — 99214 OFFICE O/P EST MOD 30 MIN: CPT | Performed by: NURSE PRACTITIONER

## 2019-07-22 PROCEDURE — G8598 ASA/ANTIPLAT THER USED: HCPCS | Performed by: NURSE PRACTITIONER

## 2019-07-22 PROCEDURE — G8417 CALC BMI ABV UP PARAM F/U: HCPCS | Performed by: NURSE PRACTITIONER

## 2019-07-22 PROCEDURE — 3017F COLORECTAL CA SCREEN DOC REV: CPT | Performed by: NURSE PRACTITIONER

## 2019-07-22 PROCEDURE — 4040F PNEUMOC VAC/ADMIN/RCVD: CPT | Performed by: NURSE PRACTITIONER

## 2019-07-22 RX ORDER — NITROGLYCERIN 0.4 MG/1
0.4 TABLET SUBLINGUAL EVERY 5 MIN PRN
Qty: 25 TABLET | Refills: 3 | Status: SHIPPED | OUTPATIENT
Start: 2019-07-22

## 2019-07-23 ENCOUNTER — HOSPITAL ENCOUNTER (OUTPATIENT)
Dept: CARDIAC REHAB | Age: 64
Setting detail: THERAPIES SERIES
Discharge: HOME | End: 2019-07-23
Payer: MEDICARE

## 2019-07-26 ENCOUNTER — HOSPITAL ENCOUNTER (OUTPATIENT)
Dept: CARDIAC REHAB | Age: 64
Setting detail: THERAPIES SERIES
Discharge: HOME | End: 2019-07-26
Payer: MEDICARE

## 2019-07-26 PROCEDURE — 93798 PHYS/QHP OP CAR RHAB W/ECG: CPT

## 2019-07-29 ENCOUNTER — APPOINTMENT (OUTPATIENT)
Dept: CARDIAC REHAB | Age: 64
End: 2019-07-29
Payer: MEDICARE

## 2019-07-29 ENCOUNTER — HOSPITAL ENCOUNTER (OUTPATIENT)
Dept: CARDIAC REHAB | Age: 64
Setting detail: THERAPIES SERIES
Discharge: HOME | End: 2019-07-29
Payer: MEDICARE

## 2019-07-29 PROCEDURE — 93798 PHYS/QHP OP CAR RHAB W/ECG: CPT

## 2019-07-30 ENCOUNTER — NURSE ONLY (OUTPATIENT)
Dept: CARDIOLOGY CLINIC | Age: 64
End: 2019-07-30
Payer: MEDICARE

## 2019-07-30 DIAGNOSIS — R00.2 HEART PALPITATIONS: ICD-10-CM

## 2019-07-30 DIAGNOSIS — Z45.09 ENCOUNTER FOR ELECTRONIC ANALYSIS OF REVEAL EVENT RECORDER: ICD-10-CM

## 2019-07-30 PROCEDURE — 93299 PR REM INTERROG ICPMS/SCRMS <30 D TECH REVIEW: CPT | Performed by: INTERNAL MEDICINE

## 2019-07-30 PROCEDURE — 93298 REM INTERROG DEV EVAL SCRMS: CPT | Performed by: INTERNAL MEDICINE

## 2019-07-30 NOTE — LETTER
[address] [facility] [phone]  [facility] [phone]  [address]  [address] [address] [phone]    Pacemaker/Defibrillator Clinic      08/05/19        [address] [address]        Dear Pedro Wright    This letter is to inform you that we received the transmission from your monitor at home that checks your pacemaker and/or defibrillator, or implanted heart monitor. The next date your monitor will automatically transmit will be 9/17/19. Please do not send additional routine transmissions unless specifically requested. Your device and monitor are wireless and most transmit cellularly, but please periodically check your monitor is still plugged in to the electrical outlet. If you still use the telephone land line to send please ensure the connection to the phone remy is secure. This will help to ensure successful automatic transmissions in the future. Also, the monitor needs to be close to you while sleeping at night. Please be aware that the remote device transmission sites are periodically monitored only during regular business hours during which simultaneous in-office device clinics are being run. If your transmission requires attention, we will contact you as soon as possible. Please also keep in mind that implanted devices should be checked in the office manually once per year. Please call our office if you need to schedule this visit. Thank you.             Rick Van

## 2019-07-31 ENCOUNTER — APPOINTMENT (OUTPATIENT)
Dept: CARDIAC REHAB | Age: 64
End: 2019-07-31
Payer: MEDICARE

## 2019-08-02 ENCOUNTER — APPOINTMENT (OUTPATIENT)
Dept: CARDIAC REHAB | Age: 64
End: 2019-08-02
Payer: MEDICARE

## 2019-08-02 ENCOUNTER — HOSPITAL ENCOUNTER (OUTPATIENT)
Dept: CARDIAC REHAB | Age: 64
Setting detail: THERAPIES SERIES
Discharge: HOME | End: 2019-08-02
Payer: MEDICARE

## 2019-08-02 PROCEDURE — 93798 PHYS/QHP OP CAR RHAB W/ECG: CPT

## 2019-08-05 ENCOUNTER — HOSPITAL ENCOUNTER (OUTPATIENT)
Dept: CARDIAC REHAB | Age: 64
Setting detail: THERAPIES SERIES
Discharge: HOME | End: 2019-08-05
Payer: MEDICARE

## 2019-08-05 ENCOUNTER — APPOINTMENT (OUTPATIENT)
Dept: CARDIAC REHAB | Age: 64
End: 2019-08-05
Payer: MEDICARE

## 2019-08-05 PROCEDURE — 93798 PHYS/QHP OP CAR RHAB W/ECG: CPT

## 2019-08-07 ENCOUNTER — APPOINTMENT (OUTPATIENT)
Dept: CARDIAC REHAB | Age: 64
End: 2019-08-07
Payer: MEDICARE

## 2019-08-07 ENCOUNTER — HOSPITAL ENCOUNTER (OUTPATIENT)
Dept: CARDIAC REHAB | Age: 64
Setting detail: THERAPIES SERIES
Discharge: HOME | End: 2019-08-07
Payer: MEDICARE

## 2019-08-07 PROCEDURE — 93798 PHYS/QHP OP CAR RHAB W/ECG: CPT

## 2019-08-09 ENCOUNTER — APPOINTMENT (OUTPATIENT)
Dept: CARDIAC REHAB | Age: 64
End: 2019-08-09
Payer: MEDICARE

## 2019-08-09 ENCOUNTER — HOSPITAL ENCOUNTER (OUTPATIENT)
Dept: CARDIAC REHAB | Age: 64
Setting detail: THERAPIES SERIES
Discharge: HOME | End: 2019-08-09
Payer: MEDICARE

## 2019-08-09 PROCEDURE — 93798 PHYS/QHP OP CAR RHAB W/ECG: CPT

## 2019-08-12 ENCOUNTER — APPOINTMENT (OUTPATIENT)
Dept: CARDIAC REHAB | Age: 64
End: 2019-08-12
Payer: MEDICARE

## 2019-08-12 ENCOUNTER — HOSPITAL ENCOUNTER (OUTPATIENT)
Dept: CARDIAC REHAB | Age: 64
Setting detail: THERAPIES SERIES
Discharge: HOME | End: 2019-08-12
Payer: MEDICARE

## 2019-08-12 PROCEDURE — 93798 PHYS/QHP OP CAR RHAB W/ECG: CPT

## 2019-08-13 ENCOUNTER — OFFICE VISIT (OUTPATIENT)
Dept: CARDIOLOGY CLINIC | Age: 64
End: 2019-08-13
Payer: MEDICARE

## 2019-08-13 ENCOUNTER — NURSE ONLY (OUTPATIENT)
Dept: CARDIOLOGY CLINIC | Age: 64
End: 2019-08-13
Payer: MEDICARE

## 2019-08-13 VITALS
WEIGHT: 198.4 LBS | DIASTOLIC BLOOD PRESSURE: 84 MMHG | HEART RATE: 71 BPM | HEIGHT: 67 IN | SYSTOLIC BLOOD PRESSURE: 138 MMHG | BODY MASS INDEX: 31.14 KG/M2

## 2019-08-13 DIAGNOSIS — R00.0 INAPPROPRIATE SINUS TACHYCARDIA: Primary | ICD-10-CM

## 2019-08-13 DIAGNOSIS — R06.02 SOB (SHORTNESS OF BREATH): ICD-10-CM

## 2019-08-13 DIAGNOSIS — R07.9 CHEST PAIN, UNSPECIFIED TYPE: ICD-10-CM

## 2019-08-13 DIAGNOSIS — R00.2 HEART PALPITATIONS: ICD-10-CM

## 2019-08-13 DIAGNOSIS — Z45.09 ENCOUNTER FOR ELECTRONIC ANALYSIS OF REVEAL EVENT RECORDER: ICD-10-CM

## 2019-08-13 PROCEDURE — 93000 ELECTROCARDIOGRAM COMPLETE: CPT | Performed by: NURSE PRACTITIONER

## 2019-08-13 PROCEDURE — 1123F ACP DISCUSS/DSCN MKR DOCD: CPT | Performed by: NURSE PRACTITIONER

## 2019-08-13 PROCEDURE — G8598 ASA/ANTIPLAT THER USED: HCPCS | Performed by: NURSE PRACTITIONER

## 2019-08-13 PROCEDURE — 99214 OFFICE O/P EST MOD 30 MIN: CPT | Performed by: NURSE PRACTITIONER

## 2019-08-13 PROCEDURE — 4040F PNEUMOC VAC/ADMIN/RCVD: CPT | Performed by: NURSE PRACTITIONER

## 2019-08-13 PROCEDURE — 93291 INTERROG DEV EVAL SCRMS IP: CPT | Performed by: INTERNAL MEDICINE

## 2019-08-13 PROCEDURE — 3017F COLORECTAL CA SCREEN DOC REV: CPT | Performed by: NURSE PRACTITIONER

## 2019-08-13 PROCEDURE — 4004F PT TOBACCO SCREEN RCVD TLK: CPT | Performed by: NURSE PRACTITIONER

## 2019-08-13 PROCEDURE — G8417 CALC BMI ABV UP PARAM F/U: HCPCS | Performed by: NURSE PRACTITIONER

## 2019-08-13 PROCEDURE — G8427 DOCREV CUR MEDS BY ELIG CLIN: HCPCS | Performed by: NURSE PRACTITIONER

## 2019-08-13 RX ORDER — ISOSORBIDE MONONITRATE 30 MG/1
30 TABLET, EXTENDED RELEASE ORAL 2 TIMES DAILY
Status: ON HOLD | COMMUNITY
Start: 2019-07-26 | End: 2019-09-18

## 2019-08-13 NOTE — PROGRESS NOTES
needed for Chest pain up to max of 3 total doses. If no relief after 1 dose, call 911. 25 tablet 3    scopolamine (TRANSDERM-SCOP, 1.5 MG,) transdermal patch Place 1 patch onto the skin every 72 hours 10 patch 0    atorvastatin (LIPITOR) 40 MG tablet Take 1 tablet by mouth daily 90 tablet 3    aspirin (ASPIRIN CHILDRENS) 81 MG chewable tablet Take 1 tablet by mouth daily 30 tablet 3    ivabradine (CORLANOR) 5 MG TABS tablet Take 1 tablet by mouth 2 times daily (with meals) 60 tablet 3    omeprazole (PRILOSEC) 40 MG delayed release capsule Take 40 mg by mouth daily        No current facility-administered medications on file prior to visit. Past Medical History:   Diagnosis Date    Anxiety     severe pre-op Anxiety    Arthritis     knee    Bladder distention     Patient sees Dr. Keith Ch Coronary artery disease of native artery of native heart with stable angina pectoris ([facility] [address]     HTN (hypertension) 2/18/2019    Hyperlipidemia     Irregular heart beat     hx of     Knee joint dislocation     left    Urinary incontinence     wears depends    Urinary retention     self cath 3x day/ resolved 11/2018    Wears dentures     full        Past Surgical History:   Procedure Laterality Date    CHOLECYSTECTOMY      CYSTOSCOPY      KNEE ARTHROSCOPY Left 03/03/2017     LEFT KNEE ARTHROSCOPY, SYNOVECTOMY      PROSTATE SURGERY      REVISION TOTAL KNEE ARTHROPLASTY Left 2/18/2019    REVISION LEFT TOTAL KNEE ARTHROPLASTY               LOREDO performed by Gerri Anton MD at [address] [place] [address] Left 2017       Allergies   Allergen Reactions    Other Anaphylaxis    Percocet [Oxycodone-Acetaminophen] Shortness Of Breath     Felt like a heart attack  Felt like a heart attack      Metoprolol Other (See Comments)     chest pain    Morphine Other (See Comments)     Other reaction(s):  Other (See Comments)  Hallucinations  Hallucinations  Lights me up    Morphine And Related Other (See jaunduice. PERRL. · Neck: Neck supple. No rigidity. No JVD present. · Cardiovascular: Normal rate, regular rhythm, S1&S2. · Pulmonary/Chest: Bilateral respiratory sounds. No wheezes, No rhonchi. · Abdominal: Soft. Bowel sounds present. No distension, No tenderness. · Musculoskeletal: No tenderness. No edema    · Lymphadenopathy: Has no cervical adenopathy. · Neurological: Alert and oriented. Cranial nerve appears intact, No Gross deficit   · Skin: Skin is warm and dry. No rash noted. · Psychiatric: Has a normal mood, affect and behavior     Labs:  Reviewed. No results for input(s): NA, K, CL, CO2, PHOS, BUN, CREATININE in the last 72 hours. Invalid input(s): CA,  TSH  No results for input(s): WBC, HGB, HCT, MCV, PLT in the last 72 hours. Lab Results   Component Value Date    TROPONINI <0.01 03/11/2019     Lab Results   Component Value Date    BNP 26.0 03/19/2018    BNP 31 10/17/2012    BNP <15.0 10/16/2012     Lab Results   Component Value Date    PROTIME 11.6 06/11/2019    PROTIME 11.7 04/18/2019    PROTIME 12.6 03/11/2019    INR 1.02 06/11/2019    INR 1.03 04/18/2019    INR 1.11 03/11/2019     Lab Results   Component Value Date    CHOL 175 03/12/2019    HDL 29 03/12/2019    TRIG 131 03/12/2019       ECG: Personally reviewed: NSR, HR 63, , QRS 98, QTc 357    ECHO: 3/27/19  Normal LV systolic function with EF of 55-60%.   No regional wall motion abnormalites are seen.   Grade I diastolic dysfunction with normal filling pressure.   Mild mitral regurgitation.   No TR jet to estimate systolic pulmonary artery pressure (SPAP).  Probably   normal.    Stress Test: 5/1/19  Summary    Overall findings represent a low risk scan.    There is normal isotope uptake at stress and rest. There is no evidence of    myocardial ischemia or scar.    Normal LV size and systolic function.    Normal myocardial perfusion study.    Non-diagnostic EKG response due to failure to reach target heart rate.    ECG:

## 2019-08-14 ENCOUNTER — APPOINTMENT (OUTPATIENT)
Dept: CARDIAC REHAB | Age: 64
End: 2019-08-14
Payer: MEDICARE

## 2019-08-14 ENCOUNTER — HOSPITAL ENCOUNTER (OUTPATIENT)
Dept: CARDIAC REHAB | Age: 64
Setting detail: THERAPIES SERIES
Discharge: HOME | End: 2019-08-14
Payer: MEDICARE

## 2019-08-14 PROCEDURE — 93798 PHYS/QHP OP CAR RHAB W/ECG: CPT

## 2019-08-16 ENCOUNTER — HOSPITAL ENCOUNTER (OUTPATIENT)
Dept: CARDIAC REHAB | Age: 64
Setting detail: THERAPIES SERIES
Discharge: HOME | End: 2019-08-16
Payer: MEDICARE

## 2019-08-16 ENCOUNTER — APPOINTMENT (OUTPATIENT)
Dept: CARDIAC REHAB | Age: 64
End: 2019-08-16
Payer: MEDICARE

## 2019-08-16 PROCEDURE — 93798 PHYS/QHP OP CAR RHAB W/ECG: CPT

## 2019-08-19 ENCOUNTER — HOSPITAL ENCOUNTER (OUTPATIENT)
Dept: CARDIAC REHAB | Age: 64
Setting detail: THERAPIES SERIES
Discharge: HOME | End: 2019-08-19
Payer: MEDICARE

## 2019-08-19 ENCOUNTER — APPOINTMENT (OUTPATIENT)
Dept: CARDIAC REHAB | Age: 64
End: 2019-08-19
Payer: MEDICARE

## 2019-08-19 PROCEDURE — 93798 PHYS/QHP OP CAR RHAB W/ECG: CPT

## 2019-08-21 ENCOUNTER — HOSPITAL ENCOUNTER (OUTPATIENT)
Dept: CARDIAC REHAB | Age: 64
Setting detail: THERAPIES SERIES
Discharge: HOME | End: 2019-08-21
Payer: MEDICARE

## 2019-08-21 ENCOUNTER — APPOINTMENT (OUTPATIENT)
Dept: CARDIAC REHAB | Age: 64
End: 2019-08-21
Payer: MEDICARE

## 2019-08-21 PROCEDURE — 93798 PHYS/QHP OP CAR RHAB W/ECG: CPT

## 2019-08-22 ENCOUNTER — OFFICE VISIT (OUTPATIENT)
Dept: CARDIOLOGY CLINIC | Age: 64
End: 2019-08-22
Payer: MEDICARE

## 2019-08-22 ENCOUNTER — PROCEDURE VISIT (OUTPATIENT)
Dept: CARDIOLOGY CLINIC | Age: 64
End: 2019-08-22
Payer: MEDICARE

## 2019-08-22 VITALS
DIASTOLIC BLOOD PRESSURE: 76 MMHG | WEIGHT: 197.6 LBS | SYSTOLIC BLOOD PRESSURE: 114 MMHG | BODY MASS INDEX: 31.01 KG/M2 | HEIGHT: 67 IN | HEART RATE: 62 BPM

## 2019-08-22 DIAGNOSIS — R06.02 SOB (SHORTNESS OF BREATH): ICD-10-CM

## 2019-08-22 DIAGNOSIS — G47.33 OSA (OBSTRUCTIVE SLEEP APNEA): ICD-10-CM

## 2019-08-22 DIAGNOSIS — Z45.09 ENCOUNTER FOR ELECTRONIC ANALYSIS OF REVEAL EVENT RECORDER: ICD-10-CM

## 2019-08-22 DIAGNOSIS — R00.2 HEART PALPITATIONS: ICD-10-CM

## 2019-08-22 DIAGNOSIS — R00.0 INAPPROPRIATE SINUS TACHYCARDIA: Primary | ICD-10-CM

## 2019-08-22 DIAGNOSIS — I25.118 CORONARY ARTERY DISEASE OF NATIVE ARTERY OF NATIVE HEART WITH STABLE ANGINA PECTORIS (HCC): ICD-10-CM

## 2019-08-22 PROCEDURE — 99215 OFFICE O/P EST HI 40 MIN: CPT | Performed by: INTERNAL MEDICINE

## 2019-08-22 PROCEDURE — 93291 INTERROG DEV EVAL SCRMS IP: CPT | Performed by: INTERNAL MEDICINE

## 2019-08-22 PROCEDURE — 93000 ELECTROCARDIOGRAM COMPLETE: CPT | Performed by: INTERNAL MEDICINE

## 2019-08-22 NOTE — LETTER
[facility]  [facility] Procedure [facility]  8/22/19    Quishmael Stakes  1955    Physician: Dr. Matt Cerda    EP Procedures   Pacemaker implant  EP Study    ICD implant  Atrial flutter ablation     Biv implant  Atrial fibrillation ablation    Generator Change  SVT ablation    Lead revision  VT ablation    Lead extraction +/- upgrade  AVN ablation    Loop implant  Cardioversion    x Other:  ILR removal  CHETNA     Equipment  x Medtronic   ESTHER Mapping System    [place]  [address] [address]  CryoAblation    Biotronik  Laser Lead Extraction    Special Equipment       EP Procedures Scheduling Request  Time Requested  1300   Specific Day 9/17/19   Anesthesia    CT surgery backup    Location [facility]     Pre-Procedure Labs / Imaging   PT/INR  Type & cross    CBC  Units PRBC    BMP/Mg  Units FFP    Venogram  CXR    Echo  Pulmonary CTA for Pulmonary vein mapping     Patient Instructions      Date of last admission: >30 days

## 2019-08-23 ENCOUNTER — HOSPITAL ENCOUNTER (OUTPATIENT)
Dept: CARDIAC REHAB | Age: 64
Setting detail: THERAPIES SERIES
Discharge: HOME | End: 2019-08-23
Payer: MEDICARE

## 2019-08-23 ENCOUNTER — APPOINTMENT (OUTPATIENT)
Dept: CARDIAC REHAB | Age: 64
End: 2019-08-23
Payer: MEDICARE

## 2019-08-23 PROCEDURE — 93798 PHYS/QHP OP CAR RHAB W/ECG: CPT

## 2019-08-26 ENCOUNTER — APPOINTMENT (OUTPATIENT)
Dept: CARDIAC REHAB | Age: 64
End: 2019-08-26
Payer: MEDICARE

## 2019-08-26 ENCOUNTER — HOSPITAL ENCOUNTER (OUTPATIENT)
Dept: CARDIAC REHAB | Age: 64
Setting detail: THERAPIES SERIES
Discharge: HOME | End: 2019-08-26
Payer: MEDICARE

## 2019-08-26 PROCEDURE — 93798 PHYS/QHP OP CAR RHAB W/ECG: CPT

## 2019-08-28 ENCOUNTER — PREP FOR PROCEDURE (OUTPATIENT)
Dept: CARDIOLOGY CLINIC | Age: 64
End: 2019-08-28

## 2019-08-28 ENCOUNTER — APPOINTMENT (OUTPATIENT)
Dept: CARDIAC REHAB | Age: 64
End: 2019-08-28
Payer: MEDICARE

## 2019-08-28 RX ORDER — SODIUM CHLORIDE 0.9 % (FLUSH) 0.9 %
10 SYRINGE (ML) INJECTION EVERY 12 HOURS SCHEDULED
Status: CANCELLED | OUTPATIENT
Start: 2019-08-28

## 2019-08-28 RX ORDER — SODIUM CHLORIDE 9 MG/ML
INJECTION, SOLUTION INTRAVENOUS CONTINUOUS
Status: CANCELLED | OUTPATIENT
Start: 2019-08-28

## 2019-08-28 RX ORDER — SODIUM CHLORIDE 0.9 % (FLUSH) 0.9 %
10 SYRINGE (ML) INJECTION PRN
Status: CANCELLED | OUTPATIENT
Start: 2019-08-28

## 2019-08-30 ENCOUNTER — APPOINTMENT (OUTPATIENT)
Dept: CARDIAC REHAB | Age: 64
End: 2019-08-30
Payer: MEDICARE

## 2019-09-04 ENCOUNTER — APPOINTMENT (OUTPATIENT)
Dept: CARDIAC REHAB | Age: 64
End: 2019-09-04
Payer: MEDICARE

## 2019-09-06 ENCOUNTER — APPOINTMENT (OUTPATIENT)
Dept: CARDIAC REHAB | Age: 64
End: 2019-09-06
Payer: MEDICARE

## 2019-09-09 ENCOUNTER — APPOINTMENT (OUTPATIENT)
Dept: CARDIAC REHAB | Age: 64
End: 2019-09-09
Payer: MEDICARE

## 2019-09-11 ENCOUNTER — APPOINTMENT (OUTPATIENT)
Dept: CARDIAC REHAB | Age: 64
End: 2019-09-11
Payer: MEDICARE

## 2019-09-13 ENCOUNTER — APPOINTMENT (OUTPATIENT)
Dept: CARDIAC REHAB | Age: 64
End: 2019-09-13
Payer: MEDICARE

## 2019-09-13 ENCOUNTER — HOSPITAL ENCOUNTER (OUTPATIENT)
Dept: CARDIAC REHAB | Age: 64
Setting detail: THERAPIES SERIES
Discharge: HOME | End: 2019-09-13
Payer: MEDICARE

## 2019-09-13 PROCEDURE — 93798 PHYS/QHP OP CAR RHAB W/ECG: CPT

## 2019-09-16 ENCOUNTER — HOSPITAL ENCOUNTER (OUTPATIENT)
Dept: CARDIAC REHAB | Age: 64
Setting detail: THERAPIES SERIES
Discharge: HOME | End: 2019-09-16
Payer: MEDICARE

## 2019-09-16 ENCOUNTER — APPOINTMENT (OUTPATIENT)
Dept: CARDIAC REHAB | Age: 64
End: 2019-09-16
Payer: MEDICARE

## 2019-09-16 PROCEDURE — 93798 PHYS/QHP OP CAR RHAB W/ECG: CPT

## 2019-09-17 PROBLEM — I44.39 HIGH DEGREE ATRIOVENTRICULAR BLOCK: Status: ACTIVE | Noted: 2019-09-17

## 2019-09-18 ENCOUNTER — APPOINTMENT (OUTPATIENT)
Dept: CARDIAC REHAB | Age: 64
End: 2019-09-18
Payer: MEDICARE

## 2019-09-18 ENCOUNTER — HOSPITAL ENCOUNTER (OUTPATIENT)
Dept: CARDIAC REHAB | Age: 64
Setting detail: THERAPIES SERIES
Discharge: HOME | End: 2019-09-18
Payer: MEDICARE

## 2019-09-18 ENCOUNTER — TELEPHONE (OUTPATIENT)
Dept: CARDIOLOGY CLINIC | Age: 64
End: 2019-09-18

## 2019-09-18 PROCEDURE — 93798 PHYS/QHP OP CAR RHAB W/ECG: CPT

## 2019-09-18 PROCEDURE — 94640 AIRWAY INHALATION TREATMENT: CPT

## 2019-09-19 ENCOUNTER — APPOINTMENT (OUTPATIENT)
Dept: GENERAL RADIOLOGY | Age: 64
End: 2019-09-19
Attending: INTERNAL MEDICINE
Payer: MEDICARE

## 2019-09-19 PROBLEM — Z95.0 PACEMAKER: Status: ACTIVE | Noted: 2019-09-19

## 2019-09-19 PROCEDURE — 71046 X-RAY EXAM CHEST 2 VIEWS: CPT

## 2019-09-20 ENCOUNTER — APPOINTMENT (OUTPATIENT)
Dept: CARDIAC REHAB | Age: 64
End: 2019-09-20
Payer: MEDICARE

## 2019-09-23 ENCOUNTER — APPOINTMENT (OUTPATIENT)
Dept: CARDIAC REHAB | Age: 64
End: 2019-09-23
Payer: MEDICARE

## 2019-09-23 NOTE — PROGRESS NOTES
pressure (SPAP). Probably   normal.    Stress Test: 5/1/19  Summary    Overall findings represent a low risk scan.    There is normal isotope uptake at stress and rest. There is no evidence of    myocardial ischemia or scar.    Normal LV size and systolic function.    Normal myocardial perfusion study.    Non-diagnostic EKG response due to failure to reach target heart rate.    ECG: Non-diagnostic EKG response due to failure to reach target heart rate. Cardiac Angiography: 6/20/19  Angiographic Findings:  Right dominant system. Diffuse calcified plaque  Left Main:  Minor irregularities.   Left Anterior Descending:  Ostial/proximal 40% stenosis. Mid diffuse irregular plaque; no stenosis greater than 30%. Lavjaye Mcgowans has an ostial 70% stenosis and mid 80% stenosis; this is the only significant diagonal branch.   Ramus Intermedius: Small.   Circumflex:  Ostial 30% stenosis. Large proximal vessel with small AV circumflex branch. One very large OM with no significant disease.    Right Coronary:  Large vessel. Normal. Some catheter spasm noted that improved with withdrawing the catheter.   Left Ventriculogram:  LVEF 60-65%   Hemodynamics (mm Hg):  Left Ventricular Pressure:  140/8, 15  Central Aortic Pressure:  136/77 (103)   Conclusions:  -Diffuse CAD. Only major obstructive disease in a diagonal branch. -Mildly elevated LVEDP  -Normal LVEF   Recommendations:  -Maximize medical therapy.                  Problem List:   Patient Active Problem List    Diagnosis Date Noted    Dyspnea on exertion 03/20/2018     Priority: High    Pacemaker 09/19/2019    High degree atrioventricular block 09/17/2019    Syncope     S/P coronary angiogram     Coronary artery disease of native artery of native heart with stable angina pectoris ([address]     Tachycardia     Encounter for electronic analysis of reveal event recorder 04/18/2019    Simple chronic bronchitis ([address] 04/05/2019    Restrictive lung disease 04/05/2019    Observed showing a pause up to 4 seconds. S/p ILR removal and dual chamber PPM placement (9/18/2019, Dr. Jackie Gallo). Event monitor (9/14/2018-9/20/2018) showed NSR, SB with a heart rate between .    24-hour Holter  4/2/2019 showed a min HR 48, average 80, max 150, NSR, 4 beats  NSAT, no other arrhythmias noted. Palpitations/tachycardia/sinus pause  - S/p ILR and removal  - Sinus pause seen on ILR   - S/p dual chamber MDT PPM  - Device check today shows 36% AP, less than 0.1% , no arrhythmias, other parameters stable. - On metoprolol 25 mg twice daily-tolerating  - Off Corlanor  - F/u with NP in 1 month  - ECG ordered and results personally reviewed     Chest pain/SOB/CAD  -Did not tolerate Imdur  -Off ivabradine, will start Ranexa 500 mg twice daily  - F/u with Dr. Mckay Llanos sooner than scheduled appointment due to ongoing symptoms of chest discomfort which do not appear to be EP related  - In cardiac rehab at [facility] - would continue    EF of 70-43%  No systolic HF  ASA and Statin for CAD  No known AF     Total time of visit was 45 minutes. Greater than 50% of visit was spent on coordination of care and counseling the patient and family on treatment options of tachycardia, palps and CP and the side effects of medications. Tobacco use was discussed with the patient and education provided. All questions and concerns were addressed to the patient/family. Alternatives to my treatment were discussed. The note was completed using EMR. Every effort was made to ensure accuracy; however, inadvertent computerized transcription errors may be present. Patient received education regarding their diagnosis, treatment and medications while in the office today.       Alex Haney [address]

## 2019-09-24 ENCOUNTER — OFFICE VISIT (OUTPATIENT)
Dept: CARDIOLOGY CLINIC | Age: 64
End: 2019-09-24
Payer: MEDICARE

## 2019-09-24 ENCOUNTER — TELEPHONE (OUTPATIENT)
Dept: CARDIOLOGY CLINIC | Age: 64
End: 2019-09-24

## 2019-09-24 ENCOUNTER — NURSE ONLY (OUTPATIENT)
Dept: CARDIOLOGY CLINIC | Age: 64
End: 2019-09-24
Payer: MEDICARE

## 2019-09-24 VITALS
WEIGHT: 196.2 LBS | HEIGHT: 67 IN | DIASTOLIC BLOOD PRESSURE: 82 MMHG | HEART RATE: 78 BPM | BODY MASS INDEX: 30.79 KG/M2 | SYSTOLIC BLOOD PRESSURE: 138 MMHG

## 2019-09-24 DIAGNOSIS — R07.9 CHEST PAIN, UNSPECIFIED TYPE: ICD-10-CM

## 2019-09-24 DIAGNOSIS — Z95.0 PACEMAKER: ICD-10-CM

## 2019-09-24 DIAGNOSIS — R55 NEAR SYNCOPE: ICD-10-CM

## 2019-09-24 DIAGNOSIS — Z95.0 PACEMAKER: Primary | ICD-10-CM

## 2019-09-24 DIAGNOSIS — I49.5 SICK SINUS SYNDROME (HCC): ICD-10-CM

## 2019-09-24 DIAGNOSIS — I45.5 SINUS PAUSE: ICD-10-CM

## 2019-09-24 DIAGNOSIS — R00.0 INAPPROPRIATE SINUS TACHYCARDIA: Primary | ICD-10-CM

## 2019-09-24 DIAGNOSIS — R06.02 SHORTNESS OF BREATH: ICD-10-CM

## 2019-09-24 PROCEDURE — 93000 ELECTROCARDIOGRAM COMPLETE: CPT | Performed by: NURSE PRACTITIONER

## 2019-09-24 PROCEDURE — 93280 PM DEVICE PROGR EVAL DUAL: CPT | Performed by: INTERNAL MEDICINE

## 2019-09-24 PROCEDURE — 4040F PNEUMOC VAC/ADMIN/RCVD: CPT | Performed by: NURSE PRACTITIONER

## 2019-09-24 PROCEDURE — 3017F COLORECTAL CA SCREEN DOC REV: CPT | Performed by: NURSE PRACTITIONER

## 2019-09-24 PROCEDURE — G8598 ASA/ANTIPLAT THER USED: HCPCS | Performed by: NURSE PRACTITIONER

## 2019-09-24 PROCEDURE — 1123F ACP DISCUSS/DSCN MKR DOCD: CPT | Performed by: NURSE PRACTITIONER

## 2019-09-24 PROCEDURE — G8417 CALC BMI ABV UP PARAM F/U: HCPCS | Performed by: NURSE PRACTITIONER

## 2019-09-24 PROCEDURE — G8427 DOCREV CUR MEDS BY ELIG CLIN: HCPCS | Performed by: NURSE PRACTITIONER

## 2019-09-24 PROCEDURE — 99214 OFFICE O/P EST MOD 30 MIN: CPT | Performed by: NURSE PRACTITIONER

## 2019-09-24 PROCEDURE — 4004F PT TOBACCO SCREEN RCVD TLK: CPT | Performed by: NURSE PRACTITIONER

## 2019-09-25 ENCOUNTER — APPOINTMENT (OUTPATIENT)
Dept: CARDIAC REHAB | Age: 64
End: 2019-09-25
Payer: MEDICARE

## 2019-09-25 ENCOUNTER — TELEPHONE (OUTPATIENT)
Dept: CARDIOLOGY CLINIC | Age: 64
End: 2019-09-25

## 2019-09-25 ENCOUNTER — HOSPITAL ENCOUNTER (OUTPATIENT)
Dept: CARDIAC REHAB | Age: 64
Setting detail: THERAPIES SERIES
Discharge: HOME | End: 2019-09-25
Payer: MEDICARE

## 2019-09-25 PROCEDURE — 93798 PHYS/QHP OP CAR RHAB W/ECG: CPT

## 2019-09-25 NOTE — TELEPHONE ENCOUNTER
Called and spoke with Lex Garcia. Advised him that Liza agreed with Dr. Rufina Goldman. Told him to call the office on Monday to let us know how he was feeling and if it was helping with his chest pains. He verbalized understanding.

## 2019-09-26 ENCOUNTER — OFFICE VISIT (OUTPATIENT)
Dept: PULMONOLOGY | Age: 64
End: 2019-09-26
Payer: MEDICARE

## 2019-09-26 VITALS
WEIGHT: 197 LBS | HEIGHT: 67 IN | SYSTOLIC BLOOD PRESSURE: 122 MMHG | DIASTOLIC BLOOD PRESSURE: 70 MMHG | HEART RATE: 71 BPM | OXYGEN SATURATION: 97 % | BODY MASS INDEX: 30.92 KG/M2 | RESPIRATION RATE: 16 BRPM

## 2019-09-26 DIAGNOSIS — I20.8 STABLE ANGINA PECTORIS (HCC): ICD-10-CM

## 2019-09-26 DIAGNOSIS — J41.0 SIMPLE CHRONIC BRONCHITIS (HCC): Primary | ICD-10-CM

## 2019-09-26 DIAGNOSIS — Z72.0 TOBACCO ABUSE DISORDER: ICD-10-CM

## 2019-09-26 PROCEDURE — 3017F COLORECTAL CA SCREEN DOC REV: CPT | Performed by: INTERNAL MEDICINE

## 2019-09-26 PROCEDURE — 1123F ACP DISCUSS/DSCN MKR DOCD: CPT | Performed by: INTERNAL MEDICINE

## 2019-09-26 PROCEDURE — 4040F PNEUMOC VAC/ADMIN/RCVD: CPT | Performed by: INTERNAL MEDICINE

## 2019-09-26 PROCEDURE — G8926 SPIRO NO PERF OR DOC: HCPCS | Performed by: INTERNAL MEDICINE

## 2019-09-26 PROCEDURE — G8427 DOCREV CUR MEDS BY ELIG CLIN: HCPCS | Performed by: INTERNAL MEDICINE

## 2019-09-26 PROCEDURE — G8598 ASA/ANTIPLAT THER USED: HCPCS | Performed by: INTERNAL MEDICINE

## 2019-09-26 PROCEDURE — 4004F PT TOBACCO SCREEN RCVD TLK: CPT | Performed by: INTERNAL MEDICINE

## 2019-09-26 PROCEDURE — 3023F SPIROM DOC REV: CPT | Performed by: INTERNAL MEDICINE

## 2019-09-26 PROCEDURE — 99205 OFFICE O/P NEW HI 60 MIN: CPT | Performed by: INTERNAL MEDICINE

## 2019-09-26 PROCEDURE — G8417 CALC BMI ABV UP PARAM F/U: HCPCS | Performed by: INTERNAL MEDICINE

## 2019-09-26 NOTE — PROGRESS NOTES
impairment. 3.  Normal diffusion capacity. 4.  Clinical correlation recommended, please note that lack of  bronchodilator response does not preclude the clinical use of  bronchodilators.       Immunizations:   Immunization History   Administered Date(s) Administered    Tdap (Boostrix, Adacel) 08/17/2016       Assessment: This is a 75 y.o. male with mixed obstructive and restrictive lung disease, chest pain and tobacco abuse disorder. Plan:   -Chest pain: I went through the differential for patient's left-sided chest pain. From a lung perspective pleurisy or pneumothorax would be the thing to cause chest pain but he has no evidence of that the pain is not clinically consistent with those diagnoses. Angina either from coronary disease or Prinzmetal angina from coronary vasospasm would make the most sense and he is actively getting treated for that. He says he was just started on Robaxin so has not had enough time to know if it can help. Alternatively esophageal spasm either because of her independent of gastroesophageal reflux disease could cause his pain. The nitroglycerin could help either the esophageal spasm or angina if they are present, so I agree with that treatment plan. I do not believe that his pain is secondary to pulmonary process. Abnormal PFTs: Patient's pulmonary function test numbers suggest a restrictive defect over obstructive however the flow volume loops clearly look obstructed so I agree with the interpretation above. He is on a rescue inhaler which she says does nothing. I think his other symptoms are cardiac related so I do not think pushing additional inhaled medications would be particularly helpful. But he really needs to do is quit smoking. Tobacco abuse disorder: Most of patient's current medical problems are directly related to smoking. He is contemplative quitting but does not really want to.   He has patches but is under the impression that he will have a heart

## 2019-09-27 ENCOUNTER — APPOINTMENT (OUTPATIENT)
Dept: CARDIAC REHAB | Age: 64
End: 2019-09-27
Payer: MEDICARE

## 2019-09-27 ENCOUNTER — HOSPITAL ENCOUNTER (OUTPATIENT)
Dept: CARDIAC REHAB | Age: 64
Setting detail: THERAPIES SERIES
Discharge: HOME | End: 2019-09-27
Payer: MEDICARE

## 2019-09-27 PROCEDURE — 93798 PHYS/QHP OP CAR RHAB W/ECG: CPT

## 2019-09-30 ENCOUNTER — APPOINTMENT (OUTPATIENT)
Dept: CARDIAC REHAB | Age: 64
End: 2019-09-30
Payer: MEDICARE

## 2019-09-30 ENCOUNTER — HOSPITAL ENCOUNTER (OUTPATIENT)
Dept: CARDIAC REHAB | Age: 64
Setting detail: THERAPIES SERIES
Discharge: HOME | End: 2019-09-30
Payer: MEDICARE

## 2019-09-30 PROCEDURE — 93798 PHYS/QHP OP CAR RHAB W/ECG: CPT

## 2019-09-30 RX ORDER — RANOLAZINE 1000 MG/1
1000 TABLET, EXTENDED RELEASE ORAL 2 TIMES DAILY
Qty: 60 TABLET | Refills: 5 | Status: SHIPPED | OUTPATIENT
Start: 2019-09-30

## 2019-09-30 NOTE — TELEPHONE ENCOUNTER
Susan English called to let me know how the Ranexa was doing and how he was feeling. He took one 1000 mg pill on Friday and one on Saturday. Sunday then took two and has taken one so far this morning. Susan English states he is having more of a pressure than pain now. He stated it was so bad yesterday he had to pull over and let his wife drive home from [place]. He stated it \"felt like someone had a belt around his chest.\"  \"Feels like someone has given me a hand full of sleeping pills. \"  He did take a nitro and it relieved the tightness. He had had another episode jsut liek that one on Friday evening. That being said he had the best workout he has had in Cardiac rehab since he started today. He was able to do 20 minutes on a bike and 20 mins on ski machine. Didn't have the sharp pain like he has been having. Next week is his last week at Cardiac Rehab. He would like to know if you want him to continue the Ranexa 1000 mg bid. Also, would lik eto know when he should go to the ER. Please advise.   Thanks

## 2019-10-01 ENCOUNTER — APPOINTMENT (OUTPATIENT)
Dept: GENERAL RADIOLOGY | Age: 64
End: 2019-10-01
Payer: MEDICARE

## 2019-10-01 ENCOUNTER — HOSPITAL ENCOUNTER (EMERGENCY)
Age: 64
Discharge: HOME | End: 2019-10-01
Attending: EMERGENCY MEDICINE
Payer: MEDICARE

## 2019-10-01 VITALS
SYSTOLIC BLOOD PRESSURE: 123 MMHG | OXYGEN SATURATION: 96 % | BODY MASS INDEX: 30.61 KG/M2 | WEIGHT: 195 LBS | TEMPERATURE: 98.4 F | RESPIRATION RATE: 16 BRPM | HEIGHT: 67 IN | HEART RATE: 60 BPM | DIASTOLIC BLOOD PRESSURE: 74 MMHG

## 2019-10-01 DIAGNOSIS — R07.89 CHEST WALL PAIN: Primary | ICD-10-CM

## 2019-10-01 LAB
ANION GAP SERPL CALCULATED.3IONS-SCNC: 14 MMOL/L (ref 3–16)
BASOPHILS ABSOLUTE: 0.1 K/UL (ref 0–0.2)
BASOPHILS RELATIVE PERCENT: 1 %
BUN BLDV-MCNC: 16 MG/DL (ref 7–20)
CALCIUM SERPL-MCNC: 9 MG/DL (ref 8.3–10.6)
CHLORIDE BLD-SCNC: 103 MMOL/L (ref 99–110)
CO2: 24 MMOL/L (ref 21–32)
CREAT SERPL-MCNC: 0.8 MG/DL (ref 0.8–1.3)
EKG ATRIAL RATE: 78 BPM
EKG DIAGNOSIS: NORMAL
EKG P AXIS: 58 DEGREES
EKG P-R INTERVAL: 140 MS
EKG Q-T INTERVAL: 366 MS
EKG QRS DURATION: 86 MS
EKG QTC CALCULATION (BAZETT): 417 MS
EKG R AXIS: 33 DEGREES
EKG T AXIS: 4 DEGREES
EKG VENTRICULAR RATE: 78 BPM
EOSINOPHILS ABSOLUTE: 0.5 K/UL (ref 0–0.6)
EOSINOPHILS RELATIVE PERCENT: 4.8 %
GFR AFRICAN AMERICAN: >60
GFR NON-AFRICAN AMERICAN: >60
GLUCOSE BLD-MCNC: 112 MG/DL (ref 70–99)
HCT VFR BLD CALC: 46.4 % (ref 40.5–52.5)
HEMOGLOBIN: 15.8 G/DL (ref 13.5–17.5)
LYMPHOCYTES ABSOLUTE: 3.5 K/UL (ref 1–5.1)
LYMPHOCYTES RELATIVE PERCENT: 37.4 %
MCH RBC QN AUTO: 32.8 PG (ref 26–34)
MCHC RBC AUTO-ENTMCNC: 34.2 G/DL (ref 31–36)
MCV RBC AUTO: 95.9 FL (ref 80–100)
MONOCYTES ABSOLUTE: 0.7 K/UL (ref 0–1.3)
MONOCYTES RELATIVE PERCENT: 7.3 %
NEUTROPHILS ABSOLUTE: 4.7 K/UL (ref 1.7–7.7)
NEUTROPHILS RELATIVE PERCENT: 49.5 %
PDW BLD-RTO: 13 % (ref 12.4–15.4)
PLATELET # BLD: 235 K/UL (ref 135–450)
PMV BLD AUTO: 8.9 FL (ref 5–10.5)
POTASSIUM REFLEX MAGNESIUM: 4.3 MMOL/L (ref 3.5–5.1)
RBC # BLD: 4.84 M/UL (ref 4.2–5.9)
SODIUM BLD-SCNC: 141 MMOL/L (ref 136–145)
TROPONIN: <0.01 NG/ML
WBC # BLD: 9.5 K/UL (ref 4–11)

## 2019-10-01 PROCEDURE — 80048 BASIC METABOLIC PNL TOTAL CA: CPT

## 2019-10-01 PROCEDURE — 84484 ASSAY OF TROPONIN QUANT: CPT

## 2019-10-01 PROCEDURE — 99285 EMERGENCY DEPT VISIT HI MDM: CPT

## 2019-10-01 PROCEDURE — 85025 COMPLETE CBC W/AUTO DIFF WBC: CPT

## 2019-10-01 PROCEDURE — 93005 ELECTROCARDIOGRAM TRACING: CPT | Performed by: EMERGENCY MEDICINE

## 2019-10-01 PROCEDURE — 71046 X-RAY EXAM CHEST 2 VIEWS: CPT

## 2019-10-01 RX ORDER — ASPIRIN 81 MG/1
81 TABLET, CHEWABLE ORAL DAILY
Status: DISCONTINUED | OUTPATIENT
Start: 2019-10-02 | End: 2019-10-01

## 2019-10-01 RX ORDER — PANTOPRAZOLE SODIUM 40 MG/10ML
40 INJECTION, POWDER, LYOPHILIZED, FOR SOLUTION INTRAVENOUS ONCE
Status: DISCONTINUED | OUTPATIENT
Start: 2019-10-01 | End: 2019-10-02 | Stop reason: HOSPADM

## 2019-10-01 RX ORDER — ASPIRIN 81 MG/1
324 TABLET, CHEWABLE ORAL ONCE
Status: DISCONTINUED | OUTPATIENT
Start: 2019-10-01 | End: 2019-10-02 | Stop reason: HOSPADM

## 2019-10-01 ASSESSMENT — ENCOUNTER SYMPTOMS
COUGH: 1
ABDOMINAL PAIN: 0
CHOKING: 1
CHEST TIGHTNESS: 1
SHORTNESS OF BREATH: 1
NAUSEA: 1
VOMITING: 0
TROUBLE SWALLOWING: 0
HEARTBURN: 1

## 2019-10-01 ASSESSMENT — PAIN DESCRIPTION - DESCRIPTORS: DESCRIPTORS: BURNING

## 2019-10-01 ASSESSMENT — PAIN DESCRIPTION - FREQUENCY: FREQUENCY: CONTINUOUS

## 2019-10-01 ASSESSMENT — PAIN DESCRIPTION - LOCATION: LOCATION: CHEST

## 2019-10-01 ASSESSMENT — PAIN DESCRIPTION - PAIN TYPE: TYPE: ACUTE PAIN

## 2019-10-01 ASSESSMENT — PAIN SCALES - GENERAL: PAINLEVEL_OUTOF10: 7

## 2019-10-02 ENCOUNTER — APPOINTMENT (OUTPATIENT)
Dept: CARDIAC REHAB | Age: 64
End: 2019-10-02
Payer: MEDICARE

## 2019-10-02 ENCOUNTER — TELEPHONE (OUTPATIENT)
Dept: CARDIOLOGY CLINIC | Age: 64
End: 2019-10-02

## 2019-10-03 ENCOUNTER — OFFICE VISIT (OUTPATIENT)
Dept: CARDIOLOGY CLINIC | Age: 64
End: 2019-10-03
Payer: MEDICARE

## 2019-10-03 ENCOUNTER — NURSE ONLY (OUTPATIENT)
Dept: CARDIOLOGY CLINIC | Age: 64
End: 2019-10-03
Payer: MEDICARE

## 2019-10-03 ENCOUNTER — PROCEDURE VISIT (OUTPATIENT)
Dept: CARDIOLOGY CLINIC | Age: 64
End: 2019-10-03

## 2019-10-03 VITALS
BODY MASS INDEX: 30.64 KG/M2 | HEIGHT: 67 IN | DIASTOLIC BLOOD PRESSURE: 90 MMHG | HEART RATE: 60 BPM | WEIGHT: 195.2 LBS | SYSTOLIC BLOOD PRESSURE: 132 MMHG

## 2019-10-03 DIAGNOSIS — R00.0 INAPPROPRIATE SINUS TACHYCARDIA: ICD-10-CM

## 2019-10-03 DIAGNOSIS — I49.5 SICK SINUS SYNDROME (HCC): ICD-10-CM

## 2019-10-03 DIAGNOSIS — Z95.0 PACEMAKER: ICD-10-CM

## 2019-10-03 DIAGNOSIS — R07.9 CHEST PAIN, UNSPECIFIED TYPE: ICD-10-CM

## 2019-10-03 DIAGNOSIS — I45.5 SINUS PAUSE: Primary | ICD-10-CM

## 2019-10-03 PROCEDURE — 1123F ACP DISCUSS/DSCN MKR DOCD: CPT | Performed by: NURSE PRACTITIONER

## 2019-10-03 PROCEDURE — 4040F PNEUMOC VAC/ADMIN/RCVD: CPT | Performed by: NURSE PRACTITIONER

## 2019-10-03 PROCEDURE — G8417 CALC BMI ABV UP PARAM F/U: HCPCS | Performed by: NURSE PRACTITIONER

## 2019-10-03 PROCEDURE — G8484 FLU IMMUNIZE NO ADMIN: HCPCS | Performed by: NURSE PRACTITIONER

## 2019-10-03 PROCEDURE — G8598 ASA/ANTIPLAT THER USED: HCPCS | Performed by: NURSE PRACTITIONER

## 2019-10-03 PROCEDURE — 4004F PT TOBACCO SCREEN RCVD TLK: CPT | Performed by: NURSE PRACTITIONER

## 2019-10-03 PROCEDURE — 93000 ELECTROCARDIOGRAM COMPLETE: CPT | Performed by: NURSE PRACTITIONER

## 2019-10-03 PROCEDURE — G8428 CUR MEDS NOT DOCUMENT: HCPCS | Performed by: NURSE PRACTITIONER

## 2019-10-03 PROCEDURE — 3017F COLORECTAL CA SCREEN DOC REV: CPT | Performed by: NURSE PRACTITIONER

## 2019-10-03 PROCEDURE — 93280 PM DEVICE PROGR EVAL DUAL: CPT | Performed by: INTERNAL MEDICINE

## 2019-10-03 PROCEDURE — 99213 OFFICE O/P EST LOW 20 MIN: CPT | Performed by: NURSE PRACTITIONER

## 2019-10-04 ENCOUNTER — APPOINTMENT (OUTPATIENT)
Dept: CARDIAC REHAB | Age: 64
End: 2019-10-04
Payer: MEDICARE

## 2019-10-04 ENCOUNTER — HOSPITAL ENCOUNTER (OUTPATIENT)
Dept: CARDIAC REHAB | Age: 64
Setting detail: THERAPIES SERIES
Discharge: HOME | End: 2019-10-04
Payer: MEDICARE

## 2019-10-04 PROCEDURE — 93798 PHYS/QHP OP CAR RHAB W/ECG: CPT

## 2019-10-07 ENCOUNTER — APPOINTMENT (OUTPATIENT)
Dept: CARDIAC REHAB | Age: 64
End: 2019-10-07
Payer: MEDICARE

## 2019-10-07 ENCOUNTER — HOSPITAL ENCOUNTER (OUTPATIENT)
Dept: CARDIAC REHAB | Age: 64
Setting detail: THERAPIES SERIES
Discharge: HOME | End: 2019-10-07
Payer: MEDICARE

## 2019-10-07 PROCEDURE — 93798 PHYS/QHP OP CAR RHAB W/ECG: CPT

## 2019-10-09 ENCOUNTER — TELEPHONE (OUTPATIENT)
Dept: CARDIOLOGY CLINIC | Age: 64
End: 2019-10-09

## 2019-10-09 ENCOUNTER — HOSPITAL ENCOUNTER (OUTPATIENT)
Dept: CARDIAC REHAB | Age: 64
Setting detail: THERAPIES SERIES
Discharge: HOME | End: 2019-10-09
Payer: MEDICARE

## 2019-10-09 ENCOUNTER — APPOINTMENT (OUTPATIENT)
Dept: CARDIAC REHAB | Age: 64
End: 2019-10-09
Payer: MEDICARE

## 2019-10-09 PROCEDURE — 93798 PHYS/QHP OP CAR RHAB W/ECG: CPT

## 2019-10-11 ENCOUNTER — HOSPITAL ENCOUNTER (OUTPATIENT)
Dept: CARDIAC REHAB | Age: 64
Setting detail: THERAPIES SERIES
Discharge: HOME | End: 2019-10-11
Payer: MEDICARE

## 2019-10-11 ENCOUNTER — APPOINTMENT (OUTPATIENT)
Dept: CARDIAC REHAB | Age: 64
End: 2019-10-11
Payer: MEDICARE

## 2019-10-11 PROCEDURE — 93798 PHYS/QHP OP CAR RHAB W/ECG: CPT

## 2019-10-14 ENCOUNTER — HOSPITAL ENCOUNTER (OUTPATIENT)
Dept: CARDIAC REHAB | Age: 64
Setting detail: THERAPIES SERIES
Discharge: HOME | End: 2019-10-14
Payer: MEDICARE

## 2019-10-14 ENCOUNTER — APPOINTMENT (OUTPATIENT)
Dept: CARDIAC REHAB | Age: 64
End: 2019-10-14
Payer: MEDICARE

## 2019-10-14 ENCOUNTER — OFFICE VISIT (OUTPATIENT)
Dept: CARDIOLOGY CLINIC | Age: 64
End: 2019-10-14
Payer: MEDICARE

## 2019-10-14 VITALS
SYSTOLIC BLOOD PRESSURE: 110 MMHG | BODY MASS INDEX: 30.82 KG/M2 | OXYGEN SATURATION: 98 % | WEIGHT: 196.8 LBS | DIASTOLIC BLOOD PRESSURE: 72 MMHG | HEART RATE: 71 BPM

## 2019-10-14 DIAGNOSIS — F17.200 SMOKING: ICD-10-CM

## 2019-10-14 DIAGNOSIS — E78.00 HYPERCHOLESTEROLEMIA: ICD-10-CM

## 2019-10-14 DIAGNOSIS — I25.119 CORONARY ARTERY DISEASE INVOLVING NATIVE CORONARY ARTERY OF NATIVE HEART WITH ANGINA PECTORIS (HCC): Primary | ICD-10-CM

## 2019-10-14 DIAGNOSIS — I10 ESSENTIAL HYPERTENSION: ICD-10-CM

## 2019-10-14 PROCEDURE — 4004F PT TOBACCO SCREEN RCVD TLK: CPT | Performed by: INTERNAL MEDICINE

## 2019-10-14 PROCEDURE — 99214 OFFICE O/P EST MOD 30 MIN: CPT | Performed by: INTERNAL MEDICINE

## 2019-10-14 PROCEDURE — G8484 FLU IMMUNIZE NO ADMIN: HCPCS | Performed by: INTERNAL MEDICINE

## 2019-10-14 PROCEDURE — G8427 DOCREV CUR MEDS BY ELIG CLIN: HCPCS | Performed by: INTERNAL MEDICINE

## 2019-10-14 PROCEDURE — 93798 PHYS/QHP OP CAR RHAB W/ECG: CPT

## 2019-10-14 PROCEDURE — 3017F COLORECTAL CA SCREEN DOC REV: CPT | Performed by: INTERNAL MEDICINE

## 2019-10-14 PROCEDURE — G8417 CALC BMI ABV UP PARAM F/U: HCPCS | Performed by: INTERNAL MEDICINE

## 2019-10-14 PROCEDURE — 4040F PNEUMOC VAC/ADMIN/RCVD: CPT | Performed by: INTERNAL MEDICINE

## 2019-10-14 PROCEDURE — 1123F ACP DISCUSS/DSCN MKR DOCD: CPT | Performed by: INTERNAL MEDICINE

## 2019-10-14 PROCEDURE — G8598 ASA/ANTIPLAT THER USED: HCPCS | Performed by: INTERNAL MEDICINE

## 2019-10-16 ENCOUNTER — APPOINTMENT (OUTPATIENT)
Dept: CARDIAC REHAB | Age: 64
End: 2019-10-16
Payer: MEDICARE

## 2019-10-18 ENCOUNTER — APPOINTMENT (OUTPATIENT)
Dept: CARDIAC REHAB | Age: 64
End: 2019-10-18
Payer: MEDICARE

## 2019-10-21 ENCOUNTER — APPOINTMENT (OUTPATIENT)
Dept: CARDIAC REHAB | Age: 64
End: 2019-10-21
Payer: MEDICARE

## 2019-10-21 ENCOUNTER — NURSE TRIAGE (OUTPATIENT)
Dept: OTHER | Facility: CLINIC | Age: 64
End: 2019-10-21

## 2019-10-21 ENCOUNTER — OFFICE VISIT (OUTPATIENT)
Dept: FAMILY MEDICINE CLINIC | Age: 64
End: 2019-10-21
Payer: MEDICARE

## 2019-10-21 VITALS
SYSTOLIC BLOOD PRESSURE: 126 MMHG | WEIGHT: 196 LBS | OXYGEN SATURATION: 96 % | BODY MASS INDEX: 30.7 KG/M2 | HEART RATE: 84 BPM | DIASTOLIC BLOOD PRESSURE: 80 MMHG | TEMPERATURE: 98.2 F

## 2019-10-21 DIAGNOSIS — R06.2 WHEEZING: Primary | ICD-10-CM

## 2019-10-21 PROCEDURE — G8427 DOCREV CUR MEDS BY ELIG CLIN: HCPCS | Performed by: NURSE PRACTITIONER

## 2019-10-21 PROCEDURE — 1123F ACP DISCUSS/DSCN MKR DOCD: CPT | Performed by: NURSE PRACTITIONER

## 2019-10-21 PROCEDURE — 3017F COLORECTAL CA SCREEN DOC REV: CPT | Performed by: NURSE PRACTITIONER

## 2019-10-21 PROCEDURE — G8417 CALC BMI ABV UP PARAM F/U: HCPCS | Performed by: NURSE PRACTITIONER

## 2019-10-21 PROCEDURE — G8484 FLU IMMUNIZE NO ADMIN: HCPCS | Performed by: NURSE PRACTITIONER

## 2019-10-21 PROCEDURE — 4040F PNEUMOC VAC/ADMIN/RCVD: CPT | Performed by: NURSE PRACTITIONER

## 2019-10-21 PROCEDURE — 99214 OFFICE O/P EST MOD 30 MIN: CPT | Performed by: NURSE PRACTITIONER

## 2019-10-21 PROCEDURE — 4004F PT TOBACCO SCREEN RCVD TLK: CPT | Performed by: NURSE PRACTITIONER

## 2019-10-21 PROCEDURE — G8598 ASA/ANTIPLAT THER USED: HCPCS | Performed by: NURSE PRACTITIONER

## 2019-10-21 RX ORDER — ALBUTEROL SULFATE 90 UG/1
2 AEROSOL, METERED RESPIRATORY (INHALATION) EVERY 4 HOURS PRN
Qty: 1 INHALER | Refills: 1 | Status: SHIPPED | OUTPATIENT
Start: 2019-10-21 | End: 2020-10-20

## 2019-10-21 RX ORDER — PREDNISONE 20 MG/1
TABLET ORAL
Qty: 10 TABLET | Refills: 0 | Status: SHIPPED | OUTPATIENT
Start: 2019-10-21 | End: 2019-11-11 | Stop reason: ALTCHOICE

## 2019-10-21 RX ORDER — DOXYCYCLINE HYCLATE 100 MG/1
100 CAPSULE ORAL 2 TIMES DAILY
Qty: 14 CAPSULE | Refills: 0 | Status: SHIPPED | OUTPATIENT
Start: 2019-10-21 | End: 2019-10-28

## 2019-10-21 ASSESSMENT — ENCOUNTER SYMPTOMS
WHEEZING: 1
CONSTIPATION: 0
SINUS PRESSURE: 0
BLOOD IN STOOL: 0
SHORTNESS OF BREATH: 1
COUGH: 1
VOMITING: 0
NAUSEA: 0
CHEST TIGHTNESS: 1
DIARRHEA: 0
SINUS PAIN: 0
GASTROINTESTINAL NEGATIVE: 1
ABDOMINAL PAIN: 0
SORE THROAT: 1

## 2019-10-21 ASSESSMENT — PATIENT HEALTH QUESTIONNAIRE - PHQ9: DEPRESSION UNABLE TO ASSESS: URGENT/EMERGENT SITUATION

## 2019-10-23 ENCOUNTER — APPOINTMENT (OUTPATIENT)
Dept: CARDIAC REHAB | Age: 64
End: 2019-10-23
Payer: MEDICARE

## 2019-10-25 ENCOUNTER — APPOINTMENT (OUTPATIENT)
Dept: CARDIAC REHAB | Age: 64
End: 2019-10-25
Payer: MEDICARE

## 2019-10-28 ENCOUNTER — APPOINTMENT (OUTPATIENT)
Dept: CARDIAC REHAB | Age: 64
End: 2019-10-28
Payer: MEDICARE

## 2019-10-30 ENCOUNTER — APPOINTMENT (OUTPATIENT)
Dept: CARDIAC REHAB | Age: 64
End: 2019-10-30
Payer: MEDICARE

## 2019-11-01 ENCOUNTER — APPOINTMENT (OUTPATIENT)
Dept: CARDIAC REHAB | Age: 64
End: 2019-11-01
Payer: MEDICARE

## 2019-11-07 ENCOUNTER — NURSE ONLY (OUTPATIENT)
Dept: CARDIOLOGY CLINIC | Age: 64
End: 2019-11-07
Payer: MEDICARE

## 2019-11-07 ENCOUNTER — TELEPHONE (OUTPATIENT)
Dept: CARDIOLOGY CLINIC | Age: 64
End: 2019-11-07

## 2019-11-07 DIAGNOSIS — Z95.0 PACEMAKER: ICD-10-CM

## 2019-11-07 DIAGNOSIS — I44.39 HIGH DEGREE ATRIOVENTRICULAR BLOCK: ICD-10-CM

## 2019-11-07 PROCEDURE — 93294 REM INTERROG EVL PM/LDLS PM: CPT | Performed by: INTERNAL MEDICINE

## 2019-11-07 PROCEDURE — 93296 REM INTERROG EVL PM/IDS: CPT | Performed by: INTERNAL MEDICINE

## 2019-11-11 ENCOUNTER — NURSE ONLY (OUTPATIENT)
Dept: CARDIOLOGY CLINIC | Age: 64
End: 2019-11-11
Payer: MEDICARE

## 2019-11-11 ENCOUNTER — OFFICE VISIT (OUTPATIENT)
Dept: CARDIOLOGY CLINIC | Age: 64
End: 2019-11-11
Payer: MEDICARE

## 2019-11-11 VITALS
SYSTOLIC BLOOD PRESSURE: 134 MMHG | HEIGHT: 67 IN | WEIGHT: 197.6 LBS | BODY MASS INDEX: 31.01 KG/M2 | HEART RATE: 86 BPM | DIASTOLIC BLOOD PRESSURE: 78 MMHG

## 2019-11-11 DIAGNOSIS — Z95.0 PACEMAKER: ICD-10-CM

## 2019-11-11 DIAGNOSIS — R07.9 CHEST PAIN, UNSPECIFIED TYPE: ICD-10-CM

## 2019-11-11 DIAGNOSIS — I44.39 HIGH DEGREE ATRIOVENTRICULAR BLOCK: ICD-10-CM

## 2019-11-11 DIAGNOSIS — I45.5 SINUS PAUSE: Primary | ICD-10-CM

## 2019-11-11 DIAGNOSIS — R55 SYNCOPE, UNSPECIFIED SYNCOPE TYPE: ICD-10-CM

## 2019-11-11 DIAGNOSIS — I10 ESSENTIAL HYPERTENSION: ICD-10-CM

## 2019-11-11 PROCEDURE — G8598 ASA/ANTIPLAT THER USED: HCPCS | Performed by: NURSE PRACTITIONER

## 2019-11-11 PROCEDURE — 93000 ELECTROCARDIOGRAM COMPLETE: CPT | Performed by: NURSE PRACTITIONER

## 2019-11-11 PROCEDURE — 99214 OFFICE O/P EST MOD 30 MIN: CPT | Performed by: NURSE PRACTITIONER

## 2019-11-11 PROCEDURE — 1123F ACP DISCUSS/DSCN MKR DOCD: CPT | Performed by: NURSE PRACTITIONER

## 2019-11-11 PROCEDURE — 4004F PT TOBACCO SCREEN RCVD TLK: CPT | Performed by: NURSE PRACTITIONER

## 2019-11-11 PROCEDURE — G8417 CALC BMI ABV UP PARAM F/U: HCPCS | Performed by: NURSE PRACTITIONER

## 2019-11-11 PROCEDURE — 4040F PNEUMOC VAC/ADMIN/RCVD: CPT | Performed by: NURSE PRACTITIONER

## 2019-11-11 PROCEDURE — 3017F COLORECTAL CA SCREEN DOC REV: CPT | Performed by: NURSE PRACTITIONER

## 2019-11-11 PROCEDURE — G8427 DOCREV CUR MEDS BY ELIG CLIN: HCPCS | Performed by: NURSE PRACTITIONER

## 2019-11-11 PROCEDURE — G8484 FLU IMMUNIZE NO ADMIN: HCPCS | Performed by: NURSE PRACTITIONER

## 2019-11-11 PROCEDURE — 93280 PM DEVICE PROGR EVAL DUAL: CPT | Performed by: INTERNAL MEDICINE

## 2019-11-11 RX ORDER — METOPROLOL TARTRATE 50 MG/1
50 TABLET, FILM COATED ORAL 2 TIMES DAILY
Qty: 60 TABLET | Refills: 5 | Status: SHIPPED | OUTPATIENT
Start: 2019-11-11 | End: 2020-02-04 | Stop reason: SDUPTHER

## 2019-11-19 ENCOUNTER — OFFICE VISIT (OUTPATIENT)
Dept: FAMILY MEDICINE CLINIC | Age: 64
End: 2019-11-19
Payer: MEDICARE

## 2019-11-19 ENCOUNTER — TELEPHONE (OUTPATIENT)
Dept: CARDIOLOGY CLINIC | Age: 64
End: 2019-11-19

## 2019-11-19 VITALS
BODY MASS INDEX: 31.17 KG/M2 | HEART RATE: 104 BPM | WEIGHT: 199 LBS | DIASTOLIC BLOOD PRESSURE: 80 MMHG | TEMPERATURE: 98.3 F | SYSTOLIC BLOOD PRESSURE: 120 MMHG | OXYGEN SATURATION: 98 %

## 2019-11-19 DIAGNOSIS — M54.50 LOW BACK PAIN WITHOUT SCIATICA, UNSPECIFIED BACK PAIN LATERALITY, UNSPECIFIED CHRONICITY: Primary | ICD-10-CM

## 2019-11-19 DIAGNOSIS — R31.9 HEMATURIA, UNSPECIFIED TYPE: ICD-10-CM

## 2019-11-19 DIAGNOSIS — Z12.5 ENCOUNTER FOR SCREENING FOR MALIGNANT NEOPLASM OF PROSTATE: ICD-10-CM

## 2019-11-19 DIAGNOSIS — R07.9 CHEST PAIN, UNSPECIFIED TYPE: ICD-10-CM

## 2019-11-19 DIAGNOSIS — R00.2 PALPITATIONS: ICD-10-CM

## 2019-11-19 LAB
BILIRUBIN, POC: ABNORMAL
BLOOD URINE, POC: ABNORMAL
CLARITY, POC: YELLOW
COLOR, POC: ABNORMAL
GLUCOSE URINE, POC: ABNORMAL
KETONES, POC: ABNORMAL
LEUKOCYTE EST, POC: ABNORMAL
NITRITE, POC: ABNORMAL
PH, POC: 5.5
PROSTATE SPECIFIC ANTIGEN: 0.36 NG/ML (ref 0–4)
PROTEIN, POC: ABNORMAL
SPECIFIC GRAVITY, POC: >=1.03
UROBILINOGEN, POC: 0.2

## 2019-11-19 PROCEDURE — 99213 OFFICE O/P EST LOW 20 MIN: CPT | Performed by: NURSE PRACTITIONER

## 2019-11-19 PROCEDURE — 4004F PT TOBACCO SCREEN RCVD TLK: CPT | Performed by: NURSE PRACTITIONER

## 2019-11-19 PROCEDURE — 36415 COLL VENOUS BLD VENIPUNCTURE: CPT | Performed by: NURSE PRACTITIONER

## 2019-11-19 PROCEDURE — 4040F PNEUMOC VAC/ADMIN/RCVD: CPT | Performed by: NURSE PRACTITIONER

## 2019-11-19 PROCEDURE — G8427 DOCREV CUR MEDS BY ELIG CLIN: HCPCS | Performed by: NURSE PRACTITIONER

## 2019-11-19 PROCEDURE — G8417 CALC BMI ABV UP PARAM F/U: HCPCS | Performed by: NURSE PRACTITIONER

## 2019-11-19 PROCEDURE — 3017F COLORECTAL CA SCREEN DOC REV: CPT | Performed by: NURSE PRACTITIONER

## 2019-11-19 PROCEDURE — G8598 ASA/ANTIPLAT THER USED: HCPCS | Performed by: NURSE PRACTITIONER

## 2019-11-19 PROCEDURE — G8484 FLU IMMUNIZE NO ADMIN: HCPCS | Performed by: NURSE PRACTITIONER

## 2019-11-19 PROCEDURE — 81002 URINALYSIS NONAUTO W/O SCOPE: CPT | Performed by: NURSE PRACTITIONER

## 2019-11-19 PROCEDURE — 1123F ACP DISCUSS/DSCN MKR DOCD: CPT | Performed by: NURSE PRACTITIONER

## 2019-11-19 RX ORDER — SULFAMETHOXAZOLE AND TRIMETHOPRIM 800; 160 MG/1; MG/1
1 TABLET ORAL 2 TIMES DAILY
Qty: 14 TABLET | Refills: 0 | Status: SHIPPED | OUTPATIENT
Start: 2019-11-19 | End: 2019-11-26

## 2019-11-19 ASSESSMENT — ENCOUNTER SYMPTOMS
SHORTNESS OF BREATH: 1
COUGH: 1
WHEEZING: 1

## 2019-11-20 ENCOUNTER — TELEPHONE (OUTPATIENT)
Dept: CARDIOLOGY CLINIC | Age: 64
End: 2019-11-20

## 2019-11-21 DIAGNOSIS — R31.9 HEMATURIA, UNSPECIFIED TYPE: Primary | ICD-10-CM

## 2019-11-21 LAB — URINE CULTURE, ROUTINE: NORMAL

## 2019-11-22 ENCOUNTER — TELEPHONE (OUTPATIENT)
Dept: FAMILY MEDICINE CLINIC | Age: 64
End: 2019-11-22

## 2020-01-21 ENCOUNTER — HOSPITAL ENCOUNTER (OUTPATIENT)
Dept: CARDIAC REHAB | Age: 65
Setting detail: THERAPIES SERIES
Discharge: HOME | End: 2020-01-21
Payer: MEDICARE

## 2020-01-24 ENCOUNTER — HOSPITAL ENCOUNTER (OUTPATIENT)
Dept: CARDIAC REHAB | Age: 65
Setting detail: THERAPIES SERIES
Discharge: HOME | End: 2020-01-24
Payer: MEDICARE

## 2020-01-24 PROCEDURE — 93798 PHYS/QHP OP CAR RHAB W/ECG: CPT

## 2020-01-27 ENCOUNTER — HOSPITAL ENCOUNTER (OUTPATIENT)
Dept: CARDIAC REHAB | Age: 65
Setting detail: THERAPIES SERIES
Discharge: HOME | End: 2020-01-27
Payer: MEDICARE

## 2020-01-27 PROCEDURE — 93798 PHYS/QHP OP CAR RHAB W/ECG: CPT

## 2020-01-29 ENCOUNTER — HOSPITAL ENCOUNTER (OUTPATIENT)
Dept: CARDIAC REHAB | Age: 65
Setting detail: THERAPIES SERIES
Discharge: HOME | End: 2020-01-29
Payer: MEDICARE

## 2020-01-29 PROCEDURE — 93798 PHYS/QHP OP CAR RHAB W/ECG: CPT

## 2020-01-30 ENCOUNTER — TELEPHONE (OUTPATIENT)
Dept: CARDIOLOGY CLINIC | Age: 65
End: 2020-01-30

## 2020-01-30 NOTE — TELEPHONE ENCOUNTER
Beth from [facility] calling to have the pt's [address] [address] transferred to [facility].

## 2020-01-31 ENCOUNTER — HOSPITAL ENCOUNTER (OUTPATIENT)
Dept: CARDIAC REHAB | Age: 65
Setting detail: THERAPIES SERIES
Discharge: HOME | End: 2020-01-31
Payer: MEDICARE

## 2020-01-31 PROCEDURE — 93798 PHYS/QHP OP CAR RHAB W/ECG: CPT

## 2020-02-03 ENCOUNTER — HOSPITAL ENCOUNTER (OUTPATIENT)
Dept: CARDIAC REHAB | Age: 65
Setting detail: THERAPIES SERIES
Discharge: HOME | End: 2020-02-03
Payer: MEDICARE

## 2020-02-03 PROCEDURE — 93798 PHYS/QHP OP CAR RHAB W/ECG: CPT

## 2020-02-04 NOTE — TELEPHONE ENCOUNTER
[facility] Medication Refills:    Requested Prescriptions     Pending Prescriptions Disp Refills    metoprolol tartrate (LOPRESSOR) 25 MG tablet [Pharmacy Med Name: METOPROLOL TARTRATE 25 MG TAB] 180 tablet 3     Sig: TAKE 1 TABLET BY MOUTH TWICE A DAY                     Last Office Visit: 12/19/19  Next Office Visit: Davon Gutierrez Refill: 11/11/19  Last Labs: 10/1/19

## 2020-02-05 ENCOUNTER — HOSPITAL ENCOUNTER (OUTPATIENT)
Dept: CARDIAC REHAB | Age: 65
Setting detail: THERAPIES SERIES
Discharge: HOME | End: 2020-02-05
Payer: MEDICARE

## 2020-02-05 PROCEDURE — 93798 PHYS/QHP OP CAR RHAB W/ECG: CPT

## 2020-02-07 ENCOUNTER — HOSPITAL ENCOUNTER (OUTPATIENT)
Dept: CARDIAC REHAB | Age: 65
Setting detail: THERAPIES SERIES
Discharge: HOME | End: 2020-02-07
Payer: MEDICARE

## 2020-02-07 PROCEDURE — 93798 PHYS/QHP OP CAR RHAB W/ECG: CPT

## 2020-02-10 ENCOUNTER — HOSPITAL ENCOUNTER (OUTPATIENT)
Dept: CARDIAC REHAB | Age: 65
Setting detail: THERAPIES SERIES
Discharge: HOME | End: 2020-02-10
Payer: MEDICARE

## 2020-02-10 PROCEDURE — 93798 PHYS/QHP OP CAR RHAB W/ECG: CPT

## 2020-02-12 ENCOUNTER — HOSPITAL ENCOUNTER (OUTPATIENT)
Dept: CARDIAC REHAB | Age: 65
Setting detail: THERAPIES SERIES
Discharge: HOME | End: 2020-02-12
Payer: MEDICARE

## 2020-02-12 PROCEDURE — 93798 PHYS/QHP OP CAR RHAB W/ECG: CPT

## 2020-02-17 ENCOUNTER — HOSPITAL ENCOUNTER (OUTPATIENT)
Dept: CARDIAC REHAB | Age: 65
Setting detail: THERAPIES SERIES
Discharge: HOME | End: 2020-02-17
Payer: MEDICARE

## 2020-02-17 PROCEDURE — 93798 PHYS/QHP OP CAR RHAB W/ECG: CPT

## 2020-02-19 ENCOUNTER — HOSPITAL ENCOUNTER (OUTPATIENT)
Dept: CARDIAC REHAB | Age: 65
Setting detail: THERAPIES SERIES
Discharge: HOME | End: 2020-02-19
Payer: MEDICARE

## 2020-02-19 PROCEDURE — 93798 PHYS/QHP OP CAR RHAB W/ECG: CPT

## 2020-02-21 ENCOUNTER — HOSPITAL ENCOUNTER (OUTPATIENT)
Dept: CARDIAC REHAB | Age: 65
Setting detail: THERAPIES SERIES
Discharge: HOME | End: 2020-02-21
Payer: MEDICARE

## 2020-02-21 PROCEDURE — 93798 PHYS/QHP OP CAR RHAB W/ECG: CPT

## 2020-02-24 ENCOUNTER — HOSPITAL ENCOUNTER (OUTPATIENT)
Dept: CARDIAC REHAB | Age: 65
Setting detail: THERAPIES SERIES
Discharge: HOME | End: 2020-02-24
Payer: MEDICARE

## 2020-02-24 PROCEDURE — 93798 PHYS/QHP OP CAR RHAB W/ECG: CPT

## 2020-02-26 ENCOUNTER — HOSPITAL ENCOUNTER (OUTPATIENT)
Dept: CARDIAC REHAB | Age: 65
Setting detail: THERAPIES SERIES
Discharge: HOME | End: 2020-02-26
Payer: MEDICARE

## 2020-02-26 PROCEDURE — 93798 PHYS/QHP OP CAR RHAB W/ECG: CPT

## 2020-03-02 ENCOUNTER — HOSPITAL ENCOUNTER (OUTPATIENT)
Dept: CARDIAC REHAB | Age: 65
Setting detail: THERAPIES SERIES
Discharge: HOME | End: 2020-03-02
Payer: MEDICARE

## 2020-03-02 PROCEDURE — 93798 PHYS/QHP OP CAR RHAB W/ECG: CPT

## 2020-03-03 LAB
AVERAGE GLUCOSE: NORMAL
HBA1C MFR BLD: 5.9 %

## 2020-03-04 ENCOUNTER — HOSPITAL ENCOUNTER (OUTPATIENT)
Dept: CARDIAC REHAB | Age: 65
Setting detail: THERAPIES SERIES
Discharge: HOME | End: 2020-03-04
Payer: MEDICARE

## 2020-03-04 PROCEDURE — 93798 PHYS/QHP OP CAR RHAB W/ECG: CPT

## 2020-03-06 ENCOUNTER — HOSPITAL ENCOUNTER (OUTPATIENT)
Dept: CARDIAC REHAB | Age: 65
Setting detail: THERAPIES SERIES
Discharge: HOME | End: 2020-03-06
Payer: MEDICARE

## 2020-03-06 PROCEDURE — 93798 PHYS/QHP OP CAR RHAB W/ECG: CPT

## 2020-03-09 ENCOUNTER — HOSPITAL ENCOUNTER (OUTPATIENT)
Dept: CARDIAC REHAB | Age: 65
Setting detail: THERAPIES SERIES
Discharge: HOME | End: 2020-03-09
Payer: MEDICARE

## 2020-03-09 PROCEDURE — 93798 PHYS/QHP OP CAR RHAB W/ECG: CPT

## 2020-03-11 ENCOUNTER — HOSPITAL ENCOUNTER (OUTPATIENT)
Dept: CARDIAC REHAB | Age: 65
Setting detail: THERAPIES SERIES
Discharge: HOME | End: 2020-03-11
Payer: MEDICARE

## 2020-03-11 PROCEDURE — 93798 PHYS/QHP OP CAR RHAB W/ECG: CPT

## 2020-03-13 ENCOUNTER — HOSPITAL ENCOUNTER (OUTPATIENT)
Dept: CARDIAC REHAB | Age: 65
Setting detail: THERAPIES SERIES
Discharge: HOME | End: 2020-03-13
Payer: MEDICARE

## 2020-03-13 PROCEDURE — 93798 PHYS/QHP OP CAR RHAB W/ECG: CPT

## 2020-03-16 ENCOUNTER — HOSPITAL ENCOUNTER (OUTPATIENT)
Dept: CARDIAC REHAB | Age: 65
Setting detail: THERAPIES SERIES
Discharge: HOME | End: 2020-03-16
Payer: MEDICARE

## 2020-03-16 PROCEDURE — 93798 PHYS/QHP OP CAR RHAB W/ECG: CPT

## 2020-03-18 ENCOUNTER — APPOINTMENT (OUTPATIENT)
Dept: CARDIAC REHAB | Age: 65
End: 2020-03-18
Payer: MEDICARE

## 2020-03-20 ENCOUNTER — APPOINTMENT (OUTPATIENT)
Dept: CARDIAC REHAB | Age: 65
End: 2020-03-20
Payer: MEDICARE

## 2020-03-23 ENCOUNTER — APPOINTMENT (OUTPATIENT)
Dept: CARDIAC REHAB | Age: 65
End: 2020-03-23
Payer: MEDICARE

## 2020-03-25 ENCOUNTER — APPOINTMENT (OUTPATIENT)
Dept: CARDIAC REHAB | Age: 65
End: 2020-03-25
Payer: MEDICARE

## 2020-03-25 PROBLEM — I10 HTN (HYPERTENSION): Status: RESOLVED | Noted: 2019-02-18 | Resolved: 2020-03-24

## 2020-03-27 ENCOUNTER — APPOINTMENT (OUTPATIENT)
Dept: CARDIAC REHAB | Age: 65
End: 2020-03-27
Payer: MEDICARE

## 2020-03-30 ENCOUNTER — APPOINTMENT (OUTPATIENT)
Dept: CARDIAC REHAB | Age: 65
End: 2020-03-30
Payer: MEDICARE

## 2020-07-06 ENCOUNTER — HOSPITAL ENCOUNTER (OUTPATIENT)
Dept: CARDIAC REHAB | Age: 65
Setting detail: THERAPIES SERIES
Discharge: HOME | End: 2020-07-06
Payer: MEDICARE

## 2020-07-06 PROCEDURE — 93798 PHYS/QHP OP CAR RHAB W/ECG: CPT

## 2020-07-08 ENCOUNTER — HOSPITAL ENCOUNTER (OUTPATIENT)
Dept: CARDIAC REHAB | Age: 65
Setting detail: THERAPIES SERIES
Discharge: HOME | End: 2020-07-08
Payer: MEDICARE

## 2020-07-08 PROCEDURE — 93798 PHYS/QHP OP CAR RHAB W/ECG: CPT

## 2020-07-10 ENCOUNTER — APPOINTMENT (OUTPATIENT)
Dept: CARDIAC REHAB | Age: 65
End: 2020-07-10
Payer: MEDICARE

## 2020-07-13 ENCOUNTER — HOSPITAL ENCOUNTER (OUTPATIENT)
Dept: CARDIAC REHAB | Age: 65
Setting detail: THERAPIES SERIES
Discharge: HOME | End: 2020-07-13
Payer: MEDICARE

## 2020-07-13 PROCEDURE — 93798 PHYS/QHP OP CAR RHAB W/ECG: CPT

## 2020-07-15 ENCOUNTER — HOSPITAL ENCOUNTER (OUTPATIENT)
Dept: CARDIAC REHAB | Age: 65
Setting detail: THERAPIES SERIES
Discharge: HOME | End: 2020-07-15
Payer: MEDICARE

## 2020-07-15 PROCEDURE — 93798 PHYS/QHP OP CAR RHAB W/ECG: CPT

## 2020-07-17 ENCOUNTER — HOSPITAL ENCOUNTER (OUTPATIENT)
Dept: CARDIAC REHAB | Age: 65
Setting detail: THERAPIES SERIES
Discharge: HOME | End: 2020-07-17
Payer: MEDICARE

## 2020-07-17 PROCEDURE — 93798 PHYS/QHP OP CAR RHAB W/ECG: CPT

## 2020-07-20 ENCOUNTER — HOSPITAL ENCOUNTER (OUTPATIENT)
Dept: CARDIAC REHAB | Age: 65
Setting detail: THERAPIES SERIES
Discharge: HOME | End: 2020-07-20
Payer: MEDICARE

## 2020-07-20 PROCEDURE — 93798 PHYS/QHP OP CAR RHAB W/ECG: CPT

## 2020-07-22 ENCOUNTER — HOSPITAL ENCOUNTER (OUTPATIENT)
Dept: CARDIAC REHAB | Age: 65
Setting detail: THERAPIES SERIES
Discharge: HOME | End: 2020-07-22
Payer: MEDICARE

## 2020-07-22 PROCEDURE — 93798 PHYS/QHP OP CAR RHAB W/ECG: CPT

## 2020-07-24 ENCOUNTER — HOSPITAL ENCOUNTER (OUTPATIENT)
Dept: CARDIAC REHAB | Age: 65
Setting detail: THERAPIES SERIES
Discharge: HOME | End: 2020-07-24
Payer: MEDICARE

## 2020-07-24 PROCEDURE — 93798 PHYS/QHP OP CAR RHAB W/ECG: CPT

## 2020-07-27 ENCOUNTER — HOSPITAL ENCOUNTER (OUTPATIENT)
Dept: CARDIAC REHAB | Age: 65
Setting detail: THERAPIES SERIES
Discharge: HOME | End: 2020-07-27
Payer: MEDICARE

## 2020-07-27 PROCEDURE — 93798 PHYS/QHP OP CAR RHAB W/ECG: CPT

## 2020-07-29 ENCOUNTER — HOSPITAL ENCOUNTER (OUTPATIENT)
Dept: CARDIAC REHAB | Age: 65
Setting detail: THERAPIES SERIES
Discharge: HOME | End: 2020-07-29
Payer: MEDICARE

## 2020-07-29 PROCEDURE — 93798 PHYS/QHP OP CAR RHAB W/ECG: CPT

## 2020-07-31 ENCOUNTER — HOSPITAL ENCOUNTER (OUTPATIENT)
Dept: CARDIAC REHAB | Age: 65
Setting detail: THERAPIES SERIES
Discharge: HOME | End: 2020-07-31
Payer: MEDICARE

## 2020-07-31 PROCEDURE — 93798 PHYS/QHP OP CAR RHAB W/ECG: CPT

## 2020-08-03 ENCOUNTER — HOSPITAL ENCOUNTER (OUTPATIENT)
Dept: CARDIAC REHAB | Age: 65
Setting detail: THERAPIES SERIES
Discharge: HOME | End: 2020-08-03
Payer: MEDICARE

## 2020-08-03 PROCEDURE — 93798 PHYS/QHP OP CAR RHAB W/ECG: CPT

## 2020-08-05 ENCOUNTER — HOSPITAL ENCOUNTER (OUTPATIENT)
Dept: CARDIAC REHAB | Age: 65
Setting detail: THERAPIES SERIES
Discharge: HOME | End: 2020-08-05
Payer: MEDICARE

## 2020-08-05 PROCEDURE — 93798 PHYS/QHP OP CAR RHAB W/ECG: CPT

## 2020-08-07 ENCOUNTER — HOSPITAL ENCOUNTER (OUTPATIENT)
Dept: CARDIAC REHAB | Age: 65
Setting detail: THERAPIES SERIES
Discharge: HOME | End: 2020-08-07
Payer: MEDICARE

## 2020-08-07 PROCEDURE — 93798 PHYS/QHP OP CAR RHAB W/ECG: CPT

## 2020-08-12 ENCOUNTER — HOSPITAL ENCOUNTER (OUTPATIENT)
Dept: CARDIAC REHAB | Age: 65
Setting detail: THERAPIES SERIES
Discharge: HOME | End: 2020-08-12
Payer: MEDICARE

## 2020-08-12 PROCEDURE — 93798 PHYS/QHP OP CAR RHAB W/ECG: CPT

## 2020-08-14 ENCOUNTER — APPOINTMENT (OUTPATIENT)
Dept: CARDIAC REHAB | Age: 65
End: 2020-08-14
Payer: MEDICARE

## 2020-08-17 ENCOUNTER — APPOINTMENT (OUTPATIENT)
Dept: CARDIAC REHAB | Age: 65
End: 2020-08-17
Payer: MEDICARE

## 2020-08-19 ENCOUNTER — APPOINTMENT (OUTPATIENT)
Dept: CARDIAC REHAB | Age: 65
End: 2020-08-19
Payer: MEDICARE

## 2020-08-21 ENCOUNTER — APPOINTMENT (OUTPATIENT)
Dept: CARDIAC REHAB | Age: 65
End: 2020-08-21
Payer: MEDICARE

## 2021-01-01 NOTE — PROGRESS NOTES
Range of Motion: From 0 to 130 degrees of flexion without pain.     Strength: 5/5 strength of the quadriceps and hamstrings.      Neurologic and vascular: Normal motor and sensory function in the deep peroneal, superficial peroneal, tibial, saphenous, and sural nerve distributions. Foot warm and well perfused        Comparison Right Knee Exam:   Overall alignment is neutral     Gait/Alignment: No use of assistive devices.      Inspection/Skin: Skin is intact without erythema or ecchymosis. No significant swelling. No deformity.      Effusion; none.      Palpation: Nontender to the medial and lateral joint lines. Good patella mobilization. Negative patellar grind. Calf compartments are soft and non-tender. No signs of DVT.      Range of Motion: From 0 to 130 degrees of flexion without pain.     Strength: 5/5 strength of the quadriceps and hamstrings.      Ligamentous Stability: Stable to valgus and varus stress testing at 0° and 30°. Lachman's has a solid endpoint. No posterior sag. Negative posterior drawer.     Neurologic and vascular: Normal motor and sensory function in the deep peroneal, superficial peroneal, tibial, saphenous, and sural nerve distributions. Foot warm and well perfused      Radiology:     3-view X-rays of the left knee including AP, lateral and Merchant views were obtained and reviewed in office today: X-rays demonstrate implants look well aligned without any evidence of loosening or osteolysis. No focal bony abnormalities or obvious osseous defects can be appreciated. Impression: Working diagnosis is as follows:  1. Infected versus loose left total knee arthroplasty  2. Saphenous neuritis      Treatment Plan:  Radiographs, exam findings, and diagnosis were explained to the patient today. He is having some saphenous neuritis that causes moderate discomfort. Additionally, we do need to rule out any infectious cause of his pain.   He states that he did have cultures taken in
Term birth of  female

## 2021-04-12 ENCOUNTER — IMMUNIZATION (OUTPATIENT)
Dept: PRIMARY CARE CLINIC | Age: 66
End: 2021-04-12
Payer: MEDICARE

## 2021-04-12 PROCEDURE — 91300 COVID-19, PFIZER VACCINE 30MCG/0.3ML DOSE: CPT | Performed by: FAMILY MEDICINE

## 2021-04-12 PROCEDURE — 0001A COVID-19, PFIZER VACCINE 30MCG/0.3ML DOSE: CPT | Performed by: FAMILY MEDICINE

## 2021-05-03 ENCOUNTER — IMMUNIZATION (OUTPATIENT)
Dept: PRIMARY CARE CLINIC | Age: 66
End: 2021-05-03
Payer: MEDICARE

## 2021-05-03 PROCEDURE — 0002A COVID-19, PFIZER VACCINE 30MCG/0.3ML DOSE: CPT | Performed by: FAMILY MEDICINE

## 2021-05-03 PROCEDURE — 91300 COVID-19, PFIZER VACCINE 30MCG/0.3ML DOSE: CPT | Performed by: FAMILY MEDICINE

## 2023-08-03 NOTE — PROGRESS NOTES
Urinary Tract Infection, Adult  A urinary tract infection (UTI) is an infection of any part of the urinary tract. The urinary tract includes:  The kidneys.  The ureters.  The bladder.  The urethra.  These organs make, store, and get rid of pee (urine) in the body.  What are the causes?  This infection is caused by germs (bacteria) in your genital area. These germs grow and cause swelling (inflammation) of your urinary tract.  What increases the risk?  The following factors may make you more likely to develop this condition:  Using a small, thin tube (catheter) to drain pee.  Not being able to control when you pee or poop (incontinence).  Being female. If you are female, these things can increase the risk:  Using these methods to prevent pregnancy:  A medicine that kills sperm (spermicide).  A device that blocks sperm (diaphragm).  Having low levels of a female hormone (estrogen).  Being pregnant.  You are more likely to develop this condition if:  You have genes that add to your risk.  You are sexually active.  You take antibiotic medicines.  You have trouble peeing because of:  A prostate that is bigger than normal, if you are male.  A blockage in the part of your body that drains pee from the bladder.  A kidney stone.  A nerve condition that affects your bladder.  Not getting enough to drink.  Not peeing often enough.  You have other conditions, such as:  Diabetes.  A weak disease-fighting system (immune system).  Sickle cell disease.  Gout.  Injury of the spine.  What are the signs or symptoms?  Symptoms of this condition include:  Needing to pee right away.  Peeing small amounts often.  Pain or burning when peeing.  Blood in the pee.  Pee that smells bad or not like normal.  Trouble peeing.  Pee that is cloudy.  Fluid coming from the vagina, if you are female.  Pain in the belly or lower back.  Other symptoms include:  Vomiting.  Not feeling hungry.  Feeling mixed up (confused). This may be the first symptom in  older adults.  Being tired and grouchy (irritable).  A fever.  Watery poop (diarrhea).  How is this treated?  Taking antibiotic medicine.  Taking other medicines.  Drinking enough water.  In some cases, you may need to see a specialist.  Follow these instructions at home:    Medicines  Take over-the-counter and prescription medicines only as told by your doctor.  If you were prescribed an antibiotic medicine, take it as told by your doctor. Do not stop taking it even if you start to feel better.  General instructions  Make sure you:  Pee until your bladder is empty.  Do not hold pee for a long time.  Empty your bladder after sex.  Wipe from front to back after peeing or pooping if you are a female. Use each tissue one time when you wipe.  Drink enough fluid to keep your pee pale yellow.  Keep all follow-up visits.  Contact a doctor if:  You do not get better after 1-2 days.  Your symptoms go away and then come back.  Get help right away if:  You have very bad back pain.  You have very bad pain in your lower belly.  You have a fever.  You have chills.  You feeling like you will vomit or you vomit.  Summary  A urinary tract infection (UTI) is an infection of any part of the urinary tract.  This condition is caused by germs in your genital area.  There are many risk factors for a UTI.  Treatment includes antibiotic medicines.  Drink enough fluid to keep your pee pale yellow.  This information is not intended to replace advice given to you by your health care provider. Make sure you discuss any questions you have with your health care provider.  Document Revised: 07/30/2021 Document Reviewed: 07/30/2021  Elsevier Patient Education © 2023 Elsevier Inc.     orders of the defined types were placed in this encounter. Prior to Admission medications    Medication Sig Start Date End Date Taking? Authorizing Provider   HYDROcodone-acetaminophen (NORCO) 5-325 MG per tablet Take 1 tablet by mouth every 6 hours as needed for Pain. Yes Historical Provider, MD   gabapentin (NEURONTIN) 300 MG capsule Take 1 capsule by mouth 3 times daily 8/11/17  Yes Ofelia Rankin MD       Past Medical History:   Diagnosis Date    Arthritis     knee    Hyperlipidemia     Knee joint dislocation     left    Urinary incontinence        Past Surgical History:   Procedure Laterality Date    CHOLECYSTECTOMY      KNEE ARTHROSCOPY Left 03/03/2017     LEFT KNEE ARTHROSCOPY, SYNOVECTOMY      OTHER SURGICAL HISTORY Left 06/06/2017    LEFT TOTAL KNEE ARTHROPLASTY     TOTAL KNEE ARTHROPLASTY Left 2017       Social History   Substance Use Topics    Smoking status: Current Every Day Smoker     Packs/day: 1.00     Years: 30.00     Types: Cigarettes    Smokeless tobacco: Never Used    Alcohol use No       History reviewed. No pertinent family history.

## 2025-04-27 ENCOUNTER — HOSPITAL ENCOUNTER (EMERGENCY)
Age: 70
Discharge: HOME | End: 2025-04-27
Payer: MEDICARE

## 2025-04-27 ENCOUNTER — APPOINTMENT (OUTPATIENT)
Dept: GENERAL RADIOLOGY | Age: 70
End: 2025-04-27
Payer: MEDICARE

## 2025-04-27 VITALS
TEMPERATURE: 98 F | BODY MASS INDEX: 30.17 KG/M2 | SYSTOLIC BLOOD PRESSURE: 151 MMHG | HEIGHT: 67 IN | DIASTOLIC BLOOD PRESSURE: 83 MMHG | HEART RATE: 88 BPM | RESPIRATION RATE: 16 BRPM | OXYGEN SATURATION: 98 % | WEIGHT: 192.2 LBS

## 2025-04-27 DIAGNOSIS — R06.09 DYSPNEA ON EXERTION: Primary | ICD-10-CM

## 2025-04-27 DIAGNOSIS — R05.8 PRODUCTIVE COUGH: ICD-10-CM

## 2025-04-27 DIAGNOSIS — Z87.891 HISTORY OF PRIOR CIGARETTE SMOKING: ICD-10-CM

## 2025-04-27 LAB
ALBUMIN SERPL-MCNC: 4.6 G/DL (ref 3.4–5)
ALBUMIN/GLOB SERPL: 2.2 {RATIO} (ref 1.1–2.2)
ALP SERPL-CCNC: 59 U/L (ref 40–129)
ALT SERPL-CCNC: 32 U/L (ref 10–40)
ANION GAP SERPL CALCULATED.3IONS-SCNC: 15 MMOL/L (ref 3–16)
AST SERPL-CCNC: 21 U/L (ref 15–37)
BASOPHILS # BLD: 0 K/UL (ref 0–0.2)
BASOPHILS NFR BLD: 0.9 %
BILIRUB SERPL-MCNC: 1 MG/DL (ref 0–1)
BUN SERPL-MCNC: 11 MG/DL (ref 7–20)
CALCIUM SERPL-MCNC: 9.3 MG/DL (ref 8.3–10.6)
CHLORIDE SERPL-SCNC: 104 MMOL/L (ref 99–110)
CO2 SERPL-SCNC: 21 MMOL/L (ref 21–32)
CREAT SERPL-MCNC: 0.7 MG/DL (ref 0.8–1.3)
DEPRECATED RDW RBC AUTO: 12.5 % (ref 12.4–15.4)
EKG ATRIAL RATE: 85 BPM
EKG DIAGNOSIS: NORMAL
EKG P AXIS: 47 DEGREES
EKG P-R INTERVAL: 144 MS
EKG Q-T INTERVAL: 354 MS
EKG QRS DURATION: 86 MS
EKG QTC CALCULATION (BAZETT): 421 MS
EKG R AXIS: 32 DEGREES
EKG T AXIS: 14 DEGREES
EKG VENTRICULAR RATE: 85 BPM
EOSINOPHIL # BLD: 0.1 K/UL (ref 0–0.6)
EOSINOPHIL NFR BLD: 2.6 %
FLUAV RNA RESP QL NAA+PROBE: NOT DETECTED
FLUBV RNA RESP QL NAA+PROBE: NOT DETECTED
GFR SERPLBLD CREATININE-BSD FMLA CKD-EPI: >90 ML/MIN/{1.73_M2}
GLUCOSE SERPL-MCNC: 272 MG/DL (ref 70–99)
HCT VFR BLD AUTO: 45.2 % (ref 40.5–52.5)
HGB BLD-MCNC: 15.9 G/DL (ref 13.5–17.5)
LYMPHOCYTES # BLD: 2.1 K/UL (ref 1–5.1)
LYMPHOCYTES NFR BLD: 36.5 %
MCH RBC QN AUTO: 33.1 PG (ref 26–34)
MCHC RBC AUTO-ENTMCNC: 35.1 G/DL (ref 31–36)
MCV RBC AUTO: 94.1 FL (ref 80–100)
MONOCYTES # BLD: 0.5 K/UL (ref 0–1.3)
MONOCYTES NFR BLD: 9.4 %
NEUTROPHILS # BLD: 2.9 K/UL (ref 1.7–7.7)
NEUTROPHILS NFR BLD: 50.6 %
PLATELET # BLD AUTO: 177 K/UL (ref 135–450)
PMV BLD AUTO: 9.2 FL (ref 5–10.5)
POTASSIUM SERPL-SCNC: 3.9 MMOL/L (ref 3.5–5.1)
PROT SERPL-MCNC: 6.7 G/DL (ref 6.4–8.2)
RBC # BLD AUTO: 4.8 M/UL (ref 4.2–5.9)
SARS-COV-2 RNA RESP QL NAA+PROBE: NOT DETECTED
SODIUM SERPL-SCNC: 140 MMOL/L (ref 136–145)
TROPONIN, HIGH SENSITIVITY: 11 NG/L (ref 0–22)
WBC # BLD AUTO: 5.8 K/UL (ref 4–11)

## 2025-04-27 PROCEDURE — 84484 ASSAY OF TROPONIN QUANT: CPT

## 2025-04-27 PROCEDURE — 93010 ELECTROCARDIOGRAM REPORT: CPT | Performed by: INTERNAL MEDICINE

## 2025-04-27 PROCEDURE — 87636 SARSCOV2 & INF A&B AMP PRB: CPT

## 2025-04-27 PROCEDURE — 36415 COLL VENOUS BLD VENIPUNCTURE: CPT

## 2025-04-27 PROCEDURE — 85025 COMPLETE CBC W/AUTO DIFF WBC: CPT

## 2025-04-27 PROCEDURE — 93005 ELECTROCARDIOGRAM TRACING: CPT | Performed by: EMERGENCY MEDICINE

## 2025-04-27 PROCEDURE — 2500000003 HC RX 250 WO HCPCS

## 2025-04-27 PROCEDURE — 71045 X-RAY EXAM CHEST 1 VIEW: CPT

## 2025-04-27 PROCEDURE — 80053 COMPREHEN METABOLIC PANEL: CPT

## 2025-04-27 PROCEDURE — 6370000000 HC RX 637 (ALT 250 FOR IP)

## 2025-04-27 PROCEDURE — 99285 EMERGENCY DEPT VISIT HI MDM: CPT

## 2025-04-27 PROCEDURE — 96374 THER/PROPH/DIAG INJ IV PUSH: CPT

## 2025-04-27 PROCEDURE — 6360000002 HC RX W HCPCS

## 2025-04-27 RX ORDER — IPRATROPIUM BROMIDE AND ALBUTEROL SULFATE 2.5; .5 MG/3ML; MG/3ML
2 SOLUTION RESPIRATORY (INHALATION) ONCE
Status: COMPLETED | OUTPATIENT
Start: 2025-04-27 | End: 2025-04-27

## 2025-04-27 RX ORDER — PREDNISONE 50 MG/1
50 TABLET ORAL DAILY
Qty: 5 TABLET | Refills: 0 | Status: SHIPPED | OUTPATIENT
Start: 2025-04-27 | End: 2025-05-02

## 2025-04-27 RX ORDER — DOXYCYCLINE HYCLATE 100 MG
100 TABLET ORAL 2 TIMES DAILY
Qty: 20 TABLET | Refills: 0 | Status: SHIPPED | OUTPATIENT
Start: 2025-04-27 | End: 2025-05-07

## 2025-04-27 RX ORDER — DOXYCYCLINE HYCLATE 100 MG
100 TABLET ORAL ONCE
Status: COMPLETED | OUTPATIENT
Start: 2025-04-27 | End: 2025-04-27

## 2025-04-27 RX ADMIN — AMOXICILLIN AND CLAVULANATE POTASSIUM 1 TABLET: 875; 125 TABLET, FILM COATED ORAL at 14:15

## 2025-04-27 RX ADMIN — IPRATROPIUM BROMIDE AND ALBUTEROL SULFATE 2 DOSE: 2.5; .5 SOLUTION RESPIRATORY (INHALATION) at 13:24

## 2025-04-27 RX ADMIN — DOXYCYCLINE HYCLATE 100 MG: 100 TABLET, COATED ORAL at 14:15

## 2025-04-27 RX ADMIN — WATER 125 MG: 1 INJECTION INTRAMUSCULAR; INTRAVENOUS; SUBCUTANEOUS at 13:23

## 2025-04-27 ASSESSMENT — PAIN - FUNCTIONAL ASSESSMENT: PAIN_FUNCTIONAL_ASSESSMENT: 0-10

## 2025-04-27 ASSESSMENT — PAIN DESCRIPTION - LOCATION: LOCATION: CHEST

## 2025-04-27 ASSESSMENT — PAIN SCALES - GENERAL: PAINLEVEL_OUTOF10: 2

## 2025-04-29 NOTE — ED PROVIDER NOTES
(LOPRESSOR) 25 MG tablet TAKE 1 TABLET BY MOUTH TWICE A DAY, Disp-180 tablet, R-3Normal      ranolazine (RANEXA) 1000 MG extended release tablet Take 1 tablet by mouth 2 times daily, Disp-60 tablet, R-5Normal      atorvastatin (LIPITOR) 40 MG tablet Take 1 tablet by mouth daily, Disp-90 tablet, R-3Normal      aspirin (ASPIRIN CHILDRENS) 81 MG chewable tablet Take 1 tablet by mouth daily, Disp-30 tablet, R-3OTC      omeprazole (PRILOSEC) 40 MG delayed release capsule Take 1 capsule by mouth 2 times dailyHistorical Med      albuterol sulfate HFA (PROVENTIL HFA) 108 (90 Base) MCG/ACT inhaler Inhale 2 puffs into the lungs every 4 hours as needed for Wheezing, Disp-1 Inhaler, R-1Normal      nitroGLYCERIN (NITROSTAT) 0.4 MG SL tablet Place 1 tablet under the tongue every 5 minutes as needed for Chest pain up to max of 3 total doses. If no relief after 1 dose, call 911., Disp-25 tablet, R-3Normal             ALLERGIES     Acetaminophen, Other, Percocet [oxycodone-acetaminophen], Propoxyphene, Isosorbide, Morphine, Morphine and codeine, and Nickel    FAMILYHISTORY       Family History   Problem Relation Age of Onset    Heart Disease Mother         defibrillator    Thyroid Disease Father         PPM    Arthritis Sister     Alcohol Abuse Brother         arthritis    Other Sister         Typhoid fever complications    Other Sister         obesity    Cancer Maternal Grandmother     Alzheimer's Disease Maternal Grandmother     Diabetes Paternal Grandmother         SOCIAL HISTORY       Social History     Tobacco Use    Smoking status: Every Day     Current packs/day: 1.00     Average packs/day: 1 pack/day for 30.0 years (30.0 ttl pk-yrs)     Types: Cigarettes    Smokeless tobacco: Never   Vaping Use    Vaping status: Never Used   Substance Use Topics    Alcohol use: No    Drug use: No       SCREENINGS        Taz Coma Scale  Eye Opening: Spontaneous  Best Verbal Response: Oriented  Best Motor Response: Obeys commands  Taz

## 2025-05-21 ENCOUNTER — HOSPITAL ENCOUNTER (OUTPATIENT)
Dept: CARDIAC REHAB | Age: 70
Setting detail: THERAPIES SERIES
Discharge: HOME | End: 2025-05-21
Payer: MEDICARE

## 2025-05-21 PROCEDURE — 93798 PHYS/QHP OP CAR RHAB W/ECG: CPT

## 2025-05-23 ENCOUNTER — HOSPITAL ENCOUNTER (OUTPATIENT)
Dept: CARDIAC REHAB | Age: 70
Setting detail: THERAPIES SERIES
Discharge: HOME | End: 2025-05-23
Payer: MEDICARE

## 2025-05-23 PROCEDURE — 93798 PHYS/QHP OP CAR RHAB W/ECG: CPT

## 2025-05-28 ENCOUNTER — HOSPITAL ENCOUNTER (OUTPATIENT)
Dept: CARDIAC REHAB | Age: 70
Setting detail: THERAPIES SERIES
Discharge: HOME | End: 2025-05-28
Payer: MEDICARE

## 2025-05-28 PROCEDURE — 93798 PHYS/QHP OP CAR RHAB W/ECG: CPT

## 2025-05-30 ENCOUNTER — HOSPITAL ENCOUNTER (OUTPATIENT)
Dept: CARDIAC REHAB | Age: 70
Setting detail: THERAPIES SERIES
Discharge: HOME | End: 2025-05-30
Payer: MEDICARE

## 2025-05-30 PROCEDURE — 93798 PHYS/QHP OP CAR RHAB W/ECG: CPT

## 2025-06-02 ENCOUNTER — HOSPITAL ENCOUNTER (OUTPATIENT)
Dept: CARDIAC REHAB | Age: 70
Setting detail: THERAPIES SERIES
Discharge: HOME | End: 2025-06-02
Payer: MEDICARE

## 2025-06-02 PROCEDURE — 93798 PHYS/QHP OP CAR RHAB W/ECG: CPT

## 2025-06-04 ENCOUNTER — HOSPITAL ENCOUNTER (OUTPATIENT)
Dept: CARDIAC REHAB | Age: 70
Setting detail: THERAPIES SERIES
Discharge: HOME | End: 2025-06-04
Payer: MEDICARE

## 2025-06-04 PROCEDURE — 93798 PHYS/QHP OP CAR RHAB W/ECG: CPT

## 2025-06-06 ENCOUNTER — HOSPITAL ENCOUNTER (OUTPATIENT)
Dept: CARDIAC REHAB | Age: 70
Setting detail: THERAPIES SERIES
Discharge: HOME | End: 2025-06-06
Payer: MEDICARE

## 2025-06-06 PROCEDURE — 93798 PHYS/QHP OP CAR RHAB W/ECG: CPT

## 2025-06-09 ENCOUNTER — HOSPITAL ENCOUNTER (OUTPATIENT)
Dept: CARDIAC REHAB | Age: 70
Setting detail: THERAPIES SERIES
Discharge: HOME | End: 2025-06-09
Payer: MEDICARE

## 2025-06-09 PROCEDURE — 93798 PHYS/QHP OP CAR RHAB W/ECG: CPT

## 2025-06-11 ENCOUNTER — HOSPITAL ENCOUNTER (OUTPATIENT)
Dept: CARDIAC REHAB | Age: 70
Setting detail: THERAPIES SERIES
Discharge: HOME | End: 2025-06-11
Payer: MEDICARE

## 2025-06-11 PROCEDURE — 93798 PHYS/QHP OP CAR RHAB W/ECG: CPT

## 2025-06-13 ENCOUNTER — HOSPITAL ENCOUNTER (OUTPATIENT)
Dept: CARDIAC REHAB | Age: 70
Setting detail: THERAPIES SERIES
Discharge: HOME | End: 2025-06-13
Payer: MEDICARE

## 2025-06-13 PROCEDURE — 93798 PHYS/QHP OP CAR RHAB W/ECG: CPT

## 2025-06-16 ENCOUNTER — HOSPITAL ENCOUNTER (OUTPATIENT)
Dept: CARDIAC REHAB | Age: 70
Setting detail: THERAPIES SERIES
Discharge: HOME | End: 2025-06-16
Payer: MEDICARE

## 2025-06-16 PROCEDURE — 93798 PHYS/QHP OP CAR RHAB W/ECG: CPT

## 2025-06-18 ENCOUNTER — HOSPITAL ENCOUNTER (OUTPATIENT)
Dept: CARDIAC REHAB | Age: 70
Setting detail: THERAPIES SERIES
Discharge: HOME | End: 2025-06-18
Payer: MEDICARE

## 2025-06-18 PROCEDURE — 93798 PHYS/QHP OP CAR RHAB W/ECG: CPT

## 2025-06-20 ENCOUNTER — HOSPITAL ENCOUNTER (OUTPATIENT)
Dept: CARDIAC REHAB | Age: 70
Setting detail: THERAPIES SERIES
Discharge: HOME | End: 2025-06-20
Payer: MEDICARE

## 2025-06-20 PROCEDURE — 93798 PHYS/QHP OP CAR RHAB W/ECG: CPT

## 2025-06-23 ENCOUNTER — HOSPITAL ENCOUNTER (OUTPATIENT)
Dept: CARDIAC REHAB | Age: 70
Setting detail: THERAPIES SERIES
End: 2025-06-23
Payer: MEDICARE

## 2025-06-25 ENCOUNTER — HOSPITAL ENCOUNTER (OUTPATIENT)
Dept: CARDIAC REHAB | Age: 70
Setting detail: THERAPIES SERIES
Discharge: HOME | End: 2025-06-25
Payer: MEDICARE

## 2025-06-25 PROCEDURE — 93798 PHYS/QHP OP CAR RHAB W/ECG: CPT

## 2025-06-27 ENCOUNTER — HOSPITAL ENCOUNTER (OUTPATIENT)
Dept: CARDIAC REHAB | Age: 70
Setting detail: THERAPIES SERIES
Discharge: HOME | End: 2025-06-27
Payer: MEDICARE

## 2025-06-27 PROCEDURE — 93798 PHYS/QHP OP CAR RHAB W/ECG: CPT

## 2025-06-30 ENCOUNTER — HOSPITAL ENCOUNTER (OUTPATIENT)
Dept: CARDIAC REHAB | Age: 70
Setting detail: THERAPIES SERIES
Discharge: HOME | End: 2025-06-30
Payer: MEDICARE

## 2025-06-30 PROCEDURE — 93798 PHYS/QHP OP CAR RHAB W/ECG: CPT

## 2025-07-02 ENCOUNTER — HOSPITAL ENCOUNTER (OUTPATIENT)
Dept: CARDIAC REHAB | Age: 70
Setting detail: THERAPIES SERIES
Discharge: HOME | End: 2025-07-02
Payer: MEDICARE

## 2025-07-02 PROCEDURE — 93798 PHYS/QHP OP CAR RHAB W/ECG: CPT

## 2025-07-07 ENCOUNTER — HOSPITAL ENCOUNTER (OUTPATIENT)
Dept: CARDIAC REHAB | Age: 70
Setting detail: THERAPIES SERIES
End: 2025-07-07
Payer: MEDICARE

## 2025-07-09 ENCOUNTER — HOSPITAL ENCOUNTER (OUTPATIENT)
Dept: CARDIAC REHAB | Age: 70
Setting detail: THERAPIES SERIES
Discharge: HOME | End: 2025-07-09
Payer: MEDICARE

## 2025-07-09 PROCEDURE — 93798 PHYS/QHP OP CAR RHAB W/ECG: CPT

## 2025-07-11 ENCOUNTER — HOSPITAL ENCOUNTER (OUTPATIENT)
Dept: CARDIAC REHAB | Age: 70
Setting detail: THERAPIES SERIES
Discharge: HOME | End: 2025-07-11
Payer: MEDICARE

## 2025-07-11 PROCEDURE — 93798 PHYS/QHP OP CAR RHAB W/ECG: CPT

## 2025-07-14 ENCOUNTER — HOSPITAL ENCOUNTER (OUTPATIENT)
Dept: CARDIAC REHAB | Age: 70
Setting detail: THERAPIES SERIES
Discharge: HOME | End: 2025-07-14
Payer: MEDICARE

## 2025-07-14 PROCEDURE — 93798 PHYS/QHP OP CAR RHAB W/ECG: CPT

## 2025-07-16 ENCOUNTER — HOSPITAL ENCOUNTER (OUTPATIENT)
Dept: CARDIAC REHAB | Age: 70
Setting detail: THERAPIES SERIES
Discharge: HOME | End: 2025-07-16
Payer: MEDICARE

## 2025-07-16 PROCEDURE — 93798 PHYS/QHP OP CAR RHAB W/ECG: CPT

## 2025-07-18 ENCOUNTER — HOSPITAL ENCOUNTER (OUTPATIENT)
Dept: CARDIAC REHAB | Age: 70
Setting detail: THERAPIES SERIES
Discharge: HOME | End: 2025-07-18
Payer: MEDICARE

## 2025-07-18 PROCEDURE — 93798 PHYS/QHP OP CAR RHAB W/ECG: CPT

## 2025-07-21 ENCOUNTER — HOSPITAL ENCOUNTER (OUTPATIENT)
Dept: CARDIAC REHAB | Age: 70
Setting detail: THERAPIES SERIES
Discharge: HOME | End: 2025-07-21
Payer: MEDICARE

## 2025-07-21 ENCOUNTER — OFFICE VISIT (OUTPATIENT)
Dept: ORTHOPEDIC SURGERY | Age: 70
End: 2025-07-21
Payer: MEDICARE

## 2025-07-21 VITALS — BODY MASS INDEX: 30.13 KG/M2 | HEIGHT: 67 IN | WEIGHT: 192 LBS

## 2025-07-21 DIAGNOSIS — Z96.652 HISTORY OF TOTAL KNEE ARTHROPLASTY, LEFT: Primary | ICD-10-CM

## 2025-07-21 DIAGNOSIS — Z96.652 HISTORY OF TOTAL KNEE ARTHROPLASTY, LEFT: ICD-10-CM

## 2025-07-21 PROCEDURE — 4004F PT TOBACCO SCREEN RCVD TLK: CPT | Performed by: PHYSICIAN ASSISTANT

## 2025-07-21 PROCEDURE — L1812 KO ELASTIC W/JOINTS PRE OTS: HCPCS | Performed by: PHYSICIAN ASSISTANT

## 2025-07-21 PROCEDURE — G8428 CUR MEDS NOT DOCUMENT: HCPCS | Performed by: PHYSICIAN ASSISTANT

## 2025-07-21 PROCEDURE — G8417 CALC BMI ABV UP PARAM F/U: HCPCS | Performed by: PHYSICIAN ASSISTANT

## 2025-07-21 PROCEDURE — 1123F ACP DISCUSS/DSCN MKR DOCD: CPT | Performed by: PHYSICIAN ASSISTANT

## 2025-07-21 PROCEDURE — 3017F COLORECTAL CA SCREEN DOC REV: CPT | Performed by: PHYSICIAN ASSISTANT

## 2025-07-21 PROCEDURE — 93798 PHYS/QHP OP CAR RHAB W/ECG: CPT

## 2025-07-21 PROCEDURE — 99204 OFFICE O/P NEW MOD 45 MIN: CPT | Performed by: PHYSICIAN ASSISTANT

## 2025-07-21 NOTE — PROGRESS NOTES
Dr Weston Rico      Date /Time 7/21/2025       9:14 AM EDT  Name Nba Garcia             1955   Location  [facility]  MRN [id number]                Chief Complaint   Patient presents with    Knee Pain     Op Np Left Knee (Revision Left TKA 03/05/2019 Radha)         History of Present Illness  Nba Garcia is a 70 y.o. male who presents with  left knee pain, .    Sent in consultation by Sheng Sanabria MD, .      Injury Mechanism:  none.  Worker's Comp. & legal issues:   none.  Previous Treatments: Ice, Heat, and NSAIDs    Patient presents to the office today for a new problem.  Patient here with a chief complaint of left knee pain.  Patient has had left knee pain for approximately 3-4 months.  His symptoms then increased when he started cardiac rehab about 6 months ago.  He did have his primary total knee arthroplasty done in 2016 and then a revision in 2019 done by Dr. Garcia for metal allergy.  He has developed recurrent pain again without injury or trauma.  It is lateral greater than medial.  Increased pain with activities and improvement with rest.    Past History  Past Medical History:   Diagnosis Date    Anxiety     severe pre-op Anxiety    Arthritis     knee    Bladder distention     Patient sees Dr. Albarado    Coronary artery disease of native artery of native heart with stable angina pectoris     HTN (hypertension) 2/18/2019    Hyperlipidemia     Irregular heart beat     hx of     Knee joint dislocation     left    Urinary incontinence     wears depends    Urinary retention     self cath 3x day/ resolved 11/2018    Wears dentures     full     Past Surgical History:   Procedure Laterality Date    CHOLECYSTECTOMY      CYSTOSCOPY      KNEE ARTHROSCOPY Left 03/03/2017     LEFT KNEE ARTHROSCOPY, SYNOVECTOMY      PROSTATE SURGERY      REVISION TOTAL KNEE ARTHROPLASTY Left 2/18/2019    REVISION LEFT TOTAL KNEE ARTHROPLASTY               LOREDO performed by Desean Garcia MD at [facility] OR

## 2025-07-22 LAB
BASOPHILS # BLD: 0.1 K/UL (ref 0–0.2)
BASOPHILS NFR BLD: 0.9 %
CREAT SERPL-MCNC: 0.7 MG/DL (ref 0.8–1.3)
CRP SERPL-MCNC: <3 MG/L (ref 0–5.1)
DEPRECATED RDW RBC AUTO: 12.7 % (ref 12.4–15.4)
EOSINOPHIL # BLD: 0.2 K/UL (ref 0–0.6)
EOSINOPHIL NFR BLD: 2.6 %
ERYTHROCYTE [SEDIMENTATION RATE] IN BLOOD BY WESTERGREN METHOD: <1 MM/HR (ref 0–20)
GFR SERPLBLD CREATININE-BSD FMLA CKD-EPI: >90 ML/MIN/{1.73_M2}
HCT VFR BLD AUTO: 46.3 % (ref 40.5–52.5)
HGB BLD-MCNC: 16.4 G/DL (ref 13.5–17.5)
LYMPHOCYTES # BLD: 2 K/UL (ref 1–5.1)
LYMPHOCYTES NFR BLD: 29.3 %
MCH RBC QN AUTO: 32.5 PG (ref 26–34)
MCHC RBC AUTO-ENTMCNC: 35.4 G/DL (ref 31–36)
MCV RBC AUTO: 91.7 FL (ref 80–100)
MONOCYTES # BLD: 0.4 K/UL (ref 0–1.3)
MONOCYTES NFR BLD: 6.6 %
NEUTROPHILS # BLD: 4.1 K/UL (ref 1.7–7.7)
NEUTROPHILS NFR BLD: 60.6 %
PLATELET # BLD AUTO: 208 K/UL (ref 135–450)
PMV BLD AUTO: 9.4 FL (ref 5–10.5)
RBC # BLD AUTO: 5.04 M/UL (ref 4.2–5.9)
WBC # BLD AUTO: 6.7 K/UL (ref 4–11)

## 2025-07-23 ENCOUNTER — HOSPITAL ENCOUNTER (OUTPATIENT)
Dept: CARDIAC REHAB | Age: 70
Setting detail: THERAPIES SERIES
Discharge: HOME | End: 2025-07-23
Payer: MEDICARE

## 2025-07-23 PROCEDURE — 93798 PHYS/QHP OP CAR RHAB W/ECG: CPT

## 2025-07-25 ENCOUNTER — HOSPITAL ENCOUNTER (OUTPATIENT)
Dept: CARDIAC REHAB | Age: 70
Setting detail: THERAPIES SERIES
Discharge: HOME | End: 2025-07-25
Payer: MEDICARE

## 2025-07-25 PROCEDURE — 93798 PHYS/QHP OP CAR RHAB W/ECG: CPT

## 2025-07-28 ENCOUNTER — HOSPITAL ENCOUNTER (OUTPATIENT)
Dept: CARDIAC REHAB | Age: 70
Setting detail: THERAPIES SERIES
Discharge: HOME | End: 2025-07-28
Payer: MEDICARE

## 2025-07-28 ENCOUNTER — HOSPITAL ENCOUNTER (OUTPATIENT)
Dept: NUCLEAR MEDICINE | Age: 70
Discharge: HOME | End: 2025-07-28
Payer: MEDICARE

## 2025-07-28 DIAGNOSIS — Z96.652 HISTORY OF TOTAL KNEE ARTHROPLASTY, LEFT: ICD-10-CM

## 2025-07-28 PROCEDURE — 3430000000 HC RX DIAGNOSTIC RADIOPHARMACEUTICAL: Performed by: PHYSICIAN ASSISTANT

## 2025-07-28 PROCEDURE — 78315 BONE IMAGING 3 PHASE: CPT

## 2025-07-28 PROCEDURE — A9503 TC99M MEDRONATE: HCPCS | Performed by: PHYSICIAN ASSISTANT

## 2025-07-28 PROCEDURE — 93798 PHYS/QHP OP CAR RHAB W/ECG: CPT

## 2025-07-28 RX ORDER — TC 99M MEDRONATE 20 MG/10ML
25.5 INJECTION, POWDER, LYOPHILIZED, FOR SOLUTION INTRAVENOUS
Status: COMPLETED | OUTPATIENT
Start: 2025-07-28 | End: 2025-07-28

## 2025-07-28 RX ADMIN — TC 99M MEDRONATE 25.5 MILLICURIE: 20 INJECTION, POWDER, LYOPHILIZED, FOR SOLUTION INTRAVENOUS at 08:38

## 2025-07-30 ENCOUNTER — HOSPITAL ENCOUNTER (OUTPATIENT)
Dept: CARDIAC REHAB | Age: 70
Setting detail: THERAPIES SERIES
End: 2025-07-30
Payer: MEDICARE

## 2025-08-01 ENCOUNTER — HOSPITAL ENCOUNTER (OUTPATIENT)
Dept: CARDIAC REHAB | Age: 70
Setting detail: THERAPIES SERIES
Discharge: HOME | End: 2025-08-01
Payer: MEDICARE

## 2025-08-01 PROCEDURE — 93798 PHYS/QHP OP CAR RHAB W/ECG: CPT

## 2025-08-04 ENCOUNTER — HOSPITAL ENCOUNTER (OUTPATIENT)
Dept: CARDIAC REHAB | Age: 70
Setting detail: THERAPIES SERIES
Discharge: HOME | End: 2025-08-04
Payer: MEDICARE

## 2025-08-04 PROCEDURE — 93798 PHYS/QHP OP CAR RHAB W/ECG: CPT

## 2025-08-05 ENCOUNTER — OFFICE VISIT (OUTPATIENT)
Dept: ORTHOPEDIC SURGERY | Age: 70
End: 2025-08-05

## 2025-08-05 VITALS — WEIGHT: 192 LBS | HEIGHT: 67 IN | BODY MASS INDEX: 30.13 KG/M2

## 2025-08-05 DIAGNOSIS — Z96.652 HISTORY OF TOTAL KNEE ARTHROPLASTY, LEFT: Primary | ICD-10-CM

## 2025-08-06 ENCOUNTER — HOSPITAL ENCOUNTER (OUTPATIENT)
Dept: CARDIAC REHAB | Age: 70
Setting detail: THERAPIES SERIES
Discharge: HOME | End: 2025-08-06
Payer: MEDICARE

## 2025-08-06 PROCEDURE — 93798 PHYS/QHP OP CAR RHAB W/ECG: CPT

## 2025-08-08 ENCOUNTER — HOSPITAL ENCOUNTER (OUTPATIENT)
Dept: CARDIAC REHAB | Age: 70
Setting detail: THERAPIES SERIES
Discharge: HOME | End: 2025-08-08
Payer: MEDICARE

## 2025-08-08 PROCEDURE — 93798 PHYS/QHP OP CAR RHAB W/ECG: CPT

## 2025-08-11 ENCOUNTER — HOSPITAL ENCOUNTER (OUTPATIENT)
Dept: CARDIAC REHAB | Age: 70
Setting detail: THERAPIES SERIES
Discharge: HOME | End: 2025-08-11
Payer: MEDICARE

## 2025-08-11 PROCEDURE — 93798 PHYS/QHP OP CAR RHAB W/ECG: CPT

## 2025-08-13 ENCOUNTER — HOSPITAL ENCOUNTER (OUTPATIENT)
Dept: CARDIAC REHAB | Age: 70
Setting detail: THERAPIES SERIES
Discharge: HOME | End: 2025-08-13
Payer: MEDICARE

## 2025-08-13 PROCEDURE — 93798 PHYS/QHP OP CAR RHAB W/ECG: CPT

## 2025-08-15 ENCOUNTER — HOSPITAL ENCOUNTER (OUTPATIENT)
Dept: CARDIAC REHAB | Age: 70
Setting detail: THERAPIES SERIES
Discharge: HOME | End: 2025-08-15
Payer: MEDICARE

## 2025-08-15 PROCEDURE — 93798 PHYS/QHP OP CAR RHAB W/ECG: CPT

## 2025-08-18 ENCOUNTER — APPOINTMENT (OUTPATIENT)
Dept: CARDIAC REHAB | Age: 70
End: 2025-08-18
Payer: MEDICARE

## 2025-08-20 ENCOUNTER — HOSPITAL ENCOUNTER (OUTPATIENT)
Dept: CARDIAC REHAB | Age: 70
Setting detail: THERAPIES SERIES
Discharge: HOME | End: 2025-08-20
Payer: MEDICARE

## 2025-08-20 PROCEDURE — 93798 PHYS/QHP OP CAR RHAB W/ECG: CPT

## 2025-08-22 ENCOUNTER — APPOINTMENT (OUTPATIENT)
Dept: CARDIAC REHAB | Age: 70
End: 2025-08-22
Payer: MEDICARE

## 2025-08-25 ENCOUNTER — HOSPITAL ENCOUNTER (OUTPATIENT)
Dept: CARDIAC REHAB | Age: 70
Setting detail: THERAPIES SERIES
Discharge: HOME | End: 2025-08-25
Payer: MEDICARE

## 2025-08-25 PROCEDURE — 93798 PHYS/QHP OP CAR RHAB W/ECG: CPT

## 2025-08-27 ENCOUNTER — HOSPITAL ENCOUNTER (OUTPATIENT)
Dept: CARDIAC REHAB | Age: 70
Setting detail: THERAPIES SERIES
Discharge: HOME | End: 2025-08-27
Payer: MEDICARE

## 2025-08-27 PROCEDURE — 93798 PHYS/QHP OP CAR RHAB W/ECG: CPT

## 2025-08-29 ENCOUNTER — APPOINTMENT (OUTPATIENT)
Dept: CARDIAC REHAB | Age: 70
End: 2025-08-29
Payer: MEDICARE

## (undated) DEVICE — SOLUTION IV IRRIG WATER 1000ML POUR BRL 2F7114

## (undated) DEVICE — GLOVE ORANGE PI 8 1/2   MSG9085

## (undated) DEVICE — SMARTGOWN SURGICAL GOWN, XL: Brand: CONVERTORS

## (undated) DEVICE — SUTURE MCRYL SZ 2-0 L18IN ABSRB VLT L36MM CT-1 1/2 CIR Y739D

## (undated) DEVICE — SOLUTION IRRIG 2000ML 0.9% SOD CHL USP UROMATIC PLAS CONT

## (undated) DEVICE — HOOD, PEEL-AWAY: Brand: FLYTE

## (undated) DEVICE — HANDPIECE SET WITH HIGH FLOW TIP AND SUCTION TUBE: Brand: INTERPULSE

## (undated) DEVICE — GENESIS TROCHLEAR PIN 1/8 X 3: Brand: GENESIS

## (undated) DEVICE — GLOVE ORANGE PI 7   MSG9070

## (undated) DEVICE — STANDARD HYPODERMIC NEEDLE,POLYPROPYLENE HUB: Brand: MONOJECT

## (undated) DEVICE — DRESSING FOAM W4XL12IN SIL RECT ADH WTRPRF FLM BK W/ BORD

## (undated) DEVICE — BLADE SURG SAW SAG S STL AGG 905MM LEN 185MM W 127MM THCK

## (undated) DEVICE — SYRINGE MED 10ML LUERLOCK TIP W/O SFTY DISP

## (undated) DEVICE — STERILE LATEX POWDER-FREE SURGICAL GLOVESWITH NITRILE COATING: Brand: PROTEXIS

## (undated) DEVICE — SMARTGOWN BREATHABLE SURGICAL GOWN: Brand: CONVERTORS

## (undated) DEVICE — Device

## (undated) DEVICE — PENCIL SMK EVAC ALL IN 1 DSGN ENH VISIBILITY IMPROVED AIR

## (undated) DEVICE — COVER,TABLE,HEAVY DUTY,50"X90",STRL: Brand: MEDLINE

## (undated) DEVICE — KNEE IMMOBILIZER: Brand: DEROYAL

## (undated) DEVICE — NEEDLE SPNL 20GA L3.5IN YEL HUB S STL REG WALL FIT STYL W/

## (undated) DEVICE — ADHESIVE SKIN CLSR 0.7ML TOP DERMBND ADV

## (undated) DEVICE — SYRINGE, LUER LOCK, 30ML: Brand: MEDLINE

## (undated) DEVICE — GLOVE ORANGE PI 8   MSG9080

## (undated) DEVICE — DRILL BIT 3.2MM (1/8'') X 128.0MM

## (undated) DEVICE — STERILE PVP: Brand: MEDLINE INDUSTRIES, INC.

## (undated) DEVICE — SUTURE MCRYL SZ 0 L18IN ABSRB VLT L36MM CT-1 1/2 CIR Y740D

## (undated) DEVICE — GOWN,REINF,POLY,AURORA,XLNG/XXL,STRL: Brand: MEDLINE

## (undated) DEVICE — COVER XR CASS W20XL41IN UNIV ADH STRP

## (undated) DEVICE — GLOVE SURG SZ 9 L11.85IN FNGR THK9.8MIL STRW LTX POLYMER

## (undated) DEVICE — SUTURE ETHBND EXCEL SZ 0 L18IN NONABSORBABLE GRN L36MM CT-1 CX21D

## (undated) DEVICE — ZIMMER® STERILE DISPOSABLE TOURNIQUET CUFF WITH PLC, DUAL PORT, SINGLE BLADDER, 30 IN. (76 CM)

## (undated) DEVICE — 35 ML SYRINGE LUER-LOCK TIP: Brand: MONOJECT

## (undated) DEVICE — GLOVE SURG SZ 65 THK91MIL LTX FREE SYN POLYISOPRENE